# Patient Record
Sex: MALE | Race: WHITE | Employment: OTHER | URBAN - METROPOLITAN AREA
[De-identification: names, ages, dates, MRNs, and addresses within clinical notes are randomized per-mention and may not be internally consistent; named-entity substitution may affect disease eponyms.]

---

## 2019-03-14 ENCOUNTER — OFFICE VISIT (OUTPATIENT)
Dept: FAMILY MEDICINE | Facility: CLINIC | Age: 69
End: 2019-03-14
Payer: MEDICARE

## 2019-03-14 ENCOUNTER — ANCILLARY PROCEDURE (OUTPATIENT)
Dept: GENERAL RADIOLOGY | Facility: CLINIC | Age: 69
End: 2019-03-14
Attending: FAMILY MEDICINE
Payer: MEDICARE

## 2019-03-14 VITALS
OXYGEN SATURATION: 98 % | TEMPERATURE: 99.4 F | HEART RATE: 57 BPM | SYSTOLIC BLOOD PRESSURE: 160 MMHG | DIASTOLIC BLOOD PRESSURE: 78 MMHG | HEIGHT: 64 IN | WEIGHT: 166 LBS | BODY MASS INDEX: 28.34 KG/M2

## 2019-03-14 DIAGNOSIS — R05.9 COUGH: ICD-10-CM

## 2019-03-14 DIAGNOSIS — R22.1 MASS OF LEFT SIDE OF NECK: Primary | ICD-10-CM

## 2019-03-14 DIAGNOSIS — Z87.891 FORMER SMOKER: ICD-10-CM

## 2019-03-14 DIAGNOSIS — I10 ESSENTIAL HYPERTENSION: ICD-10-CM

## 2019-03-14 LAB
BASOPHILS # BLD AUTO: 0 10E9/L (ref 0–0.2)
BASOPHILS NFR BLD AUTO: 0.3 %
DIFFERENTIAL METHOD BLD: ABNORMAL
EOSINOPHIL # BLD AUTO: 0.1 10E9/L (ref 0–0.7)
EOSINOPHIL NFR BLD AUTO: 1.1 %
ERYTHROCYTE [DISTWIDTH] IN BLOOD BY AUTOMATED COUNT: 13.6 % (ref 10–15)
HCT VFR BLD AUTO: 49.9 % (ref 40–53)
HGB BLD-MCNC: 16.7 G/DL (ref 13.3–17.7)
LYMPHOCYTES # BLD AUTO: 3.2 10E9/L (ref 0.8–5.3)
LYMPHOCYTES NFR BLD AUTO: 42.3 %
MCH RBC QN AUTO: 31.6 PG (ref 26.5–33)
MCHC RBC AUTO-ENTMCNC: 33.5 G/DL (ref 31.5–36.5)
MCV RBC AUTO: 94 FL (ref 78–100)
MONOCYTES # BLD AUTO: 0.6 10E9/L (ref 0–1.3)
MONOCYTES NFR BLD AUTO: 7.8 %
NEUTROPHILS # BLD AUTO: 3.7 10E9/L (ref 1.6–8.3)
NEUTROPHILS NFR BLD AUTO: 48.5 %
PLATELET # BLD AUTO: 104 10E9/L (ref 150–450)
RBC # BLD AUTO: 5.29 10E12/L (ref 4.4–5.9)
WBC # BLD AUTO: 7.5 10E9/L (ref 4–11)

## 2019-03-14 PROCEDURE — 85025 COMPLETE CBC W/AUTO DIFF WBC: CPT | Performed by: FAMILY MEDICINE

## 2019-03-14 PROCEDURE — 36415 COLL VENOUS BLD VENIPUNCTURE: CPT | Performed by: FAMILY MEDICINE

## 2019-03-14 PROCEDURE — 99203 OFFICE O/P NEW LOW 30 MIN: CPT | Performed by: FAMILY MEDICINE

## 2019-03-14 PROCEDURE — 71046 X-RAY EXAM CHEST 2 VIEWS: CPT

## 2019-03-14 SDOH — HEALTH STABILITY: MENTAL HEALTH: HOW OFTEN DO YOU HAVE A DRINK CONTAINING ALCOHOL?: MONTHLY OR LESS

## 2019-03-14 ASSESSMENT — MIFFLIN-ST. JEOR: SCORE: 1420.48

## 2019-03-14 NOTE — PROGRESS NOTES
SUBJECTIVE:                                                    Price Rene is a 68 year old male who presents to clinic today for the following health issues:  Patient comes with daughter, information were obtained through an .    Patient does have history of hypertension does take blood pressure medication however he reports he did not take them today.    Patient reports that he started noticing a lump at the left side of his neck about 15 days ago.  He reports is not painful and does not feel warm.  He has no headache no sore throat denies any other lump.  Has no fever no chills no weight loss or changes.  Patient reports he used to be a smoker denies any cough.    Patient reports that about a week ago he was in [place] he had that checked he had a report Ultrasound   He was told he needs a hard lump and he need to have a biopsy.   He provided a hard copy of his result and imaging.    He denies any history or family history of lymphoma he does not have any other lumps anywhere else.  He also reports no blood in his urine or stool denies being lightheaded or dizzy.    Concern - Lump in right side of neck  Onset: 15 days    Description:   Mass like cyst     Intensity: moderate    Progression of Symptoms:  same    Accompanying Signs & Symptoms:  Hurt with neck movement    Previous history of similar problem:   Non    Precipitating factors:   Worsened by: movement    Alleviating factors:  Improved by: None    Therapies Tried and outcome: None      Problem list and histories reviewed & adjusted, as indicated.  Additional history: as documented    Patient Active Problem List   Diagnosis     HTN (hypertension)     History reviewed. No pertinent surgical history.    Social History     Tobacco Use     Smoking status: Former Smoker     Smokeless tobacco: Never Used   Substance Use Topics     Alcohol use: Yes     Alcohol/week: 2.4 oz     Types: 4 Cans of beer per week     Frequency: Monthly or less      "Family History   Problem Relation Age of Onset     Hypertension Mother          No current outpatient medications on file.     Allergies   Allergen Reactions     Penicillins Hives     No lab results found.     ROS:  Constitutional, HEENT, cardiovascular, pulmonary, gi and gu systems are negative, except as otherwise noted.    OBJECTIVE:     /78   Pulse 57   Temp 99.4  F (37.4  C) (Oral)   Ht 1.62 m (5' 3.78\")   Wt 75.3 kg (166 lb)   SpO2 98%   BMI 28.69 kg/m    Body mass index is 28.69 kg/m .  GENERAL: healthy, alert and no distress  HENT: ear canals and TM's normal, nose and mouth without ulcers or lesions  NECK: mass left neck side,  Hard, fixed, non tender and not warm to touch. 3 cm by 2.5 cm   and thyroid normal to palpation  RESP: lungs clear to auscultation - no rales, rhonchi or wheezes  CV: regular rate and rhythm, normal S1 S2, no S3 or S4, no murmur, click or rub, no peripheral edema and peripheral pulses strong  ABDOMEN: soft, nontender, no hepatosplenomegaly, no masses and bowel sounds normal  MS: no gross musculoskeletal defects noted, no edema  NEURO: Normal strength and tone, mentation intact and speech normal    Diagnostic Test Results:  CBC: normal  CXR: normal    ASSESSMENT/PLAN:     Hypertension; slightly worsened   Associated with the following complications:    None   Plan:  No changes in the patient's current treatment plan      ICD-10-CM    1. Mass of left side of neck R22.1 US Biopsy FNA Lymph Node     CBC with platelets and differential   2. Former smoker Z87.891    3. Cough R05 CBC with platelets and differential     XR Chest 2 Views   4. Essential hypertension I10 Basic metabolic panel   1 1 left neck mass, heart rate 6.  Concerning of malignancy.  Patient provided a copy of his sonogram report which was done in [place].  The report was  written in [place].  However he also provided imaging.    Patient will be set up to have a biopsy, fine-needle biopsy.  I did do a CBC which " was normal.    2.  History of hypertension patient reports he is taking medicine from [place] he is not taking his medication today.  I advised him strongly to take his medication daily.  And to bring his prescription on his next office visit.    3.  Former smoker with history of cough and chest x-ray also was normal today.    Patient will follow-up after his fine-needle biopsy.    Patient Instructions   Follow up after Biopsy done      Michelle Menendez MD  [facility]

## 2019-03-14 NOTE — LETTER
[facility]   [address] [place]  [address]  [phone]                                   March 15, 2019    Price Rene  [address]  [facility] [address]        Dear Price,    Have him follow up after he gets his Biopsy ( a few days after ), will be called for an appt.  thanks    Results for orders placed or performed in visit on 03/14/19   CBC with platelets and differential   Result Value Ref Range    WBC 7.5 4.0 - 11.0 10e9/L    RBC Count 5.29 4.4 - 5.9 10e12/L    Hemoglobin 16.7 13.3 - 17.7 g/dL    Hematocrit 49.9 40.0 - 53.0 %    MCV 94 78 - 100 fl    MCH 31.6 26.5 - 33.0 pg    MCHC 33.5 31.5 - 36.5 g/dL    RDW 13.6 10.0 - 15.0 %    Platelet Count 104 (L) 150 - 450 10e9/L    % Neutrophils 48.5 %    % Lymphocytes 42.3 %    % Monocytes 7.8 %    % Eosinophils 1.1 %    % Basophils 0.3 %    Absolute Neutrophil 3.7 1.6 - 8.3 10e9/L    Absolute Lymphocytes 3.2 0.8 - 5.3 10e9/L    Absolute Monocytes 0.6 0.0 - 1.3 10e9/L    Absolute Eosinophils 0.1 0.0 - 0.7 10e9/L    Absolute Basophils 0.0 0.0 - 0.2 10e9/L    Diff Method Automated Method        If you have any questions please call the clinic at [phone]    Sincerely,    Michelle Menendez MD  [facility]

## 2019-03-15 ENCOUNTER — TELEPHONE (OUTPATIENT)
Dept: FAMILY MEDICINE | Facility: CLINIC | Age: 69
End: 2019-03-15

## 2019-03-15 NOTE — TELEPHONE ENCOUNTER
Patient's daughter is calling back regarding scheduling a biopsy. Please call daughter back to discuss.Best call back number is [phone] and okay to leave voicemail.

## 2019-03-15 NOTE — TELEPHONE ENCOUNTER
Message left for patient's daughter to return call to TC line regarding assisting with scheduling the following:    US Biopsy FNA Lymph Node    There are previous image pictures on Team 2 TC Desk. These images need to be sent to whatever imaging facility biopsy is scheduled at. Ask imaging scheduling if images need to be faxed or if they want patient to hand carry to appointment.

## 2019-03-18 NOTE — TELEPHONE ENCOUNTER
[facility] and [facility] stated they needed levels before they can schedule procedure. Please place these in the orders and inform TC.

## 2019-03-19 NOTE — TELEPHONE ENCOUNTER
Please inform pt. Of family of normal CXR result  Have him follow up after he gets his Biopsy ( a few days after ), will be called for an appt.  thanks

## 2019-03-20 ENCOUNTER — ANCILLARY PROCEDURE (OUTPATIENT)
Dept: ULTRASOUND IMAGING | Facility: CLINIC | Age: 69
End: 2019-03-20
Attending: FAMILY MEDICINE
Payer: MEDICARE

## 2019-03-20 DIAGNOSIS — R22.1 MASS OF LEFT SIDE OF NECK: Primary | ICD-10-CM

## 2019-03-20 PROCEDURE — 88305 TISSUE EXAM BY PATHOLOGIST: CPT | Performed by: FAMILY MEDICINE

## 2019-03-20 PROCEDURE — 88173 CYTOPATH EVAL FNA REPORT: CPT | Performed by: FAMILY MEDICINE

## 2019-03-20 PROCEDURE — 88172 CYTP DX EVAL FNA 1ST EA SITE: CPT | Performed by: FAMILY MEDICINE

## 2019-03-20 PROCEDURE — 00000155 ZZHCL STATISTIC H-CELL BLOCK W/STAIN: Performed by: FAMILY MEDICINE

## 2019-03-20 RX ORDER — LIDOCAINE HYDROCHLORIDE 10 MG/ML
5 INJECTION, SOLUTION INFILTRATION; PERINEURAL ONCE
Status: COMPLETED | OUTPATIENT
Start: 2019-03-20 | End: 2019-03-20

## 2019-03-20 RX ADMIN — LIDOCAINE HYDROCHLORIDE 3 ML: 10 INJECTION, SOLUTION INFILTRATION; PERINEURAL at 12:13

## 2019-03-22 LAB — COPATH REPORT: NORMAL

## 2019-03-25 ENCOUNTER — OFFICE VISIT (OUTPATIENT)
Dept: FAMILY MEDICINE | Facility: CLINIC | Age: 69
End: 2019-03-25
Payer: MEDICARE

## 2019-03-25 VITALS
HEART RATE: 65 BPM | WEIGHT: 170 LBS | OXYGEN SATURATION: 98 % | BODY MASS INDEX: 29.38 KG/M2 | SYSTOLIC BLOOD PRESSURE: 187 MMHG | DIASTOLIC BLOOD PRESSURE: 78 MMHG | TEMPERATURE: 99.5 F

## 2019-03-25 DIAGNOSIS — R22.1 MASS OF LEFT SIDE OF NECK: ICD-10-CM

## 2019-03-25 DIAGNOSIS — C73 MALIGNANT NEOPLASM OF THYROID GLAND (H): Primary | ICD-10-CM

## 2019-03-25 PROCEDURE — 99213 OFFICE O/P EST LOW 20 MIN: CPT | Performed by: FAMILY MEDICINE

## 2019-03-25 ASSESSMENT — PAIN SCALES - GENERAL: PAINLEVEL: NO PAIN (0)

## 2019-03-25 NOTE — PROGRESS NOTES
SUBJECTIVE:                                                    Price Rene is a 68 year old male who presents to clinic today for the following health issues:  Patient is here for results from last visit.  Patient comes in today to discuss his results.  Patient initially, came to the clinic on March 14, 2019 was He was complaining about a hard lump present in his left neck.  He reports it was present for about 2 weeks or so.  Patient had an  sonogram was done in [place].  He was scheduled was arranged to have a fine-needle biopsy.  He comes in today to discuss his results.  His results showed positive for malignancy, papillary thyroid carcinoma.    Patient reports the lump remained the same size is not painful, he has no difficulty swallowing or eating denies any coughing or choking.  Has no weight gain or loss.  He has no fever no chills.  He denies any family history of cancer or lung cancer.  He a former smoker however he does report drinking alcohol once in a while.    Patient does have history of hypertension he reports that he did not take his medication  today he takes medication he brought with him from [place].  He denies headache denies chest pain or shortness of breath    Patient Active Problem List   Diagnosis     HTN (hypertension)     Mass of left side of neck     Malignant neoplasm of thyroid gland (H)     History reviewed. No pertinent surgical history.    Social History     Tobacco Use     Smoking status: Former Smoker     Smokeless tobacco: Never Used   Substance Use Topics     Alcohol use: Yes     Alcohol/week: 2.4 oz     Types: 4 Cans of beer per week     Frequency: Monthly or less     Family History   Problem Relation Age of Onset     Hypertension Mother          No current outpatient medications on file.       ROS:  Constitutional, HEENT, cardiovascular, pulmonary, gi and gu systems are negative, except as otherwise noted.    OBJECTIVE:     /78 (BP Location: Right arm, Patient  Position: Chair, Cuff Size: Adult Regular)   Pulse 65   Temp 99.5  F (37.5  C) (Oral)   Wt 77.1 kg (170 lb)   SpO2 98%   BMI 29.38 kg/m     Body mass index is 29.38 kg/m .  GENERAL: healthy, alert and no distress  MS: no gross musculoskeletal defects noted, no edema    Diagnostic Test Results:  none     ASSESSMENT/PLAN:       ICD-10-CM    1. Malignant neoplasm of thyroid gland (H) C73 GENERAL SURG ADULT REFERRAL     ONC/HEME ADULT REFERRAL   2. Mass of left side of neck R22.1 GENERAL SURG ADULT REFERRAL     ONC/HEME ADULT REFERRAL   I reviewed the fine needle biopsy results with him and his daughter.  I did discuss with him the results showing that he has positive for malignancy positive for papillary thyroid carcinoma.    Patient was referred to general surgeon for surgical consultation and possible excision.  In addition, he was referred to oncology for discussion of possible treatment option.    In addition, I did discuss with the patient his daughter he may need to be referred to Endocrinology after he had his thyroid surgery done.    History of hypertension he was advised strongly to take his blood pressure medication daily, avoid skipping any doses.  In addition I did discuss with him again to bring his medication in his next visit.      There are no Patient Instructions on file for this visit.    Michelle Menendez MD  [facility]

## 2019-03-26 ENCOUNTER — TELEPHONE (OUTPATIENT)
Dept: ONCOLOGY | Facility: CLINIC | Age: 69
End: 2019-03-26

## 2019-03-26 NOTE — TELEPHONE ENCOUNTER
ONCOLOGY INTAKE: Records Information      APPT INFORMATION: 04/04  3:45 w/Kevin  Referring provider:  Michelle Menendez MD  Referring provider s clinic:  [facility]  Reason for visit/diagnosis:  Malignant neoplasm of thyroid gland (H)  Mass of left side of neck    Were the records received with the referral (via [employer])? Complete Per In-Law    Has patient been seen for any external appt for this diagnosis (enter clinic/location)? No    ADDITIONAL INFORMATION:  Dx:Malignant neoplasm of thyroid gland (H)  Mass of left side of neck: Caller intake: Ref by:Michelle Menendez MD-[facility]: Records In Epic

## 2019-03-28 ENCOUNTER — TELEPHONE (OUTPATIENT)
Dept: FAMILY MEDICINE | Facility: CLINIC | Age: 69
End: 2019-03-28

## 2019-03-28 NOTE — TELEPHONE ENCOUNTER
Fran returned call, says his dad is scheduled to see oncology in  on 4/4 and he also scheduled with [facility] for 4/11 and might cancel that if he is confident about the provider they see on 4/4, otherwise will plan to get second opinion.    Fran says he has some questions about the diagnosis and has some forms he needs to fill out and has questions about them.   He wants to bring his dad in to see Dr. Menendez for assistance.  Scheduled for Monday afternoon.    April Hermosillo RN  [facility]

## 2019-03-28 NOTE — TELEPHONE ENCOUNTER
Called patient at [phone]. Left message to return call to nurse triage line at [phone].    Ashleigh Ambriz RN

## 2019-03-28 NOTE — TELEPHONE ENCOUNTER
Reason for Call:  Other / Diagnose questions    Detailed comments: Fran, patient's son, called and stated patient was just diagnosed with thyroid cancer. Fran also stated that they received a letter which contains some terms they do not fully comprehend.  Fran would like to know if it would be convenient for patient to come and see his PCP so they could discuss what this letter might be about and maybe understand how advanced the cancer is.  Please call patient's son back to discuss.    Phone Number Patient can be reached at: [phone] (Fran, patient's son)    Best Time: Anytime    Can we leave a detailed message on this number? YES    Call taken on 3/28/2019 at 12:20 PM by Evelin Leyva

## 2019-04-01 ENCOUNTER — OFFICE VISIT (OUTPATIENT)
Dept: FAMILY MEDICINE | Facility: CLINIC | Age: 69
End: 2019-04-01
Payer: MEDICARE

## 2019-04-01 VITALS
DIASTOLIC BLOOD PRESSURE: 80 MMHG | SYSTOLIC BLOOD PRESSURE: 140 MMHG | TEMPERATURE: 98.9 F | WEIGHT: 169 LBS | HEART RATE: 63 BPM | BODY MASS INDEX: 29.21 KG/M2

## 2019-04-01 DIAGNOSIS — K21.9 GASTROESOPHAGEAL REFLUX DISEASE WITHOUT ESOPHAGITIS: ICD-10-CM

## 2019-04-01 DIAGNOSIS — C73 MALIGNANT NEOPLASM OF THYROID GLAND (H): ICD-10-CM

## 2019-04-01 DIAGNOSIS — I10 ESSENTIAL HYPERTENSION: Primary | ICD-10-CM

## 2019-04-01 PROCEDURE — 99213 OFFICE O/P EST LOW 20 MIN: CPT | Performed by: FAMILY MEDICINE

## 2019-04-01 RX ORDER — ACETAMINOPHEN 325 MG/1
325-650 TABLET ORAL EVERY 6 HOURS PRN
COMMUNITY

## 2019-04-01 RX ORDER — TELMISARTAN 40 MG/1
40 TABLET ORAL DAILY
Qty: 90 TABLET | Refills: 3 | Status: SHIPPED | OUTPATIENT
Start: 2019-04-01 | End: 2020-01-23

## 2019-04-01 ASSESSMENT — PAIN SCALES - GENERAL: PAINLEVEL: NO PAIN (0)

## 2019-04-01 NOTE — PROGRESS NOTES
SUBJECTIVE:                                                    Price Rene is a 68 year old male who presents to clinic today for the following health issues:    Patient comes with his daughter tonight.  He does have a diagnosis of neoplasm of the thyroid disease.  He did have a fine-needle March 20, 2019 which indicated , his pathology report indicated that he had malignancy of papillary thyroid carcinoma.  He is scheduled to see oncology on April 4, 2019.  History of hypertension, acid reflux.  He does need medications his medication      Problem list and histories reviewed & adjusted, as indicated.  Additional history: as documented    Patient Active Problem List   Diagnosis     HTN (hypertension)     Mass of left side of neck     Malignant neoplasm of thyroid gland (H)     History reviewed. No pertinent surgical history.    Social History     Tobacco Use     Smoking status: Former Smoker     Smokeless tobacco: Never Used   Substance Use Topics     Alcohol use: Yes     Alcohol/week: 2.4 oz     Types: 4 Cans of beer per week     Frequency: Monthly or less     Family History   Problem Relation Age of Onset     Hypertension Mother          Current Outpatient Medications   Medication Sig Dispense Refill     acetaminophen (TYLENOL) 325 MG tablet Take 325-650 mg by mouth every 6 hours as needed for mild pain       omeprazole (PRILOSEC) 20 MG DR capsule Take 1 capsule (20 mg) by mouth daily 90 capsule 3     OMEPRAZOLE PO Take 20 mg by mouth       telmisartan (MICARDIS) 40 MG tablet Take 1 tablet (40 mg) by mouth daily 90 tablet 3     UNABLE TO FIND 20 mg MEDICATION NAME: natrazim       Allergies   Allergen Reactions     Penicillins Hives       ROS:  Constitutional, HEENT, cardiovascular, pulmonary, gi and gu systems are negative, except as otherwise noted.    OBJECTIVE:     /80   Pulse 63   Temp 98.9  F (37.2  C) (Oral)   Wt 76.7 kg (169 lb)   BMI 29.21 kg/m     Body mass index is 29.21  kg/m .  GENERAL: healthy, alert and no distress    Diagnostic Test Results:  none     ASSESSMENT/PLAN:       ICD-10-CM    1. Essential hypertension I10 telmisartan (MICARDIS) 40 MG tablet     omeprazole (PRILOSEC) 20 MG DR capsule   2. Gastroesophageal reflux disease without esophagitis K21.9 telmisartan (MICARDIS) 40 MG tablet     omeprazole (PRILOSEC) 20 MG DR capsule   3. Malignant neoplasm of thyroid gland (H) C73      I did renew his medication.    He will follow-up with oncology on April 4, 2019.  Patient was advised to remain active.  Was advised to follow-up in 6 months for complete annual exam or sooner  There are no Patient Instructions on file for this visit.    Michelle Menendez MD  [facility]

## 2019-04-03 NOTE — TELEPHONE ENCOUNTER
RECORDS STATUS - ALL OTHER DIAGNOSIS      RECORDS RECEIVED FROM: [facility]   DATE RECEIVED: 4/3/19   NOTES STATUS DETAILS   OFFICE NOTE from referring provider  Epic   OFFICE NOTE from medical oncologist NA    DISCHARGE SUMMARY from hospital NA    DISCHARGE REPORT from the ER NA    OPERATIVE REPORT NA    MEDICATION LIST  [facility]   CLINICAL TRIAL TREATMENTS TO DATE     LABS     PATHOLOGY REPORTS 3/20/19 Epic/Complete   ANYTHING RELATED TO DIAGNOSIS  Epic   GENONOMIC TESTING     TYPE:     IMAGING (NEED IMAGES & REPORT)     CT SCANS 3/20/19 PACS   MRI NA    MAMMO NA    ULTRASOUND NA    PET NA    X Ray        PACS

## 2019-04-04 ENCOUNTER — ONCOLOGY VISIT (OUTPATIENT)
Dept: ONCOLOGY | Facility: CLINIC | Age: 69
End: 2019-04-04
Attending: FAMILY MEDICINE
Payer: MEDICARE

## 2019-04-04 ENCOUNTER — PRE VISIT (OUTPATIENT)
Dept: ONCOLOGY | Facility: CLINIC | Age: 69
End: 2019-04-04

## 2019-04-04 ENCOUNTER — TELEPHONE (OUTPATIENT)
Dept: ENDOCRINOLOGY | Facility: CLINIC | Age: 69
End: 2019-04-04

## 2019-04-04 VITALS
HEIGHT: 63 IN | BODY MASS INDEX: 29.94 KG/M2 | TEMPERATURE: 98.9 F | HEART RATE: 63 BPM | OXYGEN SATURATION: 96 % | RESPIRATION RATE: 16 BRPM | DIASTOLIC BLOOD PRESSURE: 76 MMHG | SYSTOLIC BLOOD PRESSURE: 175 MMHG

## 2019-04-04 DIAGNOSIS — K21.9 GASTROESOPHAGEAL REFLUX DISEASE, ESOPHAGITIS PRESENCE NOT SPECIFIED: ICD-10-CM

## 2019-04-04 DIAGNOSIS — I10 ESSENTIAL HYPERTENSION: Primary | ICD-10-CM

## 2019-04-04 DIAGNOSIS — Z87.891 SMOKING HISTORY: ICD-10-CM

## 2019-04-04 DIAGNOSIS — R22.1 MASS OF LEFT SIDE OF NECK: ICD-10-CM

## 2019-04-04 DIAGNOSIS — R89.6 ABNORMAL CYTOLOGY: Primary | ICD-10-CM

## 2019-04-04 DIAGNOSIS — C73 MALIGNANT NEOPLASM OF THYROID GLAND (H): ICD-10-CM

## 2019-04-04 PROCEDURE — G0463 HOSPITAL OUTPT CLINIC VISIT: HCPCS | Mod: ZF

## 2019-04-04 PROCEDURE — 99214 OFFICE O/P EST MOD 30 MIN: CPT | Mod: GC | Performed by: INTERNAL MEDICINE

## 2019-04-04 ASSESSMENT — PAIN SCALES - GENERAL: PAINLEVEL: MILD PAIN (3)

## 2019-04-04 NOTE — TELEPHONE ENCOUNTER
To schedulers : please schedule with me on Wed 4/10/19 at 5 PM.  Tell him we are overbooking ( I note there is currently an inappropriate return in a new patient space at 5 PM ).  See if he can get the thyroid and neck ultrasound before he is seen by me.       Schedule him with Dr Kisha Loja as well (reason cytology positive thyroid cancer)--    Yanira Uribe MD  Endocrine triage        To Dr Brown:  Please order the following labs if convenient (otherwise, we will get them when he sees us) : TSH reflex, thyroglobulin, calcium, creatinine    I am placing the order for the ultrasound so they understand what we want.    Yanira Uribe

## 2019-04-04 NOTE — TELEPHONE ENCOUNTER
----- Message from Rocael Brown MD sent at 4/4/2019  4:00 PM CDT -----  Regarding: new thyroid cancer  I am seeing this huong w new dx thyroid ca    When would you like to see him    Should we get any tests now?    Thanks    Rocael

## 2019-04-04 NOTE — NURSING NOTE
"Oncology Rooming Note    April 4, 2019 3:49 PM   Price Rene is a 68 year old male who presents for:    Chief Complaint   Patient presents with     Oncology Clinic Visit     Neoplasm of Thyroid Gland     Initial Vitals: /76   Pulse 63   Temp 98.9  F (37.2  C) (Oral)   Resp 16   Ht 1.6 m (5' 3\")   SpO2 96%   BMI 29.94 kg/m   Estimated body mass index is 29.94 kg/m  as calculated from the following:    Height as of this encounter: 1.6 m (5' 3\").    Weight as of 4/1/19: 76.7 kg (169 lb). Body surface area is 1.85 meters squared.  Mild Pain (3) Comment: Arm and Back   No LMP for male patient.  Allergies reviewed: Yes  Medications reviewed: Yes    Medications: Medication refills not needed today.  Pharmacy name entered into [employer]: [id number] - [place], [place] - [address] AT [facility]    Clinical concerns: No New Concerns    AGNIESZKA Hernández  "

## 2019-04-04 NOTE — PROGRESS NOTES
[facility] Hematology-Oncology New Clinic Visit          Price Rene MRN# [id number]   Age: 68 year old YOB: 1950   DOS: 4/4/2019      HPI   Price Rene is a 68 year old yo male with PMH of HTN, who presents with a newly diagnosed papillary thyroid carcinoma.     Patient is accompanied with two of his daughter, they all speak Marshallese, and there is an  help to translate during this visit.   Patient stated he was in his usual health till about 1 month ago, when he noticed a mass in the left side of his neck when he turned his head around, there was mild local pain, with some pain in the upper back and left arm. He denies difficulty in swallowing. No hoarseness of voice. He is eating well and weight has been stable.   He denies any history of radiation.   He is active and able to walk several flight of stairs without get shortness of breath.     He was seen by her PCP who requested a US guided biopsy which was done on 3/20, the pathology report was papillary thyroid carcinoma, and he was referred to oncology for further evaluation.     Pt does not report fevers, chills, dysphagia or dysarthria, chest pain, palpitations, SOB, constipation, diarrhea, black stool.       ROS: A complete ROS was otherwise negative    PERTINENT PAST MEDICAL HISTORY  Past Medical History:   Diagnosis Date     HTN (hypertension)      Malignant neoplasm of thyroid gland (H) 3/25/2019     Denies past surgical history.    SOCIAL / FAMILY HISTORY  Social History     Socioeconomic History     Marital status:      Spouse name: None     Number of children: None     Years of education: None     Highest education level: None   Occupational History     None   Social Needs     Financial resource strain: None     Food insecurity:     Worry: None     Inability: None     Transportation needs:     Medical: None     Non-medical: None   Tobacco Use     Smoking status: Former Smoker     Smokeless  "tobacco: Never Used   Substance and Sexual Activity     Alcohol use: Yes     Alcohol/week: 2.4 oz     Types: 4 Cans of beer per week     Frequency: Monthly or less     Drug use: No     Sexual activity: Not Currently   Lifestyle     Physical activity:     Days per week: None     Minutes per session: None     Stress: None   Relationships     Social connections:     Talks on phone: None     Gets together: None     Attends Christianity service: None     Active member of club or organization: None     Attends meetings of clubs or organizations: None     Relationship status: None     Intimate partner violence:     Fear of current or ex partner: None     Emotionally abused: None     Physically abused: None     Forced sexual activity: None   Other Topics Concern     None   Social History Narrative     None       Family History   Problem Relation Age of Onset     Hypertension Mother        MEDICATIONS  Current Outpatient Rx   Medication Sig Dispense Refill     acetaminophen (TYLENOL) 325 MG tablet Take 325-650 mg by mouth every 6 hours as needed for mild pain       omeprazole (PRILOSEC) 20 MG DR capsule Take 1 capsule (20 mg) by mouth daily 90 capsule 3     OMEPRAZOLE PO Take 20 mg by mouth       telmisartan (MICARDIS) 40 MG tablet Take 1 tablet (40 mg) by mouth daily 90 tablet 3     UNABLE TO FIND 20 mg MEDICATION NAME: natrazim         ALLERGIES  Allergies   Allergen Reactions     Penicillins Hives       PHYSICAL EXAMINATION:  /76   Pulse 63   Temp 98.9  F (37.2  C) (Oral)   Resp 16   Ht 1.6 m (5' 3\")   SpO2 96%   BMI 29.94 kg/m    Constitutional: Awake and alert, appears well-developed, not in acute distress.  Eyes: No scleral icterus. Eyes exhibit no discharge.  ENT/Mouth: Oral mucosa pink and moist. A movable mass of around 4cm in diameter, which is hard, around and painless, margin is clear. Thyroid is not enlarged, there is no thyroid nodules. No other palpable lymph nodes in the neck.   Cardiovascular: " Normal rate, regular rhythm, S1, S2. No murmur or rub. No LE edema.  Respiratory: No respiratory distress. Clear to auscultation bilaterally. No wheezes.  Gastrointestinal: Soft. No distension. No tenderness or garding.  Neurological: AAOX3, grossly non-focal  Psychiatric: Mentation and affect appear normal.  Skin: Skin is warm, not diaphoretic.  Hematologic/Lymphatic/Immunologic: No overt bleeding. No lymphadenopathy in inguinal and axillary area.     DATA:  No results for input(s): NA, POTASSIUM, CHLORIDE, CO2, ANIONGAP, BUN, CR, GLC, AWA, MAG, PHOS in the last 22668 hours.  Recent Labs   Lab Test 03/14/19  1653   WBC 7.5   HGB 16.7   *   MCV 94   NEUTROPHIL 48.5     No results for input(s): BILITOTAL, ALKPHOS, ALT, AST, ALBUMIN, LDH in the last 69930 hours.  No results found for: TSH]    Results for orders placed or performed in visit on 03/20/19   US Biopsy FNA Lymph Node    Narrative    EXAMINATION: Ultrasound-guided fine-needle aspiration, 3/20/2019 12:19  PM    COMPARISON: None.    HISTORY: Mass of left side of the neck.    FINDINGS: After describing the risks, benefits, and alternatives to  ultrasound guided left neck cystic and solid mass fine needle  aspiration, the patient elected to proceed and written and an informed  consent was obtained.    The patient was then placed supine and preliminary grayscale images  demonstrated  a predominantly anechoic cystic mass with a solid  papillary mural nodule. The patient was then prepped and draped in a  sterile fashion. Local anesthesia was achieved using 1 cc of 1%  lidocaine. Under direct sonographic guidance, 4 passes of the nodule  were performed using 25-gauge needles. Preliminary interpretation from  pathology suggests adequate cellularity for diagnosis. Following the  biopsy, a fluid fluid level was seen within the cystic component,  likely a small amount of bleeding. This was noted to be stable over a  period of 5 minutes, with no evidence of active  bleeding by color  Doppler ultrasound. There were no immediate complications.      Impression    IMPRESSION: Uncomplicated ultrasound-guided left neck cystic and solid  mass fine-needle aspiration.    I, GRISELDA SCHWAB MD, attest that I was present for all critical  portions of the procedure and was immediately available to provide  guidance and assistance during the remainder of the procedure.    I have personally reviewed the examination and initial interpretation  and I agree with the findings.    GRISELDA SCHWAB MD       IMPRESSION:  68 y/m male with hx of HTN, recently identified a mass in left side of neck, with US guided biopsy, pathology proved papillary thyroid carcinoma. He was referred to oncology for further management.   As there is no evidence of systemic disease, and the staging workup has not been initiated, Endocrinology usually manages the early to locally advanced stage differentiated thyroid cancers.       RECS:  - refer to endocrinology and thyroid surgery for further management of his papillary thyroid cancer.     Staffed w/ Dr. Kevin Ortiz MD/MPH  Hem/Onc Fellow    I independently examined this patient and my assessment is in agreement with the above note.  I reviewed all tests and past medical history and my evaluation agrees with the findings in the note.  The neck nodule is not changed much in the last month and he has no limitations of exercise or other issues that are not controlled.  We will refer to Dr. Uribe and also to surgery for removal.  We briefly discussed the general approach to thyroid cancer but will let the let Dr. Uribe finalize the plan.    Rocael Brown M.D.  Professor  Hematology, Oncology and Transplantation

## 2019-04-04 NOTE — LETTER
4/4/2019       RE: Price Rene  [address]  [place] [address]     Dear Colleague,    Thank you for referring your patient, Price Rene, to the [facility] CANCER [facility]. Please see a copy of my visit note below.       [facility] Hematology-Oncology New Clinic Visit          Price Rene MRN# [id number]   Age: 68 year old YOB: 1950   DOS: 4/4/2019      HPI   Price Rene is a 68 year old yo male with PMH of HTN, who presents with a newly diagnosed papillary thyroid carcinoma.     Patient is accompanied with two of his daughter, they all speak [place]an, and there is an  help to translate during this visit.   Patient stated he was in his usual health till about 1 month ago, when he noticed a mass in the left side of his neck when he turned his head around, there was mild local pain, with some pain in the upper back and left arm. He denies difficulty in swallowing. No hoarseness of voice. He is eating well and weight has been stable.   He denies any history of radiation.   He is active and able to walk several flight of stairs without get shortness of breath.     He was seen by her PCP who requested a US guided biopsy which was done on 3/20, the pathology report was papillary thyroid carcinoma, and he was referred to oncology for further evaluation.     Pt does not report fevers, chills, dysphagia or dysarthria, chest pain, palpitations, SOB, constipation, diarrhea, black stool.       ROS: A complete ROS was otherwise negative    PERTINENT PAST MEDICAL HISTORY  Past Medical History:   Diagnosis Date     HTN (hypertension)      Malignant neoplasm of thyroid gland (H) 3/25/2019     Denies past surgical history.    SOCIAL / FAMILY HISTORY  Social History     Socioeconomic History     Marital status:      Spouse name: None     Number of children: None     Years of education: None     Highest education level: None  "  Occupational History     None   Social Needs     Financial resource strain: None     Food insecurity:     Worry: None     Inability: None     Transportation needs:     Medical: None     Non-medical: None   Tobacco Use     Smoking status: Former Smoker     Smokeless tobacco: Never Used   Substance and Sexual Activity     Alcohol use: Yes     Alcohol/week: 2.4 oz     Types: 4 Cans of beer per week     Frequency: Monthly or less     Drug use: No     Sexual activity: Not Currently   Lifestyle     Physical activity:     Days per week: None     Minutes per session: None     Stress: None   Relationships     Social connections:     Talks on phone: None     Gets together: None     Attends Judaism service: None     Active member of club or organization: None     Attends meetings of clubs or organizations: None     Relationship status: None     Intimate partner violence:     Fear of current or ex partner: None     Emotionally abused: None     Physically abused: None     Forced sexual activity: None   Other Topics Concern     None   Social History Narrative     None       Family History   Problem Relation Age of Onset     Hypertension Mother        MEDICATIONS  Current Outpatient Rx   Medication Sig Dispense Refill     acetaminophen (TYLENOL) 325 MG tablet Take 325-650 mg by mouth every 6 hours as needed for mild pain       omeprazole (PRILOSEC) 20 MG DR capsule Take 1 capsule (20 mg) by mouth daily 90 capsule 3     OMEPRAZOLE PO Take 20 mg by mouth       telmisartan (MICARDIS) 40 MG tablet Take 1 tablet (40 mg) by mouth daily 90 tablet 3     UNABLE TO FIND 20 mg MEDICATION NAME: natrazim         ALLERGIES  Allergies   Allergen Reactions     Penicillins Hives       PHYSICAL EXAMINATION:  /76   Pulse 63   Temp 98.9  F (37.2  C) (Oral)   Resp 16   Ht 1.6 m (5' 3\")   SpO2 96%   BMI 29.94 kg/m     Constitutional: Awake and alert, appears well-developed, not in acute distress.  Eyes: No scleral icterus. Eyes exhibit " no discharge.  ENT/Mouth: Oral mucosa pink and moist. A movable mass of around 4cm in diameter, which is hard, around and painless, margin is clear. Thyroid is not enlarged, there is no thyroid nodules. No other palpable lymph nodes in the neck.   Cardiovascular: Normal rate, regular rhythm, S1, S2. No murmur or rub. No LE edema.  Respiratory: No respiratory distress. Clear to auscultation bilaterally. No wheezes.  Gastrointestinal: Soft. No distension. No tenderness or garding.  Neurological: AAOX3, grossly non-focal  Psychiatric: Mentation and affect appear normal.  Skin: Skin is warm, not diaphoretic.  Hematologic/Lymphatic/Immunologic: No overt bleeding. No lymphadenopathy in inguinal and axillary area.     DATA:  No results for input(s): NA, POTASSIUM, CHLORIDE, CO2, ANIONGAP, BUN, CR, GLC, AWA, MAG, PHOS in the last 70539 hours.  Recent Labs   Lab Test 03/14/19  1653   WBC 7.5   HGB 16.7   *   MCV 94   NEUTROPHIL 48.5     No results for input(s): BILITOTAL, ALKPHOS, ALT, AST, ALBUMIN, LDH in the last 93752 hours.  No results found for: TSH]    Results for orders placed or performed in visit on 03/20/19   US Biopsy FNA Lymph Node    Narrative    EXAMINATION: Ultrasound-guided fine-needle aspiration, 3/20/2019 12:19  PM    COMPARISON: None.    HISTORY: Mass of left side of the neck.    FINDINGS: After describing the risks, benefits, and alternatives to  ultrasound guided left neck cystic and solid mass fine needle  aspiration, the patient elected to proceed and written and an informed  consent was obtained.    The patient was then placed supine and preliminary grayscale images  demonstrated  a predominantly anechoic cystic mass with a solid  papillary mural nodule. The patient was then prepped and draped in a  sterile fashion. Local anesthesia was achieved using 1 cc of 1%  lidocaine. Under direct sonographic guidance, 4 passes of the nodule  were performed using 25-gauge needles. Preliminary  interpretation from  pathology suggests adequate cellularity for diagnosis. Following the  biopsy, a fluid fluid level was seen within the cystic component,  likely a small amount of bleeding. This was noted to be stable over a  period of 5 minutes, with no evidence of active bleeding by color  Doppler ultrasound. There were no immediate complications.      Impression    IMPRESSION: Uncomplicated ultrasound-guided left neck cystic and solid  mass fine-needle aspiration.    I, GRISELDA SCHWAB MD, attest that I was present for all critical  portions of the procedure and was immediately available to provide  guidance and assistance during the remainder of the procedure.    I have personally reviewed the examination and initial interpretation  and I agree with the findings.    GRISELDA SCHWAB MD       IMPRESSION:  68 y/m male with hx of HTN, recently identified a mass in left side of neck, with US guided biopsy, pathology proved papillary thyroid carcinoma. He was referred to oncology for further management.   As there is no evidence of systemic disease, and the staging workup has not been initiated, Endocrinology usually manages the early to locally advanced stage differentiated thyroid cancers.       RECS:  - refer to endocrinology and thyroid surgery for further management of his papillary thyroid cancer.     Staffed w/ Dr. Kevin Ortiz MD/MPH  Hem/Onc Fellow    I independently examined this patient and my assessment is in agreement with the above note.  I reviewed all tests and past medical history and my evaluation agrees with the findings in the note.  The neck nodule is not changed much in the last month and he has no limitations of exercise or other issues that are not controlled.  We will refer to Dr. Uribe and also to surgery for removal.  We briefly discussed the general approach to thyroid cancer but will let the let Dr. Uribe finalize the plan.    Rocael Brown,  M.D.  Professor  Hematology, Oncology and Transplantation

## 2019-04-05 DIAGNOSIS — C73 MALIGNANT NEOPLASM OF THYROID GLAND (H): Primary | ICD-10-CM

## 2019-04-05 DIAGNOSIS — R22.1 MASS OF LEFT SIDE OF NECK: ICD-10-CM

## 2019-04-05 NOTE — TELEPHONE ENCOUNTER
Date of appointment:4/8/19    Diagnosis/reason for appointment:Cons-DX Malignant neoplasm of thyroid gland (H) [C73]; Mass of left side of neck   Referring provider/facility:Ref Kevin Lopez  Who called:Pool message    Recent Studies  Imaging:  Pathology:  Labs:  Previous chemo/radiation (if known):    Records in Epic    Additional information:ok to schedule per Calli

## 2019-04-08 ENCOUNTER — PRE VISIT (OUTPATIENT)
Dept: OTOLARYNGOLOGY | Facility: CLINIC | Age: 69
End: 2019-04-08

## 2019-04-08 ENCOUNTER — OFFICE VISIT (OUTPATIENT)
Dept: OTOLARYNGOLOGY | Facility: CLINIC | Age: 69
End: 2019-04-08
Payer: MEDICARE

## 2019-04-08 ENCOUNTER — DOCUMENTATION ONLY (OUTPATIENT)
Dept: CARE COORDINATION | Facility: CLINIC | Age: 69
End: 2019-04-08

## 2019-04-08 ENCOUNTER — APPOINTMENT (OUTPATIENT)
Dept: LAB | Facility: CLINIC | Age: 69
End: 2019-04-08
Payer: MEDICARE

## 2019-04-08 ENCOUNTER — TELEPHONE (OUTPATIENT)
Dept: OTOLARYNGOLOGY | Facility: CLINIC | Age: 69
End: 2019-04-08

## 2019-04-08 VITALS — WEIGHT: 165 LBS | BODY MASS INDEX: 29.23 KG/M2 | HEIGHT: 63 IN

## 2019-04-08 DIAGNOSIS — C73 PAPILLARY THYROID CARCINOMA (H): Primary | ICD-10-CM

## 2019-04-08 ASSESSMENT — PATIENT HEALTH QUESTIONNAIRE - PHQ9
SUM OF ALL RESPONSES TO PHQ QUESTIONS 1-9: 10
SUM OF ALL RESPONSES TO PHQ QUESTIONS 1-9: 10

## 2019-04-08 ASSESSMENT — PAIN SCALES - GENERAL: PAINLEVEL: MILD PAIN (2)

## 2019-04-08 ASSESSMENT — MIFFLIN-ST. JEOR: SCORE: 1403.57

## 2019-04-08 NOTE — PATIENT INSTRUCTIONS
1. Please complete lab work today.   2. Please schedule CT and neck ultrasound.   3. Please arrange a Pre-op physical with your PCP  4. You are scheduled for surgery on 4/25 with Dr. Rodriguez.   5. Please review contents of surgical packet given to you today.   6. Please call the ENT clinic with any questions,concerns, new or worsening symptoms.    -Clinic number is [phone]   - Calli's direct line (Dr. Cazares's nurse) [phone]

## 2019-04-08 NOTE — LETTER
4/8/2019     RE: Price Rene  [address]  [place] [address]     Dear Colleague,    Thank you for referring your patient, Price Rene, to the [facility] EAR NOSE AND THROAT at [facility]. Please see a copy of my visit note below.    HISTORY OF PRESENT ILLNESS:  Mr. Rene is a 68-year-old man who recently noticed a left neck mass.  This has been biopsied and found to contain metastatic papillary thyroid cancer.  He therefore has been referred for surgical management.  The patient denies any history of radiation or endocrine type tumors in his family.  He has not noticed any hoarseness or dysphagia.  He has not noticed any lumps anywhere else in his neck.         Past Medical History:   Diagnosis Date     HTN (hypertension)      Malignant neoplasm of thyroid gland (H) 3/25/2019     No past surgical history on file.    Current Outpatient Medications:      acetaminophen (TYLENOL) 325 MG tablet, Take 325-650 mg by mouth every 6 hours as needed for mild pain, Disp: , Rfl:      omeprazole (PRILOSEC) 20 MG DR capsule, Take 1 capsule (20 mg) by mouth daily, Disp: 90 capsule, Rfl: 3     OMEPRAZOLE PO, Take 20 mg by mouth, Disp: , Rfl:      telmisartan (MICARDIS) 40 MG tablet, Take 1 tablet (40 mg) by mouth daily, Disp: 90 tablet, Rfl: 3     UNABLE TO FIND, 20 mg MEDICATION NAME: natrazim, Disp: , Rfl:   Allergies   Allergen Reactions     Penicillins Hives     Social History     Tobacco Use     Smoking status: Former Smoker     Smokeless tobacco: Never Used   Substance Use Topics     Alcohol use: Yes     Alcohol/week: 2.4 oz     Types: 4 Cans of beer per week     Frequency: Monthly or less     Review Of Systems  Skin: negative  Eyes: negative  Ears/Nose/Throat: negative  Respiratory: No shortness of breath, dyspnea on exertion, cough, or hemoptysis  Cardiovascular: negative  Gastrointestinal: negative  Genitourinary: negative  Musculoskeletal:  negative  Neurologic: negative  Psychiatric: negative  Hematologic/Lymphatic/Immunologic: negative  Endocrine: negative  PHYSICAL EXAMINATION:  He is well appearing, in no distress.  Examination of the oral cavity reveals normal mucosa of the tongue, floor of mouth, hard and soft palate, gingival and buccal mucosa, retromolar trigone and posterior pharyngeal wall.  Palpation of floor of mouth, tongue and base of tongue are negative.  He cannot tolerate mirror exam.  Examination of the neck reveals an approximately 4 cm mass in the left level II/III junction that is mobile.  I do not palpate any additional lymphatic disease.  It is difficult to feel his thyroid.      PROCEDURE:  Flexible endoscopy is performed through the left nasal cavity after anesthetization and vasoconstriction using Thaddeus-Synephrine and Xylocaine.  This reveals normal nasopharynx, oropharynx, hypopharynx and larynx.  The vocal folds are mobile bilaterally.      IMPRESSION:  Papillary thyroid cancer with metastases to the left neck.       PLAN:  1.  Ultrasound of levels 2-7 to determine if the right neck is involved.  2.  Based on Rec #33 in the 2015 guidelines and Frances et al, Thyroid. 2012 Sep; 22(9): 926-930 and  Vicenta et al, Thyroid. 2014 May;24(5):872-7, I am comfortable obtaining a CT w contrast at this point to cross sectionally image the neck with particular interest in the low neck and level 5.  I anticipate he will need PIEDRA after surgery but Iodine should be cleared by then.  3.  I discussed surgery w/him which would consist of total thyroid, level 6/7 and left neck dissection.  Possible right neck dissection.  He understands the risks including, but not limited to,  injury to one/both RLN, temporary or permanent hypoparathyroidism, bleeding and infection.      Yunier Rodriguez M.D.        Teaching Flowsheet - ENT   Relevant Diagnosis: papillary thyroid cancer  Teaching Topic: total thyroidectomy, central neck dissections, left neck  dissection possible right neck dissection   Person(s) involved in teaching: patient, daughter, son and interpretor        Motivation Level:  Asks Questions:   Yes  Eager to Learn:   Yes  Cooperative:   Yes  Receptive (willing/able to accept information):   Yes  Comments: Reviewed pre-op H and P,  NPO prior to  surgery,  pre-op scrub (given Hibiclens)  Reviewed post-op  cares , activity and pain.     Patient demonstrates understanding of the following:  Reason for the appointment, diagnosis and treatment plan:   Yes  Knowledge of proper use of medications and conditions for which they are ordered (with special attention to potential side effects or drug interactions):  stop aspirin products 1 week before surgery Yes  Which situations necessitate calling provider and whom to contact:   Yes  Nutritional needs and diet plan:   Yes  Pain management techniques:   Yes  Patient instructed on hand hygiene:  Yes  How and/when to access community resources:   Yes     Infection Prevention:  Patient   demonstrates understanding of the following:  Surgical procedure site care taught Yes  Signs and symptoms of infection taught Yes  Wound care taught Yes  Instructional Materials Used/Given: pre- op booklet,verbal  Instruction.    Calli Turner RN, BSN    Again, thank you for allowing me to participate in the care of your patient.      Sincerely,    Yunier Rodriguez MD

## 2019-04-08 NOTE — PROGRESS NOTES
HISTORY OF PRESENT ILLNESS:  Mr. Rene is a 68-year-old man who recently noticed a left neck mass.  This has been biopsied and found to contain metastatic papillary thyroid cancer.  He therefore has been referred for surgical management.  The patient denies any history of radiation or endocrine type tumors in his family.  He has not noticed any hoarseness or dysphagia.  He has not noticed any lumps anywhere else in his neck.         Past Medical History:   Diagnosis Date     HTN (hypertension)      Malignant neoplasm of thyroid gland (H) 3/25/2019     No past surgical history on file.    Current Outpatient Medications:      acetaminophen (TYLENOL) 325 MG tablet, Take 325-650 mg by mouth every 6 hours as needed for mild pain, Disp: , Rfl:      omeprazole (PRILOSEC) 20 MG DR capsule, Take 1 capsule (20 mg) by mouth daily, Disp: 90 capsule, Rfl: 3     OMEPRAZOLE PO, Take 20 mg by mouth, Disp: , Rfl:      telmisartan (MICARDIS) 40 MG tablet, Take 1 tablet (40 mg) by mouth daily, Disp: 90 tablet, Rfl: 3     UNABLE TO FIND, 20 mg MEDICATION NAME: elisabeth, Disp: , Rfl:   Allergies   Allergen Reactions     Penicillins Hives     Social History     Tobacco Use     Smoking status: Former Smoker     Smokeless tobacco: Never Used   Substance Use Topics     Alcohol use: Yes     Alcohol/week: 2.4 oz     Types: 4 Cans of beer per week     Frequency: Monthly or less     Review Of Systems  Skin: negative  Eyes: negative  Ears/Nose/Throat: negative  Respiratory: No shortness of breath, dyspnea on exertion, cough, or hemoptysis  Cardiovascular: negative  Gastrointestinal: negative  Genitourinary: negative  Musculoskeletal: negative  Neurologic: negative  Psychiatric: negative  Hematologic/Lymphatic/Immunologic: negative  Endocrine: negative  PHYSICAL EXAMINATION:  He is well appearing, in no distress.  Examination of the oral cavity reveals normal mucosa of the tongue, floor of mouth, hard and soft palate, gingival and buccal mucosa,  retromolar trigone and posterior pharyngeal wall.  Palpation of floor of mouth, tongue and base of tongue are negative.  He cannot tolerate mirror exam.  Examination of the neck reveals an approximately 4 cm mass in the left level II/III junction that is mobile.  I do not palpate any additional lymphatic disease.  It is difficult to feel his thyroid.      PROCEDURE:  Flexible endoscopy is performed through the left nasal cavity after anesthetization and vasoconstriction using Thaddeus-Synephrine and Xylocaine.  This reveals normal nasopharynx, oropharynx, hypopharynx and larynx.  The vocal folds are mobile bilaterally.      IMPRESSION:  Papillary thyroid cancer with metastases to the left neck.       PLAN:  1.  Ultrasound of levels 2-7 to determine if the right neck is involved.  2.  Based on Rec #33 in the 2015 guidelines and Frances et al, Thyroid. 2012 Sep; 22(9): 926-930 and  Vicenta et al, Thyroid. 2014 May;24(5):872-7, I am comfortable obtaining a CT w contrast at this point to cross sectionally image the neck with particular interest in the low neck and level 5.  I anticipate he will need PIEDRA after surgery but Iodine should be cleared by then.  3.  I discussed surgery w/him which would consist of total thyroid, level 6/7 and left neck dissection.  Possible right neck dissection.  He understands the risks including, but not limited to,  injury to one/both RLN, temporary or permanent hypoparathyroidism, bleeding and infection.      Yunier Rodriguez M.D.

## 2019-04-08 NOTE — TELEPHONE ENCOUNTER
Patient is scheduled for surgery with Dr. Rodriguez  Spoke or left message with: Patient while in ENT Clinic today.  Date of Surgery: 4/25/19  Location:  OR [place]  Informed patient they will need an adult  Yes  Pre-op with surgeon (if applicable): N/A  H&P: Scheduled with PCPAdditional imaging/appointments: N/A  Surgery packet: Given to patient by nurse coordinator while in ENT Clinic today.  Additional comments: Postop appt 5/6/19

## 2019-04-08 NOTE — NURSING NOTE
Chief Complaint   Patient presents with     Consult     Malignant neoplasm of thyroid gland     Thaddeus Padilla, EMT

## 2019-04-08 NOTE — PROGRESS NOTES
Teaching Flowsheet - ENT   Relevant Diagnosis: papillary thyroid cancer  Teaching Topic: total thyroidectomy, central neck dissections, left neck dissection possible right neck dissection   Person(s) involved in teaching: patient, daughter, son and interpretor        Motivation Level:  Asks Questions:   Yes  Eager to Learn:   Yes  Cooperative:   Yes  Receptive (willing/able to accept information):   Yes  Comments: Reviewed pre-op H and P,  NPO prior to  surgery,  pre-op scrub (given Hibiclens)  Reviewed post-op  cares , activity and pain.     Patient demonstrates understanding of the following:  Reason for the appointment, diagnosis and treatment plan:   Yes  Knowledge of proper use of medications and conditions for which they are ordered (with special attention to potential side effects or drug interactions):  stop aspirin products 1 week before surgery Yes  Which situations necessitate calling provider and whom to contact:   Yes  Nutritional needs and diet plan:   Yes  Pain management techniques:   Yes  Patient instructed on hand hygiene:  Yes  How and/when to access community resources:   Yes     Infection Prevention:  Patient   demonstrates understanding of the following:  Surgical procedure site care taught Yes  Signs and symptoms of infection taught Yes  Wound care taught Yes  Instructional Materials Used/Given: pre- op booklet,verbal  Instruction.    Calli Turner, RN, BSN

## 2019-04-09 ENCOUNTER — OFFICE VISIT (OUTPATIENT)
Dept: FAMILY MEDICINE | Facility: CLINIC | Age: 69
End: 2019-04-09
Payer: MEDICARE

## 2019-04-09 VITALS
HEART RATE: 86 BPM | DIASTOLIC BLOOD PRESSURE: 81 MMHG | OXYGEN SATURATION: 97 % | TEMPERATURE: 98.3 F | WEIGHT: 166 LBS | BODY MASS INDEX: 29.41 KG/M2 | SYSTOLIC BLOOD PRESSURE: 139 MMHG

## 2019-04-09 DIAGNOSIS — R22.1 MASS OF LEFT SIDE OF NECK: ICD-10-CM

## 2019-04-09 DIAGNOSIS — Z01.818 PREOP GENERAL PHYSICAL EXAM: Primary | ICD-10-CM

## 2019-04-09 DIAGNOSIS — I10 ESSENTIAL HYPERTENSION: Chronic | ICD-10-CM

## 2019-04-09 DIAGNOSIS — C73 MALIGNANT NEOPLASM OF THYROID GLAND (H): ICD-10-CM

## 2019-04-09 PROCEDURE — 93000 ELECTROCARDIOGRAM COMPLETE: CPT | Performed by: FAMILY MEDICINE

## 2019-04-09 PROCEDURE — 99215 OFFICE O/P EST HI 40 MIN: CPT | Performed by: FAMILY MEDICINE

## 2019-04-09 ASSESSMENT — PAIN SCALES - GENERAL: PAINLEVEL: NO PAIN (0)

## 2019-04-09 ASSESSMENT — PATIENT HEALTH QUESTIONNAIRE - PHQ9: SUM OF ALL RESPONSES TO PHQ QUESTIONS 1-9: 10

## 2019-04-09 NOTE — TELEPHONE ENCOUNTER
To ENT,    Please call pt's daughter Kisha (her number is the number on file for the patient, she speaks English) to schedule with Dr. Loja for cytology positive thyroid cancer, surgery consult. Referral from Dr Brown. Imaging and biopsy results all in Epic.     Thanks,  Mitzi  [phone]

## 2019-04-09 NOTE — PATIENT INSTRUCTIONS
Before Your Surgery      Call your surgeon if there is any change in your health. This includes signs of a cold or flu (such as a sore throat, runny nose, cough, rash or fever).    Do not smoke, drink alcohol or take over the counter medicine (unless your surgeon or primary care doctor tells you to) for the 24 hours before and after surgery.    If you take prescribed drugs: Follow your doctor s orders about which medicines to take and which to stop until after surgery.    Eating and drinking prior to surgery: follow the instructions from your surgeon    Take a shower or bath the night before surgery. Use the soap your surgeon gave you to gently clean your skin. If you do not have soap from your surgeon, use your regular soap. Do not shave or scrub the surgery site.  Wear clean pajamas and have clean sheets on your bed.     Nothing to eat or drink after Midnight.  Hold all NSAID and blood thinner 7 days, before surgery ( Aspirin, Ibuprofen, Naproxen, etc, ), and Coumadin.  May take blood pressure medication, the morning of your surgery with sip of water, as directed.

## 2019-04-09 NOTE — PROGRESS NOTES
[address] [id number] [phone]  Dept: [phone]    PRE-OP EVALUATION:  Today's date: 2019    Price Rene (: 1950) presents for pre-operative evaluation assessment as requested by Dr. Rodriguez.  He requires evaluation and anesthesia risk assessment prior to undergoing surgery/procedure for treatment of repair of thyroid .    Proposed Surgery/ Procedure: Repair Thyroid  Date of Surgery/ Procedure: 2019  Time of Surgery/ Procedure: [facility]/Surgical Facility: [facility]  Fax number for surgical facility: [phone]  Primary Physician: No Ref-Primary, Physician  Type of Anesthesia Anticipated: to be determined    Patient has a Health Care Directive or Living Will:  NO    1. Yes - Do you have a history of heart attack, stroke, stent, bypass or surgery on an artery in the head, neck, heart or legs?  2. Yes - Do you ever have any pain or discomfort in your chest? More with cough,   Denies chest pain or short of breath with walking.  3. NO - Do you have a history of  Heart Failure?  4. NO - Are you troubled by shortness of breath when: walking on the level, up a slight hill or at night?  5. NO - Do you currently have a cold, bronchitis or other respiratory infection?  6. NO - Do you have a cough, shortness of breath or wheezing?  7. NO - Do you sometimes get pains in the calves of your legs when you walk?  8. NO - Do you or anyone in your family have previous history of blood clots?  9. NO - Do you or does anyone in your family have a serious bleeding problem such as prolonged bleeding following surgeries or cuts?  10. NO - Have you ever had problems with anemia or been told to take iron pills?  11. NO - HAVE YOU HAD ANY ABNORMAL BLOOD LOSS SUCH AS BLACK, TARRY OR BLOODY STOOLS, OR ABNORMAL VAGINAL BLEEDING? No,   11. NO - Have you had any abnormal blood loss such as black, tarry or  bloody stools, or abnormal vaginal bleeding?  12. NO - Have you ever had a blood transfusion?  13. NO - Have you or any of your relatives ever had problems with anesthesia?  14. NO - Do you have sleep apnea, excessive snoring or daytime drowsiness?  15. NO - Do you have any prosthetic heart valves?  16. NO - Do you have prosthetic joints?  17. NO - Is there any chance that you may be pregnant?    HPI:     HPI related to upcoming procedure: Patient is scheduled to have a total thyroidectomy.  Left neck mass dissection.  Patient initially, presented with left neck mass.  Fine-needle biopsy showed thyroid carcinoma.    He he denies chest pain, short of breath denies any previous history of heart disease.  Denies being lightheaded or dizzy.  No recent sickness.    History of hypertension blood pressure has been well controlled.  Denies being lightheaded or dizzy has no lower extremity edema.    See problem list for active medical problems.  Problems all longstanding and stable, except as noted/documented.  See ROS for pertinent symptoms related to these conditions.                                                                                                                                                          .    MEDICAL HISTORY:     Patient Active Problem List    Diagnosis Date Noted     Mass of left side of neck 03/25/2019     Priority: Medium     Malignant neoplasm of thyroid gland (H) 03/25/2019     Priority: Medium     HTN (hypertension)      Priority: Medium      Past Medical History:   Diagnosis Date     HTN (hypertension)      Malignant neoplasm of thyroid gland (H) 3/25/2019     History reviewed. No pertinent surgical history.  Current Outpatient Medications   Medication Sig Dispense Refill     acetaminophen (TYLENOL) 325 MG tablet Take 325-650 mg by mouth every 6 hours as needed for mild pain       omeprazole (PRILOSEC) 20 MG DR capsule Take 1 capsule (20 mg) by mouth daily 90 capsule 3     OMEPRAZOLE  PO Take 20 mg by mouth       telmisartan (MICARDIS) 40 MG tablet Take 1 tablet (40 mg) by mouth daily 90 tablet 3     UNABLE TO FIND 20 mg MEDICATION NAME: natrazim       OTC products: None, except as noted above    Allergies   Allergen Reactions     Penicillins Hives      Latex Allergy: NO    Social History     Tobacco Use     Smoking status: Former Smoker     Smokeless tobacco: Never Used   Substance Use Topics     Alcohol use: Yes     Alcohol/week: 2.4 oz     Types: 4 Cans of beer per week     Frequency: Monthly or less     History   Drug Use No       REVIEW OF SYSTEMS:   Constitutional, neuro, ENT, endocrine, pulmonary, cardiac, gastrointestinal, genitourinary, musculoskeletal, integument and psychiatric systems are negative, except as otherwise noted.    EXAM:   /81 (BP Location: Right arm, Patient Position: Chair, Cuff Size: Adult Regular)   Pulse 86   Temp 98.3  F (36.8  C) (Oral)   Wt 75.3 kg (166 lb)   SpO2 97%   BMI 29.41 kg/m      GENERAL APPEARANCE: healthy, alert and no distress     EYES: EOMI,  PERRL     HENT: ear canals and TM's normal and nose and mouth without ulcers or lesions     NECK: no adenopathy, no asymmetry, masses, or scars and thyroid normal to palpation     RESP: lungs clear to auscultation - no rales, rhonchi or wheezes     CV: regular rates and rhythm, normal S1 S2, no S3 or S4 and no murmur, click or rub     ABDOMEN:  soft, nontender, no HSM or masses and bowel sounds normal     MS: extremities normal- no gross deformities noted, no evidence of inflammation in joints, FROM in all extremities.     SKIN: no suspicious lesions or rashes     NEURO: Normal strength and tone, sensory exam grossly normal, mentation intact and speech normal     PSYCH: mentation appears normal. and affect normal/bright     LYMPHATICS: No cervical adenopathy    DIAGNOSTICS:   EKG: appears normal, NSR, Normal Sinus Rhythm, Left axis.    Recent Labs   Lab Test 04/08/19  1214 03/14/19  1653   HGB  --   16.7   PLT  --  104*   CR 0.64*  --         IMPRESSION:   Reason for surgery/procedure: Left neck mass, Thyroid cancer  Diagnosis/reason for consult: Total Thyroidectomy, left neck mass Dissection.    The proposed surgical procedure is considered INTERMEDIATE risk.    REVISED CARDIAC RISK INDEX  The patient has the following serious cardiovascular risks for perioperative complications such as (MI, PE, VFib and 3  AV Block):  No serious cardiac risks  INTERPRETATION: 1 risks: Class II (low risk - 0.9% complication rate),  History of  Hypertension,   The patient has the following additional risks for perioperative complications:  The ASCVD Risk score (Khushboo FLORES Jr., et al., 2013) failed to calculate for the following reasons:    Cannot find a previous HDL lab    Cannot find a previous total cholesterol lab      ICD-10-CM    1. Preop general physical exam Z01.818    2. Essential hypertension I10 EKG 12-lead complete w/read - Clinics   3. Mass of left side of neck R22.1    4. Malignant neoplasm of thyroid gland (H) C73      RECOMMENDATIONS:     --Consult hospital rounder / IM to assist post-op medical management    --Patient is to take all scheduled medications on the day of surgery EXCEPT for modifications listed below.    APPROVAL GIVEN to proceed with proposed procedure, without further diagnostic evaluation       Signed Electronically by: Michelle Menendez MD  Patient Instructions     Before Your Surgery      Call your surgeon if there is any change in your health. This includes signs of a cold or flu (such as a sore throat, runny nose, cough, rash or fever).    Do not smoke, drink alcohol or take over the counter medicine (unless your surgeon or primary care doctor tells you to) for the 24 hours before and after surgery.    If you take prescribed drugs: Follow your doctor s orders about which medicines to take and which to stop until after surgery.    Eating and drinking prior to surgery: follow the instructions from  your surgeon    Take a shower or bath the night before surgery. Use the soap your surgeon gave you to gently clean your skin. If you do not have soap from your surgeon, use your regular soap. Do not shave or scrub the surgery site.  Wear clean pajamas and have clean sheets on your bed.     Nothing to eat or drink after Midnight.  Hold all NSAID and blood thinner 7 days, before surgery ( Aspirin, Ibuprofen, Naproxen, etc, ), and Coumadin.  May take blood pressure medication, the morning of your surgery with sip of water, as directed.        Copy of this evaluation report is provided to requesting physician.    [facility] Preop Guidelines    Revised Cardiac Risk Index

## 2019-04-10 ENCOUNTER — ANCILLARY PROCEDURE (OUTPATIENT)
Dept: CT IMAGING | Facility: CLINIC | Age: 69
End: 2019-04-10
Attending: OTOLARYNGOLOGY
Payer: MEDICARE

## 2019-04-10 ENCOUNTER — OFFICE VISIT (OUTPATIENT)
Dept: ENDOCRINOLOGY | Facility: CLINIC | Age: 69
End: 2019-04-10
Attending: INTERNAL MEDICINE
Payer: MEDICARE

## 2019-04-10 ENCOUNTER — ANCILLARY PROCEDURE (OUTPATIENT)
Dept: ULTRASOUND IMAGING | Facility: CLINIC | Age: 69
End: 2019-04-10
Payer: MEDICARE

## 2019-04-10 ENCOUNTER — ANCILLARY PROCEDURE (OUTPATIENT)
Dept: ULTRASOUND IMAGING | Facility: CLINIC | Age: 69
End: 2019-04-10
Attending: OTOLARYNGOLOGY
Payer: MEDICARE

## 2019-04-10 VITALS
HEART RATE: 58 BPM | SYSTOLIC BLOOD PRESSURE: 166 MMHG | DIASTOLIC BLOOD PRESSURE: 88 MMHG | BODY MASS INDEX: 29.41 KG/M2 | HEIGHT: 63 IN | WEIGHT: 166.01 LBS

## 2019-04-10 DIAGNOSIS — R89.6 ABNORMAL CYTOLOGY: ICD-10-CM

## 2019-04-10 DIAGNOSIS — C73 PAPILLARY THYROID CARCINOMA (H): ICD-10-CM

## 2019-04-10 DIAGNOSIS — R22.1 MASS OF LEFT SIDE OF NECK: ICD-10-CM

## 2019-04-10 DIAGNOSIS — R22.1 MASS OF LEFT SIDE OF NECK: Primary | ICD-10-CM

## 2019-04-10 RX ORDER — IOPAMIDOL 755 MG/ML
100 INJECTION, SOLUTION INTRAVASCULAR ONCE
Status: COMPLETED | OUTPATIENT
Start: 2019-04-10 | End: 2019-04-10

## 2019-04-10 RX ADMIN — IOPAMIDOL 100 ML: 755 INJECTION, SOLUTION INTRAVASCULAR at 16:09

## 2019-04-10 ASSESSMENT — PAIN SCALES - GENERAL: PAINLEVEL: NO PAIN (0)

## 2019-04-10 ASSESSMENT — MIFFLIN-ST. JEOR: SCORE: 1408

## 2019-04-10 NOTE — DISCHARGE INSTRUCTIONS

## 2019-04-10 NOTE — PROGRESS NOTES
Endocrine Consult note    Attending Assessment/Plan :     Abnormal cytology left lateral cystic neck mass with mural papillary appearing solid area FNAB cytology consistent with papillary thyroid carcinoma  Ideally CT contrast would not be given as this could delay further post op diagnostics and treatment related to the thyroid cancer.    Https://www.ncbi.nlm.nih.gov/pubmed/90385834.  Note it takes longer to clear in older individuals.    That being said, the thyroid US does not show clear primary.  I have counseled Eros and his daughter on this.  I would still trust the cytology and proceed to total thyroidectomy and left lateral neck dissection at a minimum.    I have counseled him that he will need LT4 for life after the surgery  RTC 1-2 weeks post op    Left neck mass - as per # 1     Yanira Uribe MD      Chief complaint:  Eros is a 68 year old male seen in consultation at the request of Dr Rocael Brown  for thyroid cancer .    HISTORY OF PRESENT ILLNESS  Eros presents today, along with his daughter.     He had originally been referred to oncology, Dr Brown, whom he lst saw on 4/4/19.  Since then, he has been referred on to me and also Dr Yunier Rodriguez, whom he already saw on 4/8/19.  I have reviewed their notes. He told me that the neck mass was lst noted early March.  He recalls that he saw the doctor in [place] for the left neck mass and then he came here for evaluation and treatment due to having insurance.  The exact timeline of when the neck mass is therefore not perfectly clear.      He has already had FNAB which was read as positive for papillary thyroid carcinoma .Neck US was performed just prior to this appt and I have reviewed the images on PACS, reading it on the spot.     We have the following labs  4/8/19: TSH 1.2    I have reviewed images on PACS  3/14/19 CXR negative  3/20/19 FNAB left neck cystic mass 3.2 x 2.5 x 4.3 - it has one small papillary projection which was where the  needle sampled.    4/10/19 thyroid and neck US:   Left superior # 1 0.3  X0.2  X 0.3   Left inferior # 2 0.4 x 0.2  X 0.3 cm   Right level 3 0.8 x0.4 x 1.4 Echogenic hilum  Left level 3 # 1 0.6 x 0.3 x 0.7 cm   Left level 5  Cystic mass 3.2 x 2.3 x 4.1 cm - internal mural area highly suspicious with echogenic foci 1.1 x 1 x 1 cm  4/10/19 CT neck with contrast:   Confirms the US to my eye.    He is not aware of radiation exposure as a child.  [place] -there were a lot of chemicals there.     REVIEW OF SYSTEMS  Energy OK  Sleep OK  Cardiac: a little CP - not now; he notices at night intermittently x 20 years; 20 years ago he fainted while walking from [place]; 2 years ago stroke wanted to happen (RUE and right face, right leg going numb, getting dizzy) - he got medication in [place]  Respiratory: negative  GI: constipation;   No pains  Head hurts sometimes-- pain arises and stays and then resolves - it is only  since the biopsy   10 system ROS otherwise as per the HPI or negative    Past Medical History  Past Medical History:   Diagnosis Date     HTN (hypertension)      Malignant neoplasm of thyroid gland (H) 3/25/2019     Medications    Current Outpatient Medications   Medication Sig Dispense Refill     acetaminophen (TYLENOL) 325 MG tablet Take 325-650 mg by mouth every 6 hours as needed for mild pain       omeprazole (PRILOSEC) 20 MG DR capsule Take 1 capsule (20 mg) by mouth daily 90 capsule 3     OMEPRAZOLE PO Take 20 mg by mouth       telmisartan (MICARDIS) 40 MG tablet Take 1 tablet (40 mg) by mouth daily 90 tablet 3     UNABLE TO FIND 20 mg MEDICATION NAME: natrazim       calcitRIOL (ROCALTROL) 0.25 MCG capsule Take 1 capsule (0.25 mcg) by mouth 2 times daily for 14 days 28 capsule 0     calcium carbonate (TUMS) 500 MG chewable tablet Take 2 tablets (1,000 mg) by mouth 3 times daily for 14 days 84 tablet 1     levothyroxine (SYNTHROID/LEVOTHROID) 125 MCG tablet Take 1 tablet (125 mcg) by  "mouth daily 30 tablet 1     mineral oil-hydrophilic petrolatum (AQUAPHOR) external ointment Apply topically every 8 hours Apply to neck incision three times daily 50 g 1     oxyCODONE (ROXICODONE) 5 MG tablet Take 1-2 tablets (5-10 mg) by mouth every 4 hours as needed for moderate to severe pain 30 tablet 0     senna-docusate (SENOKOT-S/PERICOLACE) 8.6-50 MG tablet Take 1 tablet by mouth 2 times daily 30 tablet 0     Allergies  Allergies   Allergen Reactions     Penicillins Hives     Family History  family history includes Diabetes in his brother; Hypertension in his mother.    Social History  Social History     Tobacco Use     Smoking status: Former Smoker     Smokeless tobacco: Never Used   Substance Use Topics     Alcohol use: Yes     Alcohol/week: 2.4 oz     Types: 4 Cans of beer per week     Frequency: Monthly or less     Comment: once in awhile     Drug use: No     Arrived here 3/11/19    Physical Exam  /88   Pulse 58   Ht 1.6 m (5' 2.99\")   Wt 75.3 kg (166 lb 0.1 oz)   BMI 29.41 kg/m    Body mass index is 29.41 kg/m .  GENERAL :  Pleasant man In no apparent distress  SKIN: Normal color, normal temperature, texture.  No hirsutism, alopecia or purple striae.     EYES: PER, EOMI, No scleral icterus,  No proptosis, conjunctival redness, stare, retraction  NECK: palpable mass superior left lateral neck- feels higher than I expected based on the imaging  RESP: Lungs clear to auscultation bilaterally  CARDIAC: Regular rate and rhythm, normal S1 S2, without murmurs, rubs or gallops    ABDOMEN: Normal bowel sounds; soft, nontender, no HSM or masses       NEURO: awake, alert, responds appropriately to questions.  Cranial nerves intact.  Moves all extremities.  No tremor of the outstretched hand.  DTRs   0/4 ,   EXTREMITIES: No clubbing, cyanosis or edema.    DATA REVIEW    ENDO THYROID LABS-[facility] Latest Ref Rng & Units 4/8/2019   TSH 0.40 - 4.00 mU/L 1.20       Copath Report 03/20/2019 12:06 PM 88   Patient " Name: AAKASH BUCIO   MR#: [id number]   Specimen #: [id number]   Collected: 3/20/2019   Received: 3/21/2019   Reported: 3/22/2019 10:30   Ordering Phy(s): RAFI MONCADA     For improved result formatting, select 'View Enhanced Report Format' under    Linked Documents section.     SPECIMEN/STAIN PROCESS:   Neck, left mass level 5, ultrasound guided fine needle aspiration        Pap-Cyto x 3, Diff Quick Stain-cyto x 3, Cell Block w/ H&E-Cyto x 1,   Save Ribbon, 1 x 1, Cell Block,   Level 2 x 1, Save Ribbon, 2 x 1, Cell Block, Level 3 x 1     ----------------------------------------------------------------     CYTOLOGIC INTERPRETATION:      Neck, left mass level 5, ultrasound guided fine needle aspiration:   - Positive for malignancy   - Papillary thyroid carcinoma   Specimen Adequacy: Satisfactory for evaluation.     I have personally reviewed all specimens and/or slides, including the   listed special stains, and used them   with my medical judgement to determine or confirm the final diagnosis.     Electronically signed out by:   Isela Martinez M.D., [facility]     Processed and screened at [facility]     CLINICAL HISTORY:   Left neck mass level 5 (mid to low neck cystic part measures 3.2 x 2.5 x   4.2 cm and solid component measures   0.7x1.2x1.7 cm)     ,     GROSS:   Neck, left mass level 5, ultrasound guided fine needle aspiration:     Received are 3 fixed slides, processed for   Pap stain, 3 air dried slides, processed for Diff Quik stain, and material    in formalin, processed for one   hematoxylin stained cell block.     INTRAOPERATIVE CONSULTATION:   FNA Performance: Fine needle aspiration was not performed by [facility],    Pathology staff.   Immediate Adequacy: On site specimen adequacy evaluation was performed by   Dr. JUDITH Martinez MD via telepathology.     Onsite adequacy/interpretation:   Pass A1 inadequate.  Pass A2 put directly into formalin.   Pass A3-A4   adequate.     MICROSCOPIC:   Microscopic examination was performed.   Katie Edwards MD, Cytopathology Fellow; Isela Martinez MD, Attending     CPT Codes:    92050-CMTL-UEJ, 76661-XWDT-QPA   A: 68297-ZNMB, 29145-ZPS, HCB     TESTING LAB LOCATION:   [facility], [address]   [id number] [phone]     COLLECTION SITE:   Client:  [facility]   Location:  [facility] (B)      EXAMINATION: US THYROID, US HEAD NECK SOFT TISSUE, 4/10/2019 5:14 PM      COMPARISON: CT neck from the same day.     HISTORY: Mass of the left side of neck, abnormal cytology     Technique: Grayscale and color ultrasound imaging of the thyroid was  performed. Additional grayscale and color Doppler ultrasound images of  the cervical lymph nodes were performed.     Findings:    Thyroid parenchyma: heterogenous     The right lobe of the thyroid measures: 1.5 x 1.3 x 4.0 cm      The thyroid isthmus measures: 3 mm thick      The left lobe of the thyroid measures: 0.7 x 1.0 x 3.5 cm      Right lobe: No significant nodules.     Isthmus: No nodules     Left Lobe:   Nodule 1: Spongiform, hypoechoic, wider than tall, smoothly  marginated, punctate echogenic foci. TI-RADS 4  Nodule measurement: 3 x 2 x 2 mm      Nodule 2: Spongiform, hypoechoic, wider than tall, smoothly  marginated, punctate echogenic foci. TI-RADS 4  Nodule measurement: 4 x 2 x 3 mm     Cervical lymph nodes are measured bilaterally with measurements given  in craniocaudal, transverse and AP dimensions as follows:     Right:  Level 1: None  Level 2: 1.1 x 0.8 and 1.7 cm elliptical node with preserved fatty  hilum.  Level 3: 0.8 x 0.3 x 1.4 cm elongated node.  Level 4: None  Level 5: None  Level 6: Thyroid lobe.  Level 7: None     Left:  Level 1: None  Level 2: 1.9 x 0.8 x 1.6 cm round, cystic changed, solid component  concerning for metastasis.   Level 3:  0.6 and 0.8 and 0.7 cm ovoid with fatty hilum.   Level 4: None  Level 5: 3.1 x 2.3 x 4.1 cm mixed cystic and solid mass with layering  echoes in the background of anechoic fluid and heterogeneous,  hyperechoic solid component measuring 1.1 x 1.0 x 1.0 cm correlating  to the mixed cystic/solid mass seen on  CT.  Level 6: Thyroid lobe.  Level 7: None                                                                      IMPRESSION:  1. Left lobe of thyroid demonstrates two nodules with Ti-Rads 4  features (5 points - moderately suspicious) however at subcentimeter  size, these would be considered low risk. This is discordant with the  pathology results which demonstrates papillary thyroid carcinoma  metastasis.  2. Mixed cystic and solid left level 5 mass corresponding to findings  seen on  CT, biopsy-proven (3/20/2019) papillary thyroid  carcinoma.    3. Atypical 2cm left level 2 lymph node with appearance suspicious for  smaller metastatic focus.     I have personally reviewed the examination and initial interpretation  and I agree with the findings.     BUD BERNARD MD      CT SOFT TISSUE NECK W CONTRAST 4/10/2019 4:14 PM     History:  thyroid cancer. Please evaluate with CT neck with contrast;  Papillary thyroid carcinoma (H)  ICD-10: Papillary thyroid carcinoma (H)     Additional information obtained from EMR: Left neck mass biopsy  demonstrates positive for malignancy probably thyroid carcinoma.      Comparison:  Same day neck ultrasound      Technique: Following intravenous administration of nonionic iodinated  contrast medium, thin section helical CT images were obtained from the  skull base down to the level of the aortic arch.  Axial, coronal and  sagittal reformations were performed with 2-3 mm slice thickness  reconstruction. Images were reviewed in soft tissue, lung and bone  windows.     Contrast: Isovue 370 100ml     Findings:      Pharyngeal mucosal spaces of nasopharynx,  oropharynx, hypopharynx and  larynx are normal. Parotid glands are normal. Submandibular glands are  normal. No definite mass or calcification within the thyroid gland.  Vascular structures are grossly patent. There is atherosclerotic  calcification along the left distal common carotid extending to the  carotid bifurcation without hemodynamically significant stenosis.      Regarding the cervical lymph nodes there is a 3.1 x 2.7 cm partly  cystic partly solid lymph node spanning along the left levels 2 and 3.  Along posterior aspect of this node is the 1.2 cm solid component with  couple of coarse calcifications. Corresponding to the ultrasound  finding which describes a lymph node in left level 2 there is no  concerning lymphadenopathy identified however at level 3 there is a 6  mm partly calcified node and in left level 4 there are couple of  closely adjacent calcifications the largest measuring 4.4 mm. In  anterior prevascular space in superior mediastinum there is a punctate  calcification. There is no lymphadenopathy in the right side of the  neck.  Lung apices are clear.   Osseous structures are intact. There is deformity of the right lamina  papyracea likely sequela of prior blowout fracture. Thin mucosal  thickening of bilateral maxillary sinuses with osseous wall thickening  of the right maxillary sinus. Mastoid air cells are clear. The  remainder of the paranasal sinuses are clear.                                                                      IMPRESSION:      3.1 x 2.7 cm partly solid, partly cystic mary anne mass in the left level  2/3. It is pathology proven to be papillary thyroid cancer metastasis.     6 mm partly calcified node in the left upper level 3 and couple of  closely adjacent 4 mm calcifications in left level 4 might represent  early metastases.      Corresponding to the ultrasound finding describing a left level 2  suspicious lymphadenopathy no definite enlarged or calcified node  is  visualized on CT.     No visible abnormality in thyroid gland on CT.      TASHA SORTO MD

## 2019-04-10 NOTE — LETTER
4/10/2019       RE: Eros Rene  [address]  [place] [facility] [address]     Dear Colleague,    Thank you for referring your patient, Eros Rene, to the [facility] ENDOCRINOLOGY at [facility]. Please see a copy of my visit note below.    Endocrine Consult note    Attending Assessment/Plan :     Abnormal cytology left lateral cystic neck mass with mural papillary appearing solid area FNAB cytology consistent with papillary thyroid carcinoma  Ideally CT contrast would not be given as this could delay further post op diagnostics and treatment related to the thyroid cancer.    Https://www.ncbi.nlm.nih.gov/pubmed/81974730.  Note it takes longer to clear in older individuals.    That being said, the thyroid US does not show clear primary.  I have counseled Eros and his daughter on this.  I would still trust the cytology and proceed to total thyroidectomy and left lateral neck dissection at a minimum.    I have counseled him that he will need LT4 for life after the surgery  RTC 1-2 weeks post op    Left neck mass - as per # 1     Yanira Uribe MD      Chief complaint:  Eros is a 68 year old male seen in consultation at the request of Dr Rocael Brown  for thyroid cancer .    HISTORY OF PRESENT ILLNESS  Eros presents today, along with his daughter.     He had originally been referred to oncology, Dr Brown, whom he lst saw on 4/4/19.  Since then, he has been referred on to me and also Dr Yunier Rodriguez, whom he already saw on 4/8/19.  I have reviewed their notes. He told me that the neck mass was lst noted early March.  He recalls that he saw the doctor in [place] for the left neck mass and then he came here for evaluation and treatment due to having insurance.  The exact timeline of when the neck mass is therefore not perfectly clear.      He has already had FNAB which was read as positive for papillary thyroid carcinoma .Neck US was  performed just prior to this appt and I have reviewed the images on PACS, reading it on the spot.     We have the following labs  4/8/19: TSH 1.2    I have reviewed images on PACS  3/14/19 CXR negative  3/20/19 FNAB left neck cystic mass 3.2 x 2.5 x 4.3 - it has one small papillary projection which was where the needle sampled.    4/10/19 thyroid and neck US:   Left superior # 1 0.3  X0.2  X 0.3   Left inferior # 2 0.4 x 0.2  X 0.3 cm   Right level 3 0.8 x0.4 x 1.4 Echogenic hilum  Left level 3 # 1 0.6 x 0.3 x 0.7 cm   Left level 5  Cystic mass 3.2 x 2.3 x 4.1 cm - internal mural area highly suspicious with echogenic foci 1.1 x 1 x 1 cm  4/10/19 CT neck with contrast:   Confirms the US to my eye.    He is not aware of radiation exposure as a child.  [place] -[place] were a lot of chemicals there.     REVIEW OF SYSTEMS  Energy OK  Sleep OK  Cardiac: a little CP - not now; he notices at night intermittently x 20 years; 20 years ago he fainted while walking from [place]; 2 years ago stroke wanted to happen (RUE and right face, right leg going numb, getting dizzy) - he got medication in [place]  Respiratory: negative  GI: constipation;   No pains  Head hurts sometimes-- pain arises and stays and then resolves - it is only  since the biopsy   10 system ROS otherwise as per the HPI or negative    Past Medical History  Past Medical History:   Diagnosis Date     HTN (hypertension)      Malignant neoplasm of thyroid gland (H) 3/25/2019     Medications    Current Outpatient Medications   Medication Sig Dispense Refill     acetaminophen (TYLENOL) 325 MG tablet Take 325-650 mg by mouth every 6 hours as needed for mild pain       omeprazole (PRILOSEC) 20 MG DR capsule Take 1 capsule (20 mg) by mouth daily 90 capsule 3     OMEPRAZOLE PO Take 20 mg by mouth       telmisartan (MICARDIS) 40 MG tablet Take 1 tablet (40 mg) by mouth daily 90 tablet 3     UNABLE TO FIND 20 mg MEDICATION NAME: natrazim        "calcitRIOL (ROCALTROL) 0.25 MCG capsule Take 1 capsule (0.25 mcg) by mouth 2 times daily for 14 days 28 capsule 0     calcium carbonate (TUMS) 500 MG chewable tablet Take 2 tablets (1,000 mg) by mouth 3 times daily for 14 days 84 tablet 1     levothyroxine (SYNTHROID/LEVOTHROID) 125 MCG tablet Take 1 tablet (125 mcg) by mouth daily 30 tablet 1     mineral oil-hydrophilic petrolatum (AQUAPHOR) external ointment Apply topically every 8 hours Apply to neck incision three times daily 50 g 1     oxyCODONE (ROXICODONE) 5 MG tablet Take 1-2 tablets (5-10 mg) by mouth every 4 hours as needed for moderate to severe pain 30 tablet 0     senna-docusate (SENOKOT-S/PERICOLACE) 8.6-50 MG tablet Take 1 tablet by mouth 2 times daily 30 tablet 0     Allergies  Allergies   Allergen Reactions     Penicillins Hives     Family History  family history includes Diabetes in his brother; Hypertension in his mother.    Social History  Social History     Tobacco Use     Smoking status: Former Smoker     Smokeless tobacco: Never Used   Substance Use Topics     Alcohol use: Yes     Alcohol/week: 2.4 oz     Types: 4 Cans of beer per week     Frequency: Monthly or less     Comment: once in awhile     Drug use: No     Arrived here 3/11/19    Physical Exam  /88   Pulse 58   Ht 1.6 m (5' 2.99\")   Wt 75.3 kg (166 lb 0.1 oz)   BMI 29.41 kg/m     Body mass index is 29.41 kg/m .  GENERAL :  Pleasant man In no apparent distress  SKIN: Normal color, normal temperature, texture.  No hirsutism, alopecia or purple striae.     EYES: PER, EOMI, No scleral icterus,  No proptosis, conjunctival redness, stare, retraction  NECK: palpable mass superior left lateral neck- feels higher than I expected based on the imaging  RESP: Lungs clear to auscultation bilaterally  CARDIAC: Regular rate and rhythm, normal S1 S2, without murmurs, rubs or gallops    ABDOMEN: Normal bowel sounds; soft, nontender, no HSM or masses       NEURO: awake, alert, responds " appropriately to questions.  Cranial nerves intact.  Moves all extremities.  No tremor of the outstretched hand.  DTRs   0/4 ,   EXTREMITIES: No clubbing, cyanosis or edema.    DATA REVIEW    ENDO THYROID LABS-[facility] Latest Ref Rng & Units 4/8/2019   TSH 0.40 - 4.00 mU/L 1.20       Copath Report 03/20/2019 12:06 PM 88   Patient Name: AAKASH BUCIO   MR#: [id number]   Specimen #: [id number]   Collected: 3/20/2019   Received: 3/21/2019   Reported: 3/22/2019 10:30   Ordering Phy(s): RAFI MONCADA     For improved result formatting, select 'View Enhanced Report Format' under    Linked Documents section.     SPECIMEN/STAIN PROCESS:   Neck, left mass level 5, ultrasound guided fine needle aspiration        Pap-Cyto x 3, Diff Quick Stain-cyto x 3, Cell Block w/ H&E-Cyto x 1,   Save Ribbon, 1 x 1, Cell Block,   Level 2 x 1, Save Ribbon, 2 x 1, Cell Block, Level 3 x 1     ----------------------------------------------------------------     CYTOLOGIC INTERPRETATION:      Neck, left mass level 5, ultrasound guided fine needle aspiration:   - Positive for malignancy   - Papillary thyroid carcinoma   Specimen Adequacy: Satisfactory for evaluation.     I have personally reviewed all specimens and/or slides, including the   listed special stains, and used them   with my medical judgement to determine or confirm the final diagnosis.     Electronically signed out by:   Isela Martinez M.D., Physicians     Processed and screened at [facility],   [facility]     CLINICAL HISTORY:   Left neck mass level 5 (mid to low neck cystic part measures 3.2 x 2.5 x   4.2 cm and solid component measures   0.7x1.2x1.7 cm)     ,     GROSS:   Neck, left mass level 5, ultrasound guided fine needle aspiration:     Received are 3 fixed slides, processed for   Pap stain, 3 air dried slides, processed for Diff Quik stain, and material    in formalin, processed for one   hematoxylin stained cell block.      INTRAOPERATIVE CONSULTATION:   FNA Performance: Fine needle aspiration was not performed by [facility],    Pathology staff.   Immediate Adequacy: On site specimen adequacy evaluation was performed by   Dr. JUDITH Martinez MD via telepathology.     Onsite adequacy/interpretation:   Pass A1 inadequate.  Pass A2 put directly into formalin.  Pass A3-A4   adequate.     MICROSCOPIC:   Microscopic examination was performed.   Katie Edwards MD, Cytopathology Fellow; Isela Martinez MD, Attending     CPT Codes:    98506-WUVG-ESA, 97175-CUCJ-IDC   A: 55216-YLJZ, 50152-QUH, HCB     TESTING LAB LOCATION:   [facility], [facility] [address]   [address] [place] [address]   [id number] [phone]     COLLECTION SITE:   Client:  [facility]   Location:  [facility] ()      EXAMINATION: US THYROID, US HEAD NECK SOFT TISSUE, 4/10/2019 5:14 PM      COMPARISON: CT neck from the same day.     HISTORY: Mass of the left side of neck, abnormal cytology     Technique: Grayscale and color ultrasound imaging of the thyroid was  performed. Additional grayscale and color Doppler ultrasound images of  the cervical lymph nodes were performed.     Findings:    Thyroid parenchyma: heterogenous     The right lobe of the thyroid measures: 1.5 x 1.3 x 4.0 cm      The thyroid isthmus measures: 3 mm thick      The left lobe of the thyroid measures: 0.7 x 1.0 x 3.5 cm      Right lobe: No significant nodules.     Isthmus: No nodules     Left Lobe:   Nodule 1: Spongiform, hypoechoic, wider than tall, smoothly  marginated, punctate echogenic foci. TI-RADS 4  Nodule measurement: 3 x 2 x 2 mm      Nodule 2: Spongiform, hypoechoic, wider than tall, smoothly  marginated, punctate echogenic foci. TI-RADS 4  Nodule measurement: 4 x 2 x 3 mm     Cervical lymph nodes are measured bilaterally with measurements given  in craniocaudal, transverse and AP dimensions as follows:     Right:  Level  1: None  Level 2: 1.1 x 0.8 and 1.7 cm elliptical node with preserved fatty  hilum.  Level 3: 0.8 x 0.3 x 1.4 cm elongated node.  Level 4: None  Level 5: None  Level 6: Thyroid lobe.  Level 7: None     Left:  Level 1: None  Level 2: 1.9 x 0.8 x 1.6 cm round, cystic changed, solid component  concerning for metastasis.   Level 3: 0.6 and 0.8 and 0.7 cm ovoid with fatty hilum.   Level 4: None  Level 5: 3.1 x 2.3 x 4.1 cm mixed cystic and solid mass with layering  echoes in the background of anechoic fluid and heterogeneous,  hyperechoic solid component measuring 1.1 x 1.0 x 1.0 cm correlating  to the mixed cystic/solid mass seen on  CT.  Level 6: Thyroid lobe.  Level 7: None                                                                      IMPRESSION:  1. Left lobe of thyroid demonstrates two nodules with Ti-Rads 4  features (5 points - moderately suspicious) however at subcentimeter  size, these would be considered low risk. This is discordant with the  pathology results which demonstrates papillary thyroid carcinoma  metastasis.  2. Mixed cystic and solid left level 5 mass corresponding to findings  seen on  CT, biopsy-proven (3/20/2019) papillary thyroid  carcinoma.    3. Atypical 2cm left level 2 lymph node with appearance suspicious for  smaller metastatic focus.     I have personally reviewed the examination and initial interpretation  and I agree with the findings.     BUD BERNARD MD      CT SOFT TISSUE NECK W CONTRAST 4/10/2019 4:14 PM     History:  thyroid cancer. Please evaluate with CT neck with contrast;  Papillary thyroid carcinoma (H)  ICD-10: Papillary thyroid carcinoma (H)     Additional information obtained from EMR: Left neck mass biopsy  demonstrates positive for malignancy probably thyroid carcinoma.      Comparison:  Same day neck ultrasound      Technique: Following intravenous administration of nonionic iodinated  contrast medium, thin section helical CT images were  obtained from the  skull base down to the level of the aortic arch.  Axial, coronal and  sagittal reformations were performed with 2-3 mm slice thickness  reconstruction. Images were reviewed in soft tissue, lung and bone  windows.     Contrast: Isovue 370 100ml     Findings:      Pharyngeal mucosal spaces of nasopharynx, oropharynx, hypopharynx and  larynx are normal. Parotid glands are normal. Submandibular glands are  normal. No definite mass or calcification within the thyroid gland.  Vascular structures are grossly patent. There is atherosclerotic  calcification along the left distal common carotid extending to the  carotid bifurcation without hemodynamically significant stenosis.      Regarding the cervical lymph nodes there is a 3.1 x 2.7 cm partly  cystic partly solid lymph node spanning along the left levels 2 and 3.  Along posterior aspect of this node is the 1.2 cm solid component with  couple of coarse calcifications. Corresponding to the ultrasound  finding which describes a lymph node in left level 2 there is no  concerning lymphadenopathy identified however at level 3 there is a 6  mm partly calcified node and in left level 4 there are couple of  closely adjacent calcifications the largest measuring 4.4 mm. In  anterior prevascular space in superior mediastinum there is a punctate  calcification. There is no lymphadenopathy in the right side of the  neck.  Lung apices are clear.   Osseous structures are intact. There is deformity of the right lamina  papyracea likely sequela of prior blowout fracture. Thin mucosal  thickening of bilateral maxillary sinuses with osseous wall thickening  of the right maxillary sinus. Mastoid air cells are clear. The  remainder of the paranasal sinuses are clear.                                                                      IMPRESSION:      3.1 x 2.7 cm partly solid, partly cystic mary anne mass in the left level  2/3. It is pathology proven to be papillary thyroid  cancer metastasis.     6 mm partly calcified node in the left upper level 3 and couple of  closely adjacent 4 mm calcifications in left level 4 might represent  early metastases.      Corresponding to the ultrasound finding describing a left level 2  suspicious lymphadenopathy no definite enlarged or calcified node is  visualized on CT.     No visible abnormality in thyroid gland on CT.      TASHA SORTO MD    Again, thank you for allowing me to participate in the care of your patient.      Sincerely,    Yanira Uribe MD

## 2019-04-23 ENCOUNTER — ANESTHESIA EVENT (OUTPATIENT)
Dept: SURGERY | Facility: CLINIC | Age: 69
DRG: 627 | End: 2019-04-23
Payer: MEDICARE

## 2019-04-25 ENCOUNTER — OFFICE VISIT (OUTPATIENT)
Dept: INTERPRETER SERVICES | Facility: CLINIC | Age: 69
End: 2019-04-25
Payer: MEDICARE

## 2019-04-25 ENCOUNTER — ANESTHESIA (OUTPATIENT)
Dept: SURGERY | Facility: CLINIC | Age: 69
DRG: 627 | End: 2019-04-25
Payer: MEDICARE

## 2019-04-25 ENCOUNTER — HOSPITAL ENCOUNTER (INPATIENT)
Facility: CLINIC | Age: 69
LOS: 1 days | Discharge: HOME | DRG: 627 | End: 2019-04-26
Attending: OTOLARYNGOLOGY | Admitting: OTOLARYNGOLOGY
Payer: MEDICARE

## 2019-04-25 DIAGNOSIS — C73 PAPILLARY THYROID CARCINOMA (H): Primary | ICD-10-CM

## 2019-04-25 LAB
ABO + RH BLD: NORMAL
ABO + RH BLD: NORMAL
BLD GP AB SCN SERPL QL: NORMAL
BLOOD BANK CMNT PATIENT-IMP: NORMAL
CALCIUM SERPL-MCNC: 8.4 MG/DL (ref 8.5–10.1)
CREAT SERPL-MCNC: 0.76 MG/DL (ref 0.66–1.25)
GFR SERPL CREATININE-BSD FRML MDRD: >90 ML/MIN/{1.73_M2}
GLUCOSE BLDC GLUCOMTR-MCNC: 102 MG/DL (ref 70–99)
PLATELET # BLD AUTO: 133 10E9/L (ref 150–450)
PTH-INTACT SERPL-MCNC: 28 PG/ML (ref 18–80)
SPECIMEN EXP DATE BLD: NORMAL

## 2019-04-25 PROCEDURE — 07T20ZZ RESECTION OF LEFT NECK LYMPHATIC, OPEN APPROACH: ICD-10-PCS | Performed by: OTOLARYNGOLOGY

## 2019-04-25 PROCEDURE — 12000001 ZZH R&B MED SURG/OB UMMC

## 2019-04-25 PROCEDURE — 36000062 ZZH SURGERY LEVEL 4 1ST 30 MIN - UMMC: Performed by: OTOLARYNGOLOGY

## 2019-04-25 PROCEDURE — 83970 ASSAY OF PARATHORMONE: CPT | Performed by: OTOLARYNGOLOGY

## 2019-04-25 PROCEDURE — 36000064 ZZH SURGERY LEVEL 4 EA 15 ADDTL MIN - UMMC: Performed by: OTOLARYNGOLOGY

## 2019-04-25 PROCEDURE — 25000128 H RX IP 250 OP 636: Performed by: NURSE ANESTHETIST, CERTIFIED REGISTERED

## 2019-04-25 PROCEDURE — 88305 TISSUE EXAM BY PATHOLOGIST: CPT | Performed by: OTOLARYNGOLOGY

## 2019-04-25 PROCEDURE — 37000009 ZZH ANESTHESIA TECHNICAL FEE, EACH ADDTL 15 MIN: Performed by: OTOLARYNGOLOGY

## 2019-04-25 PROCEDURE — 85049 AUTOMATED PLATELET COUNT: CPT | Performed by: OTOLARYNGOLOGY

## 2019-04-25 PROCEDURE — 82565 ASSAY OF CREATININE: CPT | Performed by: OTOLARYNGOLOGY

## 2019-04-25 PROCEDURE — A9270 NON-COVERED ITEM OR SERVICE: HCPCS | Performed by: STUDENT IN AN ORGANIZED HEALTH CARE EDUCATION/TRAINING PROGRAM

## 2019-04-25 PROCEDURE — 71000015 ZZH RECOVERY PHASE 1 LEVEL 2 EA ADDTL HR: Performed by: OTOLARYNGOLOGY

## 2019-04-25 PROCEDURE — 25000128 H RX IP 250 OP 636: Performed by: ANESTHESIOLOGY

## 2019-04-25 PROCEDURE — 40000171 ZZH STATISTIC PRE-PROCEDURE ASSESSMENT III: Performed by: OTOLARYNGOLOGY

## 2019-04-25 PROCEDURE — 88307 TISSUE EXAM BY PATHOLOGIST: CPT | Performed by: OTOLARYNGOLOGY

## 2019-04-25 PROCEDURE — 0GSL0ZZ REPOSITION RIGHT SUPERIOR PARATHYROID GLAND, OPEN APPROACH: ICD-10-PCS | Performed by: OTOLARYNGOLOGY

## 2019-04-25 PROCEDURE — 25000125 ZZHC RX 250: Performed by: NURSE ANESTHETIST, CERTIFIED REGISTERED

## 2019-04-25 PROCEDURE — 25000566 ZZH SEVOFLURANE, EA 15 MIN: Performed by: OTOLARYNGOLOGY

## 2019-04-25 PROCEDURE — 25800030 ZZH RX IP 258 OP 636: Performed by: OTOLARYNGOLOGY

## 2019-04-25 PROCEDURE — 86901 BLOOD TYPING SEROLOGIC RH(D): CPT | Performed by: ANESTHESIOLOGY

## 2019-04-25 PROCEDURE — 25000132 ZZH RX MED GY IP 250 OP 250 PS 637: Performed by: STUDENT IN AN ORGANIZED HEALTH CARE EDUCATION/TRAINING PROGRAM

## 2019-04-25 PROCEDURE — 27210794 ZZH OR GENERAL SUPPLY STERILE: Performed by: OTOLARYNGOLOGY

## 2019-04-25 PROCEDURE — 25000125 ZZHC RX 250: Performed by: STUDENT IN AN ORGANIZED HEALTH CARE EDUCATION/TRAINING PROGRAM

## 2019-04-25 PROCEDURE — 86850 RBC ANTIBODY SCREEN: CPT | Performed by: ANESTHESIOLOGY

## 2019-04-25 PROCEDURE — T1013 SIGN LANG/ORAL INTERPRETER: HCPCS | Mod: U3

## 2019-04-25 PROCEDURE — 25800030 ZZH RX IP 258 OP 636: Performed by: NURSE ANESTHETIST, CERTIFIED REGISTERED

## 2019-04-25 PROCEDURE — 0GTK0ZZ RESECTION OF THYROID GLAND, OPEN APPROACH: ICD-10-PCS | Performed by: OTOLARYNGOLOGY

## 2019-04-25 PROCEDURE — 88331 PATH CONSLTJ SURG 1 BLK 1SPC: CPT | Performed by: OTOLARYNGOLOGY

## 2019-04-25 PROCEDURE — 71000014 ZZH RECOVERY PHASE 1 LEVEL 2 FIRST HR: Performed by: OTOLARYNGOLOGY

## 2019-04-25 PROCEDURE — 86900 BLOOD TYPING SEROLOGIC ABO: CPT | Performed by: ANESTHESIOLOGY

## 2019-04-25 PROCEDURE — 82310 ASSAY OF CALCIUM: CPT | Performed by: OTOLARYNGOLOGY

## 2019-04-25 PROCEDURE — 36415 COLL VENOUS BLD VENIPUNCTURE: CPT | Performed by: OTOLARYNGOLOGY

## 2019-04-25 PROCEDURE — 25000128 H RX IP 250 OP 636: Performed by: OTOLARYNGOLOGY

## 2019-04-25 PROCEDURE — 00000146 ZZHCL STATISTIC GLUCOSE BY METER IP

## 2019-04-25 PROCEDURE — 37000008 ZZH ANESTHESIA TECHNICAL FEE, 1ST 30 MIN: Performed by: OTOLARYNGOLOGY

## 2019-04-25 PROCEDURE — 25000128 H RX IP 250 OP 636: Performed by: STUDENT IN AN ORGANIZED HEALTH CARE EDUCATION/TRAINING PROGRAM

## 2019-04-25 RX ORDER — TELMISARTAN 40 MG/1
40 TABLET ORAL DAILY
Status: DISCONTINUED | OUTPATIENT
Start: 2019-04-26 | End: 2019-04-25

## 2019-04-25 RX ORDER — HYDROMORPHONE HYDROCHLORIDE 1 MG/ML
.3-.5 INJECTION, SOLUTION INTRAMUSCULAR; INTRAVENOUS; SUBCUTANEOUS EVERY 5 MIN PRN
Status: DISCONTINUED | OUTPATIENT
Start: 2019-04-25 | End: 2019-04-25 | Stop reason: HOSPADM

## 2019-04-25 RX ORDER — AMOXICILLIN 250 MG
2 CAPSULE ORAL 2 TIMES DAILY
Status: DISCONTINUED | OUTPATIENT
Start: 2019-04-25 | End: 2019-04-26 | Stop reason: HOSPADM

## 2019-04-25 RX ORDER — ONDANSETRON 4 MG/1
4 TABLET, ORALLY DISINTEGRATING ORAL EVERY 6 HOURS PRN
Status: DISCONTINUED | OUTPATIENT
Start: 2019-04-25 | End: 2019-04-26 | Stop reason: HOSPADM

## 2019-04-25 RX ORDER — NALOXONE HYDROCHLORIDE 0.4 MG/ML
.1-.4 INJECTION, SOLUTION INTRAMUSCULAR; INTRAVENOUS; SUBCUTANEOUS
Status: DISCONTINUED | OUTPATIENT
Start: 2019-04-25 | End: 2019-04-25

## 2019-04-25 RX ORDER — ONDANSETRON 2 MG/ML
4 INJECTION INTRAMUSCULAR; INTRAVENOUS EVERY 30 MIN PRN
Status: DISCONTINUED | OUTPATIENT
Start: 2019-04-25 | End: 2019-04-25 | Stop reason: HOSPADM

## 2019-04-25 RX ORDER — SODIUM CHLORIDE, SODIUM LACTATE, POTASSIUM CHLORIDE, CALCIUM CHLORIDE 600; 310; 30; 20 MG/100ML; MG/100ML; MG/100ML; MG/100ML
INJECTION, SOLUTION INTRAVENOUS CONTINUOUS PRN
Status: DISCONTINUED | OUTPATIENT
Start: 2019-04-25 | End: 2019-04-25

## 2019-04-25 RX ORDER — DEXAMETHASONE SODIUM PHOSPHATE 10 MG/ML
INJECTION, SOLUTION INTRAMUSCULAR; INTRAVENOUS PRN
Status: DISCONTINUED | OUTPATIENT
Start: 2019-04-25 | End: 2019-04-25

## 2019-04-25 RX ORDER — GLYCOPYRROLATE 0.2 MG/ML
INJECTION, SOLUTION INTRAMUSCULAR; INTRAVENOUS PRN
Status: DISCONTINUED | OUTPATIENT
Start: 2019-04-25 | End: 2019-04-25

## 2019-04-25 RX ORDER — SODIUM CHLORIDE, SODIUM LACTATE, POTASSIUM CHLORIDE, CALCIUM CHLORIDE 600; 310; 30; 20 MG/100ML; MG/100ML; MG/100ML; MG/100ML
INJECTION, SOLUTION INTRAVENOUS CONTINUOUS
Status: DISCONTINUED | OUTPATIENT
Start: 2019-04-25 | End: 2019-04-25 | Stop reason: HOSPADM

## 2019-04-25 RX ORDER — SODIUM CHLORIDE 9 MG/ML
INJECTION, SOLUTION INTRAVENOUS CONTINUOUS PRN
Status: DISCONTINUED | OUTPATIENT
Start: 2019-04-25 | End: 2019-04-25

## 2019-04-25 RX ORDER — EPHEDRINE SULFATE 50 MG/ML
INJECTION, SOLUTION INTRAMUSCULAR; INTRAVENOUS; SUBCUTANEOUS PRN
Status: DISCONTINUED | OUTPATIENT
Start: 2019-04-25 | End: 2019-04-25

## 2019-04-25 RX ORDER — CLINDAMYCIN PHOSPHATE 600 MG/50ML
600 INJECTION, SOLUTION INTRAVENOUS
Status: COMPLETED | OUTPATIENT
Start: 2019-04-25 | End: 2019-04-25

## 2019-04-25 RX ORDER — NALOXONE HYDROCHLORIDE 0.4 MG/ML
.1-.4 INJECTION, SOLUTION INTRAMUSCULAR; INTRAVENOUS; SUBCUTANEOUS
Status: DISCONTINUED | OUTPATIENT
Start: 2019-04-25 | End: 2019-04-26 | Stop reason: HOSPADM

## 2019-04-25 RX ORDER — PROPOFOL 10 MG/ML
INJECTION, EMULSION INTRAVENOUS PRN
Status: DISCONTINUED | OUTPATIENT
Start: 2019-04-25 | End: 2019-04-25

## 2019-04-25 RX ORDER — ONDANSETRON 4 MG/1
4 TABLET, ORALLY DISINTEGRATING ORAL EVERY 30 MIN PRN
Status: DISCONTINUED | OUTPATIENT
Start: 2019-04-25 | End: 2019-04-25 | Stop reason: HOSPADM

## 2019-04-25 RX ORDER — LOSARTAN POTASSIUM 50 MG/1
50 TABLET ORAL DAILY
Status: DISCONTINUED | OUTPATIENT
Start: 2019-04-26 | End: 2019-04-26 | Stop reason: HOSPADM

## 2019-04-25 RX ORDER — LIDOCAINE 40 MG/G
CREAM TOPICAL
Status: DISCONTINUED | OUTPATIENT
Start: 2019-04-25 | End: 2019-04-26 | Stop reason: HOSPADM

## 2019-04-25 RX ORDER — GINSENG 100 MG
CAPSULE ORAL EVERY 8 HOURS
Status: DISCONTINUED | OUTPATIENT
Start: 2019-04-25 | End: 2019-04-26 | Stop reason: HOSPADM

## 2019-04-25 RX ORDER — FENTANYL CITRATE 50 UG/ML
INJECTION, SOLUTION INTRAMUSCULAR; INTRAVENOUS PRN
Status: DISCONTINUED | OUTPATIENT
Start: 2019-04-25 | End: 2019-04-25

## 2019-04-25 RX ORDER — LIDOCAINE HYDROCHLORIDE 20 MG/ML
INJECTION, SOLUTION INFILTRATION; PERINEURAL PRN
Status: DISCONTINUED | OUTPATIENT
Start: 2019-04-25 | End: 2019-04-25

## 2019-04-25 RX ORDER — METOPROLOL TARTRATE 1 MG/ML
1-2 INJECTION, SOLUTION INTRAVENOUS EVERY 5 MIN PRN
Status: DISCONTINUED | OUTPATIENT
Start: 2019-04-25 | End: 2019-04-25 | Stop reason: HOSPADM

## 2019-04-25 RX ORDER — ONDANSETRON 2 MG/ML
INJECTION INTRAMUSCULAR; INTRAVENOUS PRN
Status: DISCONTINUED | OUTPATIENT
Start: 2019-04-25 | End: 2019-04-25

## 2019-04-25 RX ORDER — ONDANSETRON 2 MG/ML
4 INJECTION INTRAMUSCULAR; INTRAVENOUS EVERY 6 HOURS PRN
Status: DISCONTINUED | OUTPATIENT
Start: 2019-04-25 | End: 2019-04-26 | Stop reason: HOSPADM

## 2019-04-25 RX ORDER — FENTANYL CITRATE 50 UG/ML
25-50 INJECTION, SOLUTION INTRAMUSCULAR; INTRAVENOUS
Status: DISCONTINUED | OUTPATIENT
Start: 2019-04-25 | End: 2019-04-25 | Stop reason: HOSPADM

## 2019-04-25 RX ORDER — LIDOCAINE 40 MG/G
CREAM TOPICAL
Status: DISCONTINUED | OUTPATIENT
Start: 2019-04-25 | End: 2019-04-25

## 2019-04-25 RX ORDER — OXYCODONE HYDROCHLORIDE 5 MG/1
5-10 TABLET ORAL EVERY 4 HOURS PRN
Status: DISCONTINUED | OUTPATIENT
Start: 2019-04-25 | End: 2019-04-26 | Stop reason: HOSPADM

## 2019-04-25 RX ORDER — AMOXICILLIN 250 MG
1 CAPSULE ORAL 2 TIMES DAILY
Status: DISCONTINUED | OUTPATIENT
Start: 2019-04-25 | End: 2019-04-26 | Stop reason: HOSPADM

## 2019-04-25 RX ORDER — ACETAMINOPHEN 325 MG/1
325-650 TABLET ORAL EVERY 6 HOURS PRN
Status: DISCONTINUED | OUTPATIENT
Start: 2019-04-25 | End: 2019-04-26 | Stop reason: HOSPADM

## 2019-04-25 RX ORDER — MINERAL OIL/HYDROPHIL PETROLAT
OINTMENT (GRAM) TOPICAL EVERY 8 HOURS
Status: DISCONTINUED | OUTPATIENT
Start: 2019-04-27 | End: 2019-04-26 | Stop reason: HOSPADM

## 2019-04-25 RX ADMIN — GLYCOPYRROLATE 0.2 MG: 0.2 INJECTION, SOLUTION INTRAMUSCULAR; INTRAVENOUS at 07:54

## 2019-04-25 RX ADMIN — PROPOFOL 100 MG: 10 INJECTION, EMULSION INTRAVENOUS at 07:38

## 2019-04-25 RX ADMIN — REMIFENTANIL HYDROCHLORIDE 0.05 MCG/KG/MIN: 1 INJECTION, POWDER, LYOPHILIZED, FOR SOLUTION INTRAVENOUS at 07:37

## 2019-04-25 RX ADMIN — CLINDAMYCIN IN 5 PERCENT DEXTROSE 600 MG: 12 INJECTION, SOLUTION INTRAVENOUS at 08:11

## 2019-04-25 RX ADMIN — SODIUM CHLORIDE, POTASSIUM CHLORIDE, SODIUM LACTATE AND CALCIUM CHLORIDE: 600; 310; 30; 20 INJECTION, SOLUTION INTRAVENOUS at 07:24

## 2019-04-25 RX ADMIN — HYDROMORPHONE HYDROCHLORIDE 0.5 MG: 1 INJECTION, SOLUTION INTRAMUSCULAR; INTRAVENOUS; SUBCUTANEOUS at 12:55

## 2019-04-25 RX ADMIN — SODIUM CHLORIDE: 9 INJECTION, SOLUTION INTRAVENOUS at 07:28

## 2019-04-25 RX ADMIN — LIDOCAINE HYDROCHLORIDE 100 MG: 20 INJECTION, SOLUTION INFILTRATION; PERINEURAL at 07:38

## 2019-04-25 RX ADMIN — ONDANSETRON 4 MG: 2 INJECTION INTRAMUSCULAR; INTRAVENOUS at 12:13

## 2019-04-25 RX ADMIN — BACITRACIN: 500 OINTMENT TOPICAL at 15:37

## 2019-04-25 RX ADMIN — FENTANYL CITRATE 150 MCG: 50 INJECTION, SOLUTION INTRAMUSCULAR; INTRAVENOUS at 07:38

## 2019-04-25 RX ADMIN — DEXAMETHASONE SODIUM PHOSPHATE 10 MG: 10 INJECTION, SOLUTION INTRAMUSCULAR; INTRAVENOUS at 08:00

## 2019-04-25 RX ADMIN — Medication 100 MG: at 07:24

## 2019-04-25 RX ADMIN — OXYCODONE HYDROCHLORIDE 10 MG: 5 TABLET ORAL at 21:10

## 2019-04-25 RX ADMIN — FENTANYL CITRATE 50 MCG: 50 INJECTION, SOLUTION INTRAMUSCULAR; INTRAVENOUS at 12:11

## 2019-04-25 RX ADMIN — OMEPRAZOLE 20 MG: 20 CAPSULE, DELAYED RELEASE ORAL at 15:35

## 2019-04-25 RX ADMIN — Medication 5 MG: at 07:55

## 2019-04-25 RX ADMIN — FENTANYL CITRATE 25 MCG: 50 INJECTION INTRAMUSCULAR; INTRAVENOUS at 13:37

## 2019-04-25 RX ADMIN — ACETAMINOPHEN 650 MG: 325 TABLET, FILM COATED ORAL at 15:35

## 2019-04-25 RX ADMIN — PROPOFOL 40 MG: 10 INJECTION, EMULSION INTRAVENOUS at 08:00

## 2019-04-25 RX ADMIN — FENTANYL CITRATE 25 MCG: 50 INJECTION INTRAMUSCULAR; INTRAVENOUS at 13:18

## 2019-04-25 RX ADMIN — Medication 5 MG: at 08:10

## 2019-04-25 RX ADMIN — PROPOFOL 40 MG: 10 INJECTION, EMULSION INTRAVENOUS at 07:49

## 2019-04-25 RX ADMIN — OXYCODONE HYDROCHLORIDE 10 MG: 5 TABLET ORAL at 15:35

## 2019-04-25 ASSESSMENT — ACTIVITIES OF DAILY LIVING (ADL)
TOILETING: 0-->INDEPENDENT
FALL_HISTORY_WITHIN_LAST_SIX_MONTHS: NO
DRESS: 0-->INDEPENDENT
AMBULATION: 0-->INDEPENDENT
RETIRED_EATING: 0-->INDEPENDENT
TRANSFERRING: 0-->INDEPENDENT
COGNITION: 0 - NO COGNITION ISSUES REPORTED
ADLS_ACUITY_SCORE: 13
SWALLOWING: 0-->SWALLOWS FOODS/LIQUIDS WITHOUT DIFFICULTY
BATHING: 0-->INDEPENDENT
ADLS_ACUITY_SCORE: 13
RETIRED_COMMUNICATION: 0-->UNDERSTANDS/COMMUNICATES WITHOUT DIFFICULTY

## 2019-04-25 ASSESSMENT — MIFFLIN-ST. JEOR: SCORE: 1412

## 2019-04-25 NOTE — ANESTHESIA POSTPROCEDURE EVALUATION
Anesthesia POST Procedure Evaluation    Patient: Price Rene   MRN:     [id number] Gender:   male   Age:    68 year old :      1950        Preoperative Diagnosis: Papillary Thyroid Carcinoma   Procedure(s):  Total Thyroidectomy, Central Neck Dissection  Left Modified Radical Neck Dissection   Postop Comments: No value filed.       Anesthesia Type:  General  No value filed.    Reportable Event: NO     PAIN: Uncomplicated   Sign Out status: Comfortable, Well controlled pain     PONV: No PONV   Sign Out status:  No Nausea or Vomiting     Neuro/Psych: Uneventful perioperative course   Sign Out Status: Preoperative baseline; Age appropriate mentation     Airway/Resp.: Uneventful perioperative course   Sign Out Status: Non labored breathing, age appropriate RR; Resp. Status within EXPECTED Parameters     CV: Uneventful perioperative course   Sign Out status: Appropriate BP and perfusion indices; Appropriate HR/Rhythm     Disposition:   Sign Out in:  PACU  Recovery Course: Uneventful  Follow-Up: Not required           Last Anesthesia Record Vitals:  CRNA VITALS  2019 1231 - 2019 1331      2019             EKG:  Sinus rhythm          Last PACU Vitals:  Vitals Value Taken Time   /86 2019  2:30 PM   Temp 36.7  C (98.1  F) 2019  2:00 PM   Pulse 88 2019  2:30 PM   Resp 22 2019  2:15 PM   SpO2 98 % 2019  2:34 PM   Temp src     NIBP     Pulse     SpO2     Resp     Temp     Ht Rate     Temp 2     Vitals shown include unvalidated device data.      Electronically Signed By: Horacio Phipps MD, 2019, 5:13 PM

## 2019-04-25 NOTE — OR NURSING
Dr. Kulkarni at bedside in PACU. Order to draw stat labs. Only to call if abnormal results. Report given to Kailey LAWRENCE RN.

## 2019-04-25 NOTE — ANESTHESIA PREPROCEDURE EVALUATION
"Anesthesia Pre-Procedure Evaluation    Patient: Price Rene   MRN:     [id number] Gender:   male   Age:    68 year old :      1950        Preoperative Diagnosis: Papillary Thyroid Carcinoma   Procedure(s):  Total Thyroidectomy, Central Neck Dissection  Left Modified Radical Neck Dissection Possible Right     Past Medical History:   Diagnosis Date     HTN (hypertension)      Malignant neoplasm of thyroid gland (H) 3/25/2019      History reviewed. No pertinent surgical history.       Anesthesia Evaluation     .             ROS/MED HX    ENT/Pulmonary:       Neurologic:       Cardiovascular:     (+) hypertension----. : . . . :. .       METS/Exercise Tolerance:     Hematologic:         Musculoskeletal:         GI/Hepatic:     (+) GERD       Renal/Genitourinary:         Endo:     (+) thyroid problem .      Psychiatric:         Infectious Disease:         Malignancy:   (+) Malignancy History of Other  Other CA thyroid status post         Other:                     [facility] AN PHYSICAL EXAM    Lab Results   Component Value Date    WBC 7.5 2019    HGB 16.7 2019    HCT 49.9 2019     (L) 2019    POTASSIUM 4.8 2016    CR 0.64 (L) 2019    GLC 86 2016    AWA 9.6 2019    ALT 23 2016    AST 22 2016    TSH 1.20 2019       Preop Vitals  BP Readings from Last 3 Encounters:   19 (!) 184/92   04/10/19 166/88   19 139/81    Pulse Readings from Last 3 Encounters:   19 60   04/10/19 58   19 86      Resp Readings from Last 3 Encounters:   19 16   19 16    SpO2 Readings from Last 3 Encounters:   19 95%   19 97%   19 96%      Temp Readings from Last 1 Encounters:   19 36.9  C (98.5  F) (Oral)    Ht Readings from Last 1 Encounters:   19 1.6 m (5' 2.99\")      Wt Readings from Last 1 Encounters:   19 75.7 kg (166 lb 14.2 oz)    Estimated body mass index is 29.57 kg/m  as calculated " "from the following:    Height as of this encounter: 1.6 m (5' 2.99\").    Weight as of this encounter: 75.7 kg (166 lb 14.2 oz).     LDA:  Peripheral IV Left Lower forearm (Active)   Number of days:        Peripheral IV Left Foot (Active)   Number of days:        ETT (adult) 7 (Active)   Number of days: 0       Urethral Catheter Latex 16 fr (Active)   Number of days: 0            Assessment:   ASA SCORE: 3    NPO Status: > 6 hours since completed Solid Foods   Documentation: H&P complete; Preop Testing complete; Consents complete   Proceeding: Proceed without further delay  Tobacco Use:  NO Active use of Tobacco/UNKNOWN Tobacco use status     Plan:   Anes. Type:  General   Pre-Induction: Midazolam IV; Acetaminophen PO   Induction:  IV (Standard)   Airway: Oral ETT   Access/Monitoring: PIV   Maintenance: Balanced; Bernabe   Emergence: Procedure Site   Logistics: Same Day Surgery     Postop Pain/Sedation Strategy:  Standard-Options: Opioids PRN     PONV Management:  Adult Risk Factors:, Non-Smoker, Postop Opioids     CONSENT: Direct conversation   Plan and risks discussed with: Patient   Blood Products: Consent Deferred (Minimal Blood Loss)           Medications Prior to Admission   Medication Sig Dispense Refill Last Dose     acetaminophen (TYLENOL) 325 MG tablet Take 325-650 mg by mouth every 6 hours as needed for mild pain   4/17/2019     omeprazole (PRILOSEC) 20 MG DR capsule Take 1 capsule (20 mg) by mouth daily 90 capsule 3 4/20/2019     OMEPRAZOLE PO Take 20 mg by mouth   4/20/2019     telmisartan (MICARDIS) 40 MG tablet Take 1 tablet (40 mg) by mouth daily 90 tablet 3 4/19/2019     UNABLE TO FIND 20 mg MEDICATION NAME: natyonaszim   Unknown at Unknown time     Lab Results   Component Value Date    WBC 7.5 03/14/2019    HGB 16.7 03/14/2019    HCT 49.9 03/14/2019     (L) 03/14/2019    CHOL 216 (H) 08/18/2016    TRIG 270 (H) 08/18/2016    HDL 45 08/18/2016    ALT 23 08/18/2016    AST 22 08/18/2016    TSH 1.20 " 04/08/2019     PAST MEDICAL HISTORY:   Past Medical History:   Diagnosis Date     HTN (hypertension)      Malignant neoplasm of thyroid gland (H) 3/25/2019       PAST SURGICAL HISTORY: History reviewed. No pertinent surgical history.    FAMILY HISTORY:   Family History   Problem Relation Age of Onset     Hypertension Mother      Diabetes Brother      Thyroid Disease No family hx of      Thyroid Cancer No family hx of      Cancer No family hx of        SOCIAL HISTORY:   Social History     Tobacco Use     Smoking status: Former Smoker     Smokeless tobacco: Never Used   Substance Use Topics     Alcohol use: Yes     Alcohol/week: 2.4 oz     Types: 4 Cans of beer per week     Frequency: Monthly or less     Comment: once in awhile   No results found for this or any previous visit (from the past 4320 hour(s)).              Horacio Phipps MD

## 2019-04-26 ENCOUNTER — OFFICE VISIT (OUTPATIENT)
Dept: INTERPRETER SERVICES | Facility: CLINIC | Age: 69
End: 2019-04-26
Payer: MEDICARE

## 2019-04-26 ENCOUNTER — APPOINTMENT (OUTPATIENT)
Dept: PHYSICAL THERAPY | Facility: CLINIC | Age: 69
DRG: 627 | End: 2019-04-26
Attending: OTOLARYNGOLOGY
Payer: MEDICARE

## 2019-04-26 VITALS
HEART RATE: 64 BPM | TEMPERATURE: 98.3 F | BODY MASS INDEX: 29.57 KG/M2 | WEIGHT: 166.89 LBS | OXYGEN SATURATION: 95 % | RESPIRATION RATE: 16 BRPM | DIASTOLIC BLOOD PRESSURE: 78 MMHG | SYSTOLIC BLOOD PRESSURE: 143 MMHG | HEIGHT: 63 IN

## 2019-04-26 LAB
ANION GAP SERPL CALCULATED.3IONS-SCNC: 6 MMOL/L (ref 3–14)
BUN SERPL-MCNC: 18 MG/DL (ref 7–30)
CALCIUM SERPL-MCNC: 7.8 MG/DL (ref 8.5–10.1)
CHLORIDE SERPL-SCNC: 109 MMOL/L (ref 94–109)
CO2 SERPL-SCNC: 22 MMOL/L (ref 20–32)
CREAT SERPL-MCNC: 0.71 MG/DL (ref 0.66–1.25)
ERYTHROCYTE [DISTWIDTH] IN BLOOD BY AUTOMATED COUNT: 13.2 % (ref 10–15)
GFR SERPL CREATININE-BSD FRML MDRD: >90 ML/MIN/{1.73_M2}
GLUCOSE SERPL-MCNC: 118 MG/DL (ref 70–99)
HCT VFR BLD AUTO: 41.4 % (ref 40–53)
HGB BLD-MCNC: 13.7 G/DL (ref 13.3–17.7)
MCH RBC QN AUTO: 30.8 PG (ref 26.5–33)
MCHC RBC AUTO-ENTMCNC: 33.1 G/DL (ref 31.5–36.5)
MCV RBC AUTO: 93 FL (ref 78–100)
PLATELET # BLD AUTO: 141 10E9/L (ref 150–450)
POTASSIUM SERPL-SCNC: 4.1 MMOL/L (ref 3.4–5.3)
RBC # BLD AUTO: 4.45 10E12/L (ref 4.4–5.9)
SODIUM SERPL-SCNC: 138 MMOL/L (ref 133–144)
WBC # BLD AUTO: 9.5 10E9/L (ref 4–11)

## 2019-04-26 PROCEDURE — 25000132 ZZH RX MED GY IP 250 OP 250 PS 637: Performed by: STUDENT IN AN ORGANIZED HEALTH CARE EDUCATION/TRAINING PROGRAM

## 2019-04-26 PROCEDURE — T1013 SIGN LANG/ORAL INTERPRETER: HCPCS | Mod: U3

## 2019-04-26 PROCEDURE — 25000132 ZZH RX MED GY IP 250 OP 250 PS 637: Performed by: OTOLARYNGOLOGY

## 2019-04-26 PROCEDURE — 25000128 H RX IP 250 OP 636: Performed by: PHYSICIAN ASSISTANT

## 2019-04-26 PROCEDURE — 25000132 ZZH RX MED GY IP 250 OP 250 PS 637: Performed by: PHYSICIAN ASSISTANT

## 2019-04-26 PROCEDURE — A9270 NON-COVERED ITEM OR SERVICE: HCPCS | Performed by: STUDENT IN AN ORGANIZED HEALTH CARE EDUCATION/TRAINING PROGRAM

## 2019-04-26 PROCEDURE — 85027 COMPLETE CBC AUTOMATED: CPT | Performed by: OTOLARYNGOLOGY

## 2019-04-26 PROCEDURE — 97530 THERAPEUTIC ACTIVITIES: CPT | Mod: GP

## 2019-04-26 PROCEDURE — 25000128 H RX IP 250 OP 636: Performed by: STUDENT IN AN ORGANIZED HEALTH CARE EDUCATION/TRAINING PROGRAM

## 2019-04-26 PROCEDURE — A9270 NON-COVERED ITEM OR SERVICE: HCPCS | Performed by: OTOLARYNGOLOGY

## 2019-04-26 PROCEDURE — A9270 NON-COVERED ITEM OR SERVICE: HCPCS | Performed by: PHYSICIAN ASSISTANT

## 2019-04-26 PROCEDURE — 97116 GAIT TRAINING THERAPY: CPT | Mod: GP

## 2019-04-26 PROCEDURE — 36415 COLL VENOUS BLD VENIPUNCTURE: CPT | Performed by: OTOLARYNGOLOGY

## 2019-04-26 PROCEDURE — 80048 BASIC METABOLIC PNL TOTAL CA: CPT | Performed by: OTOLARYNGOLOGY

## 2019-04-26 PROCEDURE — 97162 PT EVAL MOD COMPLEX 30 MIN: CPT | Mod: GP

## 2019-04-26 PROCEDURE — 97110 THERAPEUTIC EXERCISES: CPT | Mod: GP

## 2019-04-26 PROCEDURE — 25000125 ZZHC RX 250: Performed by: PHYSICIAN ASSISTANT

## 2019-04-26 RX ORDER — MINERAL OIL/HYDROPHIL PETROLAT
OINTMENT (GRAM) TOPICAL EVERY 8 HOURS
Qty: 50 G | Refills: 1 | Status: SHIPPED | OUTPATIENT
Start: 2019-04-27

## 2019-04-26 RX ORDER — CALCIUM CARBONATE 500 MG/1
2 TABLET, CHEWABLE ORAL 3 TIMES DAILY
Qty: 84 TABLET | Refills: 1 | Status: SHIPPED | OUTPATIENT
Start: 2019-04-26 | End: 2019-05-08

## 2019-04-26 RX ORDER — AMOXICILLIN 250 MG
1 CAPSULE ORAL 2 TIMES DAILY
Qty: 30 TABLET | Refills: 0 | Status: SHIPPED | OUTPATIENT
Start: 2019-04-26 | End: 2020-04-08

## 2019-04-26 RX ORDER — LEVOTHYROXINE SODIUM 125 UG/1
125 TABLET ORAL DAILY
Qty: 30 TABLET | Refills: 1 | Status: SHIPPED | OUTPATIENT
Start: 2019-04-26 | End: 2019-05-13

## 2019-04-26 RX ORDER — OXYCODONE HYDROCHLORIDE 5 MG/1
5-10 TABLET ORAL EVERY 4 HOURS PRN
Qty: 30 TABLET | Refills: 0 | Status: SHIPPED | OUTPATIENT
Start: 2019-04-26 | End: 2019-05-08

## 2019-04-26 RX ORDER — CALCITRIOL 0.25 UG/1
0.25 CAPSULE, LIQUID FILLED ORAL 2 TIMES DAILY
Qty: 28 CAPSULE | Refills: 0 | Status: SHIPPED | OUTPATIENT
Start: 2019-04-26 | End: 2019-06-05

## 2019-04-26 RX ADMIN — OXYCODONE HYDROCHLORIDE 10 MG: 5 TABLET ORAL at 03:51

## 2019-04-26 RX ADMIN — OMEPRAZOLE 20 MG: 20 CAPSULE, DELAYED RELEASE ORAL at 08:24

## 2019-04-26 RX ADMIN — BACITRACIN: 500 OINTMENT TOPICAL at 03:53

## 2019-04-26 RX ADMIN — SENNOSIDES AND DOCUSATE SODIUM 1 TABLET: 8.6; 5 TABLET ORAL at 08:24

## 2019-04-26 RX ADMIN — PHENYLEPHRINE HYDROCHLORIDE 5 ML: 10 INJECTION INTRAVENOUS at 10:00

## 2019-04-26 RX ADMIN — LOSARTAN POTASSIUM 50 MG: 50 TABLET, FILM COATED ORAL at 08:24

## 2019-04-26 RX ADMIN — BACITRACIN: 500 OINTMENT TOPICAL at 08:30

## 2019-04-26 RX ADMIN — ENOXAPARIN SODIUM 40 MG: 40 INJECTION SUBCUTANEOUS at 08:10

## 2019-04-26 RX ADMIN — LEVOTHYROXINE SODIUM 125 MCG: 25 TABLET ORAL at 16:06

## 2019-04-26 ASSESSMENT — ACTIVITIES OF DAILY LIVING (ADL)
ADLS_ACUITY_SCORE: 10

## 2019-04-26 NOTE — OP NOTE
Procedure Date: 04/25/2019      PREOPERATIVE DIAGNOSIS:  Papillary thyroid carcinoma.      POSTOPERATIVE DIAGNOSIS:  Papillary thyroid carcinoma.      PROCEDURES PERFORMED:     1.  Total thyroidectomy.   2.  Left central neck dissection.   3.  Left modified radical neck dissection, levels 2, 3 and 4.   4.  Right upper parathyroid autotransplant to the left sternocleidomastoid muscle.      ATTENDING SURGEON:  Yunier Rodriguez MD.      ASSISTANT SURGEON:  Dipesh Kulkarni MD.      ANESTHESIA:  General.      ESTIMATED BLOOD LOSS:  50 mL.      INDICATIONS:  Mr. Roland is a 68-year-old male who presented with a left level 3 neck mass that was biopsy positive for papillary thyroid carcinoma.  He had an ultrasound and CT of the neck that showed 2 subcentimeter thyroid nodules in the left thyroid and no additional neck disease noted.  After a detailed discussion of risks, benefits and alternatives, he elected to proceed with the above-listed procedure.      INTRAOPERATIVE FINDINGS:   1.  Large 3 cm lymphadenopathy left neck level 3.  Multiple small lymphadenopathies at the central neck compartment.   2.  The recurrently laryngeal nerve on both sides were identified and protected during the procedure.  The left superior parathyroid and right superior parathyroid were identified.  The right superior parathyroid was detached, therefore reimplanted to the left sternocleidomastoid muscle and labeled with 3 medium surgical clips.      DESCRIPTION OF PROCEDURE:  After properly identifying the patient and obtaining signed informed consent, the patient was transferred to the operative room.  General anesthesia was induced and patient was orotracheally intubated by Anesthesia staff with a NIM nerve monitoring tube.  The patient was placed in a supine position and the table was turned 180 degrees to facilitate the procedure.  A shoulder roll was placed to extend the patient's neck.  The Zawatt nerve monitoring system was connected and a  tap test was performed to confirm correct functioning.  The patient's neck was then prepped and draped in the usual sterile fashion.  A curved linear incision along the natural neck skin crease was designed on the patient's left neck.  1% lidocaine with 1:100,000 epinephrine was injected to the planned incision site.  The incision was made with a #15 blade.  Subplatysmal flap was raised superior to the level of the hyoid bone and inferior to the level of the clavicle.  The strap muscles were identified and divided at the midline rhaphe.  Attention was then paid to the left thyroid gland.  The strap muscles were dissected off the thyroid gland from a medial to lateral direction.  The superior pole of the thyroid gland was identified.  The superior pedicles were isolated and divided.  The superior laryngeal nerve was identified and protected.  The inferior pole of the thyroid gland was also identified and divided from the inferior pedicle.  The thyroid gland was then medialized and dissected from a lateral to medial direction.  The left recurrent laryngeal nerve was identified and isolated along its course.  The left thyroid gland was then dissected away from the trachea.  The attention was then turned to the right thyroid gland and a similar procedure was performed.  The right recurrent laryngeal nerve was also identified and protected.  The entire thyroid gland was then removed and sent for pathology.  Inspection of the wound bed showed a healthy left superior parathyroid gland and a devitalized right superior parathyroid gland.  A sample of the right parathyroid gland was sent for frozen pathology for confirmation and placed at the back table for later autotransplant.      We then proceeded with left central neck dissection.  The left recurrent laryngeal nerve was traced down to the left neck level 6 and 7.  The fibrofatty tissue at the left neck level 6 and 7 was carefully dissected with the medial border lined by  the trachea, inferior border lined by the innominate artery and lateral border lined by the carotid artery.  There were multiple small lymphadenopathies noted in the central compartment. We then proceeded with the left neck dissection level 2 through 4.  The superficial layer of the deep cervical fascia was opened along the anterior border of the sternocleidomastoid, the anterior border of the strap muscles and the inferior border of the submandibular gland.  The posterior belly of the digastric muscle was identified and skeletonized.  The omohyoid muscle was also identified and skeletonized.  The lymphofatty tissues at left neck level 2-4 was then dissected from a lateral to medial direction, with the superior border lined by the digastric muscle, lateral border lined by the sternocleidomastoid muscle, inferior border lined by the clavicle, medial border lined by the strap muscles and posterior border lined by the cervical floor and rootlets.  The cranial nerve XI and cranial nerve XII were identified and protected during the neck dissection.  There was a 3 cm large lymph node at left level 3.  This was incorporated into the neck dissection specimen. There was a small chyle leak at left level 4, this was controlled by multiple surgical clippings. Valsalva maneuver was performed and confirm excellent control of the chyle leak.     The wound bed was then irrigated with copious amount of normal saline and suctioned.  Hemostasis was achieved with bipolar cautery.  The previously saved right superior parathyroid gland was then minced and embedded to the left sternocleidomastoid muscle.  The area of autotransplant was marked by 3 medium surgical clips.  Two ADEEL drains were then placed with the lateral drain in the lateral gutter and the medial drain in and the medial neck compartment.  The strap muscles were then reapproximated with 3-0 Vicryl suture.  The platysma muscles were reapproximated with 3-0 Vicryl sutures.  The  skin was closed with 4-0 nylon in a simple running fashion.  This concluded the procedure.  The patient was then turned back to Anesthesia staff and extubated without difficulties.  Dr. Valdez was present during the entire procedure.         SPECIMENS:   1.  Total thyroidectomy and pyramidal lobe.   2.  Left central neck dissection.   3.  Left neck level 2B.   4.  Left neck level 3 and 4.   5.  Left neck level 2A.      DISPOSITION:  The patient was transferred to PACU in stable condition.       I was present for the entire procedure  Yunier VALDEZ MD       As dictated by ROSA ISELA AYALA MD            D: 2019   T: 2019   MT: RACHEL      Name:     AAKASH BUCIO   MRN:      [id number]        Account:        [id number]   :      1950           Procedure Date: 2019      Document: [id number]

## 2019-04-26 NOTE — PLAN OF CARE
Discharge Planner PT   Patient plan for discharge: Home with assist  Current status: IND with bed mobility, transfers. Ambulates with 'x2. Ascends/descends 3 steps SBAx2 with SBA. Shoulder AROM WNL bilaterally.   Barriers to return to prior living situation: medical status, line/drain management.   Recommendations for discharge: Home with assist. OP PT.  Rationale for recommendations: Pt is able to perform functional mobility necessary for safe return home. Pt reports 24 hour assistance at home if needed. Pt instructed to seek OP PT if pain or ROM deficits are noticed as surgery heals.     PT to ambulate in hallway 3-5 times per day with family/nursing with SBA and GB       Entered by: Shaquille Hunt 04/26/2019 11:11 AM

## 2019-04-26 NOTE — PLAN OF CARE
Pt educated on wound cares and emptying ADEEL drains. Completed education on discharge meds. Pt stable and adequate for discharge. Sent home w/ supplies and personal belongings, family accompanied patient.

## 2019-04-26 NOTE — PROGRESS NOTES
Pt POD #1 for Total Thyroidectomy, Central Neck Dissection Left Modified Radical Neck Dissection. Pt Citizen of [place] speaking as primary language, live person  used from 3-6pm. Pt A&Ox4. Denies N/T. 5/5 all extremities. Pt tolerating full liquid diet. Advance as tolerated to Regular. Pt denied wanting food multiple times. Denies nausea and headaches. Pain in L neck region, given Oxy and Tylenol x1. Pt had pain relief. NS @ 100 mL/hr. 2 L O2, stating high 90's, capno. 2 L neck ADEEL's to bulb suction. Bright red/bloody drainage. L neck incision, TIFFANIE, scant bloody drainage, NS cares and bacitracin applied x1. Pt due to void at 9:30 pm. Family at bedside, involved and supportive with cares. RN gave SBAR report to night shift nurse taking over. Continue to monitor and follow POC.

## 2019-04-26 NOTE — PROGRESS NOTES
04/26/19 0900   Quick Adds   Quick Adds Student Supervision-Eval Only   Type of Visit Initial PT Evaluation       Present no  (SPT speaks Chinese)   Living Environment   Lives With child(jerald), adult  (Son and son's spouse. Son's spouse at home during day)   Living Arrangements mobile home   Home Accessibility stairs to enter home   Number of Stairs, Main Entrance 3   Stair Railings, Main Entrance railing on right side (ascending)   Transportation Anticipated family or friend will provide   Living Environment Comment family avaliable 24hrs for A   Self-Care   Usual Activity Tolerance good   Current Activity Tolerance moderate   Regular Exercise Other (see comments)  (pt takes walks daily for exercise)   Equipment Currently Used at Home none   Functional Level Prior   Ambulation 0-->independent   Transferring 0-->independent   Toileting 0-->independent   Bathing 0-->independent   Communication 0-->understands/communicates without difficulty   Fall history within last six months yes  (pt fell on ice 1 month prior and bruised R hip. )   Number of times patient has fallen within last six months 1   Prior Functional Level Comment IND with all ADLs prior   General Information   Onset of Illness/Injury or Date of Surgery - Date 04/25/19   Referring Physician Yunier Rodriguez MD   Patient/Family Goals Statement To return home    Pertinent History of Current Problem (include personal factors and/or comorbidities that impact the POC) Price Rene is a 68 year old male with a past medical history of a level 3 lymph node biopsy positive for papillary thyroid carcinoma and a 2 cm left thyroid nodule s/p total thyroidectomy, central neck dissection, modified radical neck dissection levels 2-4 and right upper parathyroid autotransplant into left SCM.   Weight-Bearing Status - LUE other (see comments)  (Pt stated he was told to avoid lifting >15lbs)   General Observations Pt agreeable to  "physical therapy. Motivated to return home. Denies pain.    General Info Comments Needs assistance with drain/line management. Light headedness with standing after supine.    Cognitive Status Examination   Orientation orientation to person, place and time   Level of Consciousness alert   Follows Commands and Answers Questions 100% of the time   Personal Safety and Judgment intact   Memory intact   Pain Assessment   Patient Currently in Pain No   Posture    Posture Protracted shoulders;Forward head position;Kyphosis   Strength   Strength Comments WNL   Bed Mobility   Bed Mobility Comments IND   Transfer Skills   Transfer Comments IND   Gait   Gait Comments Decreased gait speed, low foot clearance in swinng stance, forward trunk lean   Balance   Balance Comments no LOB during session   Sensory Examination   Sensory Perception Comments WFL   Coordination   Coordination Comments WFL   General Therapy Interventions   Planned Therapy Interventions ADL retraining;neuromuscular re-education;ROM;strengthening;stretching;risk factor education;home program guidelines;progressive activity/exercise   Clinical Impression   Criteria for Skilled Therapeutic Intervention yes, treatment indicated   PT Diagnosis Impaired mobility   Influenced by the following impairments Pain with movement of the neck   Functional limitations due to impairments Lifting objects with L arm   Clinical Presentation Evolving/Changing   Clinical Presentation Rationale clinical reasoning   Clinical Decision Making (Complexity) Low complexity   Therapy Frequency` 3 times/week   Predicted Duration of Therapy Intervention (days/wks) 1 week   Anticipated Discharge Disposition Home with Assist   Risk & Benefits of therapy have been explained Yes   Patient, Family & other staff in agreement with plan of care Yes   Clinical Impression Comments Pt may benefit from OP PT as insicion heals if he notices pain or AROM deficits continue.    [facility] AM-PAC TM \"6 " "Clicks\"   2016, Trustees of [facility], under license to [employer].  All rights reserved.   6 Clicks Short Forms Basic Mobility Inpatient Short Form   [facility] AM-PAC  \"6 Clicks\" V.2 Basic Mobility Inpatient Short Form   1. Turning from your back to your side while in a flat bed without using bedrails? 4 - None   2. Moving from lying on your back to sitting on the side of a flat bed without using bedrails? 4 - None   3. Moving to and from a bed to a chair (including a wheelchair)? 4 - None   4. Standing up from a chair using your arms (e.g., wheelchair, or bedside chair)? 4 - None   5. To walk in hospital room? 4 - None   6. Climbing 3-5 steps with a railing? 4 - None   Basic Mobility Raw Score (Score out of 24.Lower scores equate to lower levels of function) 24   Total Evaluation Time   Total Evaluation Time (Minutes) 15     "

## 2019-04-26 NOTE — PLAN OF CARE
Shift: [id number]    Neuro: A&O x4, neuro intact  Cardiac: VSS  Respiratory: RA  GI/: Voiding spontaneously.  Diet/Appetite: Advanced to reg diet. Pt ate small amount of soup and crackers. Denies nausea   Activity: Up with SBA  Pain: Controlled with Oxy x2  Skin: L neck incision WDL TIFFANIE  Lines: PIV SL  Drains: 2 L JPs with large amount of sanguinous drainage    Patient Swedish speaker.  scheduled to be here at 0815. Daughter at bedside overnight. Patient has been resting comfortably through night. Will continue to monitor and follow POC.

## 2019-04-26 NOTE — PLAN OF CARE
POD #1 s/p total thyroidectomy, central neck dissection, modified radical neck dissection levels 2-4 and right upper parathyroid autotransplant into left SCM. Reg diet. Voiding spnt; bladder scanned for low PVR. One BM today. L neck inc intact with sutures; cared for as ordered. ADEEL x2 with mod amt serosang drainage; switched to small bulbs. Tylenol effective for pain mgmt. Pt is German speaking only; will discharge today- waiting for  to arrive to go over discharge paperwork with pt and daughter Kisha. Pt will lives with two daughters who will care for him. ADEEL and incision education completed with pt and his daughter Kisha; she feels confident she can care for JPs and incision. Continue with POC.

## 2019-04-26 NOTE — PROGRESS NOTES
"Otolaryngology Progress Note  April 26, 2019    S: No acute events overnight. Afebrile and vitally stable. 1.5 LPM overnight. Tolerating a small amount of food. Pain well controlled on PO medications     O: /78 (BP Location: Right arm)   Pulse 64   Temp 98.3  F (36.8  C) (Oral)   Resp 16   Ht 1.6 m (5' 2.99\")   Wt 75.7 kg (166 lb 14.2 oz)   SpO2 95%   BMI 29.57 kg/m     General: Alert and oriented x 3, No acute distress   HEENT: Neck incisions c/d/i, neck soft and flat, ADEEL in place x2 draining serosanguinous fluid, voice strong but hoarse this morning, strong cough     Neuro: Alert and oriented, facial movement full and intact     Pulmonary: Breathing non-labored, no stridor, no accessory muscle use.      Intake/Output Summary (Last 24 hours) at 4/26/2019 0901  Last data filed at 4/26/2019 0640  Gross per 24 hour   Intake 900 ml   Output 510 ml   Net 390 ml     ADEEL drain output(s): (last 24 hours)/(last shift)  1: 20/60/40  2: 25/30/40    LABS:  ROUTINE IP LABS (Last four results)  BMP  Recent Labs   Lab 04/26/19 0628 04/25/19  1657 04/25/19  1346     --   --    POTASSIUM 4.1  --   --    CHLORIDE 109  --   --    AWA 7.8*  --  8.4*   CO2 22  --   --    BUN 18  --   --    CR 0.71 0.76  --    *  --   --      CBC  Recent Labs   Lab 04/26/19 0628 04/25/19  1657   WBC 9.5  --    RBC 4.45  --    HGB 13.7  --    HCT 41.4  --    MCV 93  --    MCH 30.8  --    MCHC 33.1  --    RDW 13.2  --    * 133*     INRNo lab results found in last 7 days.    A/P: Price Rene is a 68 year old male with a past medical history of a level 3 lymph node biopsy positive for papillary thyroid carcinoma and a 2 cm left thyroid nodule s/p total thyroidectomy, central neck dissection, modified radical neck dissection levels 2-4 and right upper parathyroid autotransplant into left SCM.      Neuro:  - Pain control: PRN tylenol and oxycodone   - PT for shoulder exercises   - Activity: up ad augusto   HEENT:  - " Incision cares: clean with 0.9% sodium chloride and apply Aquaphor Q8H   - Monitor and record ADEEL drain output Qshift  - Bacitracin q8hr to incision for first 24 hrs than transition to aquaphor q8hr   Respiratory:  - supplemental O2 PRN to keep sats >92%  CV/heme:  - hemodynamically stable  - Losartan   FEN/GI:  - PTA omeprazole   - Regular diet   - Bowel regimen: Senna-docusate   :  - voiding independently  Endo  - Glucose checks   ID:  - Afebrile   PPX:  - Lovenox   - SCDs  - IS  Dispo: Pending PT, PO intake and pain control     -- Patient and above plan discussed with Dr. Jennifer Dmuas PGY1  Otolaryngology-Head & Neck Surgery

## 2019-04-27 ENCOUNTER — PATIENT OUTREACH (OUTPATIENT)
Dept: CARE COORDINATION | Facility: CLINIC | Age: 69
End: 2019-04-27

## 2019-04-29 ENCOUNTER — ALLIED HEALTH/NURSE VISIT (OUTPATIENT)
Dept: OTOLARYNGOLOGY | Facility: CLINIC | Age: 69
End: 2019-04-29
Payer: MEDICARE

## 2019-04-29 ENCOUNTER — PATIENT OUTREACH (OUTPATIENT)
Dept: OTOLARYNGOLOGY | Facility: CLINIC | Age: 69
End: 2019-04-29

## 2019-04-29 DIAGNOSIS — Z48.03 CHANGE OR REMOVAL OF DRAINS: Primary | ICD-10-CM

## 2019-04-29 PROBLEM — R89.6 ABNORMAL CYTOLOGY: Status: ACTIVE | Noted: 2019-04-29

## 2019-04-29 LAB — COPATH REPORT: NORMAL

## 2019-04-29 NOTE — PROGRESS NOTES
Called and left message for patient to check in to see how he is doing following discharge and to check on status of ADEEL drains and plan for removal.     Left direct line and encouraged patient/family to return call.     Calli Turner, RN, BSN

## 2019-04-29 NOTE — PROGRESS NOTES
Is patient coming from ER or being seen in a peds clinic? No - use LOS [id number]    Price Rene comes into clinic today at the request of Dr. Rodriguez Ordering Provider for ADEEL drain removal     Patient in clinic today for ADEEL drain removal. Drain output in last 24 hours was 15 ml, indicating ADEEL removal as it was less than 30 ml in 24 hours. Incision site evaluated and it was clean, dry and intact without evidence of redness, swelling or drainage. ADEEL site was cleansed, suture around tubing was removed, drain bulb was opened, and drain was removed easily. Patient educated on signs and symptoms of infection, site care and provided number to call with further questions or concerns. Patient verbalized understanding and was encouraged to call with any further questions or concerns    2nd ADEEL drain not ready to be removed yet as output still greater than 30ml in 24 hours. RN will check in with patient on Wednesday regarding output and arrange for removal when appropriate.       Calli Turner, RN, BSN

## 2019-05-01 ENCOUNTER — ALLIED HEALTH/NURSE VISIT (OUTPATIENT)
Dept: OTOLARYNGOLOGY | Facility: CLINIC | Age: 69
End: 2019-05-01
Payer: MEDICARE

## 2019-05-01 DIAGNOSIS — Z48.03 CHANGE OR REMOVAL OF DRAINS: Primary | ICD-10-CM

## 2019-05-01 NOTE — NURSING NOTE
Pt here for ADEEL drain removal. Advised pt that we do not do this in our lab. Apologized that they had been scheduled incorrectly. Spoke to Marques in 4th floor ENT clinic. He advised to send pt to their clinic and they will have one of their RN's assess. Advised pt of the above.     Chely BUTLER RN PHN BSN  BMT/Oncology Lab

## 2019-05-01 NOTE — NURSING NOTE
Patient came in today for ADEEL drain removal.  He reports the output has been less than 15 ml within the past 24 hours.    Incision is dry, clean, and intact. No signs or symptoms of infection noted at this time.    Patient reports tenderness below chin and neck area that occurred after first drainage was removed.   Patient denied any fever or chills at this time.  Patient reports sleeping with head of the bed elevated.  I informed patient that Dr. Rodriguez is out of the office and returning Friday.    I will message him to see if it's something out of the ordinary as I do not visually see anything concerning.  Patient was able to turn neck side to side and up and down without any unusual difficulties.  No unusual swelling noted at this time.    ADEEL suture around tubing was removed, drain bulb released, and drain was removed intact and without any complications.  Re-informed signs and symptoms of infection, site care and provided number to call with further questions or concerns. Patient verbalized understanding and was encouraged to call with any further questions or concerns      Will send message to ABDIAS Mccurdy to contact patient to schedule a nurse visit to remove sutures per patient's request as they've have been having scheduling confusions.

## 2019-05-01 NOTE — PATIENT INSTRUCTIONS
Thank you for choosing [facility].  Your ADEEL drain was removed today.    Please leave dressing on for at least 24 hours.  May shower after you remove the bandage but don't take a tub bath or submerge the area in water until your incision is completely closed and there is no drainage.  Wash the area gently with soap and rinse the area with warm water. Pat the area dry.     Call if you note bright red drainage, fever, tenderness, swelling, chills, or skin is hot to the touch around the surgical site.

## 2019-05-03 ENCOUNTER — TELEPHONE (OUTPATIENT)
Dept: OTOLARYNGOLOGY | Facility: CLINIC | Age: 69
End: 2019-05-03

## 2019-05-06 ENCOUNTER — TELEPHONE (OUTPATIENT)
Dept: OTOLARYNGOLOGY | Facility: CLINIC | Age: 69
End: 2019-05-06

## 2019-05-06 NOTE — TELEPHONE ENCOUNTER
Called and spoke with patient's son regarding suture removal. Patient will return to clinic for nurse visit tomorrow 5/7 at 3:00pm.     Calli Turner RN, BSN

## 2019-05-07 ENCOUNTER — ALLIED HEALTH/NURSE VISIT (OUTPATIENT)
Dept: OTOLARYNGOLOGY | Facility: CLINIC | Age: 69
End: 2019-05-07
Payer: MEDICARE

## 2019-05-07 DIAGNOSIS — C73 MALIGNANT NEOPLASM OF THYROID GLAND (H): Primary | ICD-10-CM

## 2019-05-07 NOTE — PROGRESS NOTES
"Patient was alert and oriented x3.  His son and an  were present.  Patient does mention some tenderness to left neck area.    He stated that he was not informed about what Dr. Rodriguez recommended; therefore, I informed him: \"tenderness is okay, as long as there isn't significant swelling in the area.\"     No significant swelling noted at this time.    FOV Dr. Rodriguez 05/13/2019  Patient will continue to monitor and call if symptoms worsens.    Wound is dry, clean, and intact.  No signs or symptoms of infection noted at this time.  I explained the procedure to the patient.  Sutures were removed without any complications.  Patient tolerated procedure well.  Thin layer of bacitracin ointment was applied wound.    We discussed about wound care.  No questions or concerns noted at this time.  "

## 2019-05-08 ENCOUNTER — OFFICE VISIT (OUTPATIENT)
Dept: ENDOCRINOLOGY | Facility: CLINIC | Age: 69
End: 2019-05-08
Payer: MEDICARE

## 2019-05-08 VITALS
HEART RATE: 54 BPM | DIASTOLIC BLOOD PRESSURE: 76 MMHG | SYSTOLIC BLOOD PRESSURE: 147 MMHG | WEIGHT: 169.5 LBS | HEIGHT: 65 IN | BODY MASS INDEX: 28.24 KG/M2

## 2019-05-08 DIAGNOSIS — E89.0 POST-SURGICAL HYPOTHYROIDISM: ICD-10-CM

## 2019-05-08 DIAGNOSIS — C73 PAPILLARY THYROID CARCINOMA (H): ICD-10-CM

## 2019-05-08 DIAGNOSIS — C73 PAPILLARY THYROID CARCINOMA (H): Primary | ICD-10-CM

## 2019-05-08 DIAGNOSIS — E83.51 HYPOCALCEMIA: ICD-10-CM

## 2019-05-08 PROBLEM — R89.6 ABNORMAL CYTOLOGY: Status: RESOLVED | Noted: 2019-04-29 | Resolved: 2019-05-08

## 2019-05-08 PROBLEM — R22.1 MASS OF LEFT SIDE OF NECK: Status: RESOLVED | Noted: 2019-03-25 | Resolved: 2019-05-08

## 2019-05-08 LAB
CALCIUM SERPL-MCNC: 8.9 MG/DL (ref 8.5–10.1)
CREAT SERPL-MCNC: 0.65 MG/DL (ref 0.66–1.25)
GFR SERPL CREATININE-BSD FRML MDRD: >90 ML/MIN/{1.73_M2}
PHOSPHATE SERPL-MCNC: 3.3 MG/DL (ref 2.5–4.5)
TSH SERPL DL<=0.005 MIU/L-ACNC: 2.88 MU/L (ref 0.4–4)

## 2019-05-08 RX ORDER — CALCIUM CARBONATE 500 MG/1
TABLET, CHEWABLE ORAL
Qty: 84 TABLET | Refills: 0 | COMMUNITY
Start: 2019-05-08 | End: 2019-06-07

## 2019-05-08 ASSESSMENT — PAIN SCALES - GENERAL: PAINLEVEL: NO PAIN (0)

## 2019-05-08 ASSESSMENT — MIFFLIN-ST. JEOR: SCORE: 1447.79

## 2019-05-08 NOTE — ASSESSMENT & PLAN NOTE
Treat to target TSH < 0.4 due to the thyroid cancer.    I expect he will need LT4 150 mcg/day. He is currently on 125 mcg/day.     We will call him and tell him to change the LT4 to 125 * 8/week in divided dose (mean 143 mcg/day) until current supply is gone and then increase to 150 mcg/day after that.

## 2019-05-08 NOTE — PROGRESS NOTES
Endocrine Consult note    Attending Assessment/Plan :     Papillary thyroid carcinoma (H)  mulfifocal, bilateral, subcm primary (4 foci, TTD not possible from path report data),17 LN + with bulky size (up to 4.4 cm) and TOR.  He has been treated with total Tx, CND and left LND.   pT1a, pN1b, pMx, cMx, stage 2 minimum  MACIS 5.72 assuming complete resection and no distant mets  HOWARD recurrence risk HIGH due to # of involved LN, size of involved LN, TOR  I expect we will eventually give adjuvant 131I but we need to be sure he has cleared the iodine contamination first.         Post-surgical hypothyroidism  Treat to target TSH < 0.4 due to the thyroid cancer.    I expect he will need LT4 150 mcg/day. He is currently on 125 mcg/day.     We will call him and tell him to change the LT4 to 125 * 8/week in divided dose (mean 143 mcg/day) until current supply is gone and then increase to 150 mcg/day after that.            Hypocalcemia  Was noted post op but with PTH 28.  I expect this will be temporary.  Labs today following the appt confirm normal calcium on calcitriol 0.25 mcg bid and Ca tid. We will call and tell him OK to stop calcitriol.  Continue calcium for now with meals.      Iodine contrast exposure 4/10/19  spot urine iodine following the appt is 261 mcg/g creatinine- higher than target but not bad for one month out from exposure. We definitely should hold on 131I at least another month.      Yanira Uribe MD      Chief complaint/ HISTORY OF PRESENT ILLNESS  Price presents today, along with his daughter, for follow up of thyroid cancer, newly diagnosed. I have seen him once before, on 4/10/19.  He was seen along with the  bot.     Left neck mass FNAB was read as positive for papillary thyroid carcinoma prior to our lst appt.  Imaging did not clearly show  a thyroid primary .  Since our last appt he has had surgery, as follows:  4/25/19 total thyroidectomy, left central and modified radical  neck dissection levels 2-4.  Right upper parathyroid autotx into left SCM (Dr Yunier Rodriguez).  I have reviewed operative and surgical pathology reports.  The pathology showed papillary thyroid carcinoma, bilateral, largest 0.4 cm left (total tumor diameter not possible from path report), + LN 17/55 (12/26 CND, largest tumor focus 0.4 cm) with + TOR, largest tumor met 4.4 cm left level 2A, but tumor also in level 3 and 4 LNs  He says the operation went fine. He was hospitalized 2 days.  Since the drain was removed the level 1 neck is more prominent.      We have the following labs  4/8/19: Tg 8.1, HOWARD < 0.4,  TSH 1.2  4/25/19 Ca 8.4, PTH 28  4/26/19 Ca 7.8, creatinine 0.71  5/8/19 following appt TSH 2.88, Ca 8.9, phos 3.3, creatinine 0.65    I have reviewed images on PACS  3/14/19 CXR negative  3/20/19 FNAB left neck cystic mass 3.2 x 2.5 x 4.3 - it has one small papillary projection which was where the needle sampled.    4/10/19 thyroid and neck US:   Left superior # 1 0.3  X0.2  X 0.3   Left inferior # 2 0.4 x 0.2  X 0.3 cm   Right level 3 0.8 x0.4 x 1.4 Echogenic hilum  Left level 3 # 1 0.6 x 0.3 x 0.7 cm   Left level 5  Cystic mass 3.2 x 2.3 x 4.1 cm - internal mural area highly suspicious with echogenic foci 1.1 x 1 x 1 cm  4/10/19 CT neck with contrast:   Confirms the US to my eye.    He is not aware of radiation exposure as a child.  [place] -there were a lot of chemicals there.     REVIEW OF SYSTEMS  Some pain left level 2 stitch area  No fever  Weight stable   Voice is OK  Sleep at night better since less pain -  Choking on water a little bit - hurts a bit   No regurgitation   Sleeping reclining but at approx 45 degere angle; using a lot of pillows    Something is stuck in there- he points to sternal notch- feels only when reclining   Cardiac: negative  Respiratory: SOB only when reclining - cant' swallow saliva   GI: negative  No paresthesias  No cramps    Past Medical History  Past  "Medical History:   Diagnosis Date     HTN (hypertension)      Malignant neoplasm of thyroid gland (H) 3/25/2019     Medications    Current Outpatient Medications   Medication Sig Dispense Refill     acetaminophen (TYLENOL) 325 MG tablet Take 325-650 mg by mouth every 6 hours as needed for mild pain       calcitRIOL (ROCALTROL) 0.25 MCG capsule Take 1 capsule (0.25 mcg) by mouth 2 times daily for 14 days 28 capsule 0     calcium carbonate (TUMS) 500 MG chewable tablet 1 tablet tid with meals - unknown dose. 84 tablet 0     levothyroxine (SYNTHROID/LEVOTHROID) 125 MCG tablet MON to FRI 1 tablet/day;  SAT and SUN 1.5 tablet 30 tablet 0     levothyroxine (SYNTHROID/LEVOTHROID) 150 MCG tablet Take 1 tablet (150 mcg) by mouth daily 90 tablet 3     mineral oil-hydrophilic petrolatum (AQUAPHOR) external ointment Apply topically every 8 hours Apply to neck incision three times daily 50 g 1     omeprazole (PRILOSEC) 20 MG DR capsule Take 1 capsule (20 mg) by mouth daily 90 capsule 3     OMEPRAZOLE PO Take 20 mg by mouth       senna-docusate (SENOKOT-S/PERICOLACE) 8.6-50 MG tablet Take 1 tablet by mouth 2 times daily 30 tablet 0     telmisartan (MICARDIS) 40 MG tablet Take 1 tablet (40 mg) by mouth daily (Patient not taking: Reported on 5/8/2019) 90 tablet 3     UNABLE TO FIND 20 mg MEDICATION NAME: natrazim       TUMS ? DOSE - 1 tums tid before each meal     Allergies  Allergies   Allergen Reactions     Penicillins Hives     Family History  family history includes Diabetes in his brother; Hypertension in his mother.    Social History  Social History     Tobacco Use     Smoking status: Former Smoker     Smokeless tobacco: Never Used   Substance Use Topics     Alcohol use: Yes     Alcohol/week: 2.4 oz     Types: 4 Cans of beer per week     Frequency: Monthly or less     Comment: once in awhile     Drug use: No       Physical Exam  /76   Pulse 54   Ht 1.638 m (5' 4.5\")   Wt 76.9 kg (169 lb 8 oz)   BMI 28.65 kg/m  "   Body mass index is 28.65 kg/m .  GENERAL :  Pleasant man In no apparent distress  SKIN: Normal color, normal temperature, texture.  No hirsutism, alopecia or purple striae.     EYES: PER, EOMI, No scleral icterus,  No proptosis, conjunctival redness, stare, retraction  NECK: nicely healing surgical scar low transverse and left lateral neck. Minimal induration along the edges.  No redness.   RESP: Lungs clear to auscultation bilaterally  CARDIAC: Regular rate and rhythm, normal S1 S2, without murmurs, rubs or gallops    NEURO: awake, alert, responds appropriately to questions.  Moves all extremities.  No tremor of the outstretched hand.  DTRs   0/4 ,   EXTREMITIES: No clubbing, cyanosis or edema.    DATA REVIEW    Results for AAKASH BUCIO (MRN [id number]) as of 5/19/2019 14:19   Ref. Range 5/8/2019 16:12 5/8/2019 16:19   Creatinine Latest Ref Range: 0.66 - 1.25 mg/dL 0.65 (L)    GFR Estimate Latest Ref Range: >60 mL/min/1.73_m2 >90    GFR Estimate If Black Latest Ref Range: >60 mL/min/1.73_m2 >90    Calcium Latest Ref Range: 8.5 - 10.1 mg/dL 8.9    Phosphorus Latest Ref Range: 2.5 - 4.5 mg/dL 3.3    TSH Latest Ref Range: 0.40 - 4.00 mU/L 2.88        Result 05/08/2019  4:19 PM 1740   SEE NOTE 05/10/2019 07:14 PM    Comment:   (Note)   Test                           Result     Flag  Unit      RefValue   ------------------------------------------------------------------   Iodine/Creat Ratio, Random, U    Iodine/Creat Ratio, U        261              mcg/g Cr  <584          This test was developed and its performance characteristics      determined by [facility] in a manner consistent with CLIA      requirements. This test has not been cleared or approved by      the U.S. Food and Drug Administration.    Creatinine Conc              150              mg/dL                        Test Performed by:      [facility]      [address] [[email or web address]], [place] [address]             Test Performed by:      [facility]

## 2019-05-08 NOTE — ASSESSMENT & PLAN NOTE
mulfifocal, bilateral, subcm primary (4 foci, TTD not possible from path report data),17 LN + with bulky size (up to 4.4 cm) and TOR.  He has been treated with total Tx, CND and left LND.   pT1a, pN1b, pMx, cMx, stage 2 minimum  MACIS 5.72 assuming complete resection and no distant mets  HOWARD recurrence risk HIGH due to # of involved LN, size of involved LN, TOR  I expect we will eventually give adjuvant 131I but we need to be sure he has cleared the iodine contamination first.

## 2019-05-08 NOTE — ASSESSMENT & PLAN NOTE
Was noted post op but with PTH 28.  I expect this will be temporary.  Labs today following the appt confirm normal calcium on calcitriol 0.25 mcg bid and Ca tid. We will call and tell him OK to stop calcitriol.  Continue calcium for now with meals.

## 2019-05-08 NOTE — PATIENT INSTRUCTIONS
Labs and urine test today    See me in one month.      Information for patients on Levo-thyroxine      The thyroid hormone, Levo-thyroxine (L-T4) is one of the main hormones normally produced by the thyroid.  The drug is identical in chemical structure to the natural product.  Levothyroxine is used to treat hypothyroidism (underactive thyroid).  Sometimes it is used even when the thyroid is not underactive, to treat a goiter (enlarged thyroid) or a thyroid nodule.  For patients with a history of thyroid removal, L-T4 is used to replace the deficiency created by the surgery.    The purpose of giving L-T4 to treat hypothyroidism is to make up for the thyroid hormone previously produced by your thyroid before it failed.  Depending on the extent of your thyroid failure, you may require a higher or a lower dose of L-T4.  The goal is to make your blood level of TSH (a hormone made by the master gland, the pituitary, the gland which controls and judges the thyroid) normal.    This drug is usually well-tolerated and safe but it is important to take the proper dose for YOU.  This involves periodic measurements of TSH, usually within 1-2 months of a dose change.  You should be off biotin containing supplements for at least one week prior to thyroid blood tests.    You should alert your doctor if you have signs you are getting too much L-T4.  These include excessive nervousness, heart fluttering or skipping beats, diarrhea, or feeling too hot and/or sweaty.      There are many brand names for Levo-thyroxine (L-T4), including Synthroid, Levoxyl, Levothroid, Unithroid, Tirosint and others.  Changing from one brand name thyroid hormone product to another or to a generic product (all generics are called levothyroxine) can cause changes in your thyroid hormone balance, even if the dose stays the same.  Since generic products can come from many different companies (such as [employer], [employer], [employer] and others), there may be less  consistency among the different generic preparations.  The decision to change brands or to change to a generic product must be made with your physician or other caregiver.  Follow-up testing should be arranged to monitor for any needed adjustments.  Monitoring may need to be more frequent if you always receive a generic thyroid hormone product.    For proper thyroid hormone balance the dose of thyroid hormone in your pills must be precise and consistent.    Be sure to take the medication as prescribed on an empty stomach, and at least 4 hours apart from calcium supplements, iron and soy products.  Store the medication away from severe heat and humidity.    You should be OFF any biotin containing supplements at least 7 days prior to thyroid lab draws.       Many insurance companies and other providers charge higher co-payments for brand name medications.  Since brand name L-T4 is not very expensive, be sure to ask if the actual cost of buying the medication is less than the co-payment.  In some instances the charge for a co-payment may be greater than buying the drug outright, especially if the prescription needs to be refilled every 30 days.

## 2019-05-08 NOTE — LETTER
5/8/2019     RE: Price Rene  [id number]  [facility] [address]     Dear Colleague,    Thank you for referring your patient, Price Rene, to the [facility] ENDOCRINOLOGY at [facility]. Please see a copy of my visit note below.  Endocrine Consult note    Attending Assessment/Plan :     Papillary thyroid carcinoma (H)  mulfifocal, bilateral, subcm primary (4 foci, TTD not possible from path report data),17 LN + with bulky size (up to 4.4 cm) and TOR.  He has been treated with total Tx, CND and left LND.   pT1a, pN1b, pMx, cMx, stage 2 minimum  MACIS 5.72 assuming complete resection and no distant mets  HOWARD recurrence risk HIGH due to # of involved LN, size of involved LN, TOR  I expect we will eventually give adjuvant 131I but we need to be sure he has cleared the iodine contamination first.         Post-surgical hypothyroidism  Treat to target TSH < 0.4 due to the thyroid cancer.    I expect he will need LT4 150 mcg/day. He is currently on 125 mcg/day.     We will call him and tell him to change the LT4 to 125 * 8/week in divided dose (mean 143 mcg/day) until current supply is gone and then increase to 150 mcg/day after that.            Hypocalcemia  Was noted post op but with PTH 28.  I expect this will be temporary.  Labs today following the appt confirm normal calcium on calcitriol 0.25 mcg bid and Ca tid. We will call and tell him OK to stop calcitriol.  Continue calcium for now with meals.      Iodine contrast exposure 4/10/19  spot urine iodine following the appt is 261 mcg/g creatinine- higher than target but not bad for one month out from exposure. We definitely should hold on 131I at least another month.      Yanira Uribe MD      Chief complaint/ HISTORY OF PRESENT ILLNESS  Price presents today, along with his daughter, for follow up of thyroid cancer, newly diagnosed. I have seen him once before, on 4/10/19.  He was seen  along with the  bot.     Left neck mass FNAB was read as positive for papillary thyroid carcinoma prior to our lst appt.  Imaging did not clearly show  a thyroid primary .  Since our last appt he has had surgery, as follows:  4/25/19 total thyroidectomy, left central and modified radical neck dissection levels 2-4.  Right upper parathyroid autotx into left SCM (Dr Yunier Rodriguez).  I have reviewed operative and surgical pathology reports.  The pathology showed papillary thyroid carcinoma, bilateral, largest 0.4 cm left (total tumor diameter not possible from path report), + LN 17/55 (12/26 CND, largest tumor focus 0.4 cm) with + TOR, largest tumor met 4.4 cm left level 2A, but tumor also in level 3 and 4 LNs  He says the operation went fine. He was hospitalized 2 days.  Since the drain was removed the level 1 neck is more prominent.      We have the following labs  4/8/19: Tg 8.1, HOWARD < 0.4,  TSH 1.2  4/25/19 Ca 8.4, PTH 28  4/26/19 Ca 7.8, creatinine 0.71  5/8/19 following appt TSH 2.88, Ca 8.9, phos 3.3, creatinine 0.65    I have reviewed images on PACS  3/14/19 CXR negative  3/20/19 FNAB left neck cystic mass 3.2 x 2.5 x 4.3 - it has one small papillary projection which was where the needle sampled.    4/10/19 thyroid and neck US:   Left superior # 1 0.3  X0.2  X 0.3   Left inferior # 2 0.4 x 0.2  X 0.3 cm   Right level 3 0.8 x0.4 x 1.4 Echogenic hilum  Left level 3 # 1 0.6 x 0.3 x 0.7 cm   Left level 5  Cystic mass 3.2 x 2.3 x 4.1 cm - internal mural area highly suspicious with echogenic foci 1.1 x 1 x 1 cm  4/10/19 CT neck with contrast:   Confirms the US to my eye.    He is not aware of radiation exposure as a child.  He [place] the [place] -there were a lot of chemicals there.     REVIEW OF SYSTEMS  Some pain left level 2 stitch area  No fever  Weight stable   Voice is OK  Sleep at night better since less pain -  Choking on water a little bit - hurts a bit   No regurgitation    Sleeping reclining but at approx 45 degere angle; using a lot of pillows    Something is stuck in there- he points to sternal notch- feels only when reclining   Cardiac: negative  Respiratory: SOB only when reclining - cant' swallow saliva   GI: negative  No paresthesias  No cramps    Past Medical History  Past Medical History:   Diagnosis Date     HTN (hypertension)      Malignant neoplasm of thyroid gland (H) 3/25/2019     Medications    Current Outpatient Medications   Medication Sig Dispense Refill     acetaminophen (TYLENOL) 325 MG tablet Take 325-650 mg by mouth every 6 hours as needed for mild pain       calcitRIOL (ROCALTROL) 0.25 MCG capsule Take 1 capsule (0.25 mcg) by mouth 2 times daily for 14 days 28 capsule 0     calcium carbonate (TUMS) 500 MG chewable tablet 1 tablet tid with meals - unknown dose. 84 tablet 0     levothyroxine (SYNTHROID/LEVOTHROID) 125 MCG tablet MON to FRI 1 tablet/day;  SAT and SUN 1.5 tablet 30 tablet 0     levothyroxine (SYNTHROID/LEVOTHROID) 150 MCG tablet Take 1 tablet (150 mcg) by mouth daily 90 tablet 3     mineral oil-hydrophilic petrolatum (AQUAPHOR) external ointment Apply topically every 8 hours Apply to neck incision three times daily 50 g 1     omeprazole (PRILOSEC) 20 MG DR capsule Take 1 capsule (20 mg) by mouth daily 90 capsule 3     OMEPRAZOLE PO Take 20 mg by mouth       senna-docusate (SENOKOT-S/PERICOLACE) 8.6-50 MG tablet Take 1 tablet by mouth 2 times daily 30 tablet 0     telmisartan (MICARDIS) 40 MG tablet Take 1 tablet (40 mg) by mouth daily (Patient not taking: Reported on 5/8/2019) 90 tablet 3     UNABLE TO FIND 20 mg MEDICATION NAME: natrazim       TUMS ? DOSE - 1 tums tid before each meal     Allergies  Allergies   Allergen Reactions     Penicillins Hives     Family History  family history includes Diabetes in his brother; Hypertension in his mother.    Social History  Social History     Tobacco Use     Smoking status: Former Smoker     Smokeless  "tobacco: Never Used   Substance Use Topics     Alcohol use: Yes     Alcohol/week: 2.4 oz     Types: 4 Cans of beer per week     Frequency: Monthly or less     Comment: once in awhile     Drug use: No       Physical Exam  /76   Pulse 54   Ht 1.638 m (5' 4.5\")   Wt 76.9 kg (169 lb 8 oz)   BMI 28.65 kg/m     Body mass index is 28.65 kg/m .  GENERAL :  Pleasant man In no apparent distress  SKIN: Normal color, normal temperature, texture.  No hirsutism, alopecia or purple striae.     EYES: PER, EOMI, No scleral icterus,  No proptosis, conjunctival redness, stare, retraction  NECK: nicely healing surgical scar low transverse and left lateral neck. Minimal induration along the edges.  No redness.   RESP: Lungs clear to auscultation bilaterally  CARDIAC: Regular rate and rhythm, normal S1 S2, without murmurs, rubs or gallops    NEURO: awake, alert, responds appropriately to questions.  Moves all extremities.  No tremor of the outstretched hand.  DTRs   0/4 ,   EXTREMITIES: No clubbing, cyanosis or edema.    DATA REVIEW    Results for AAKASH BUCIO (MRN [id number]) as of 5/19/2019 14:19   Ref. Range 5/8/2019 16:12 5/8/2019 16:19   Creatinine Latest Ref Range: 0.66 - 1.25 mg/dL 0.65 (L)    GFR Estimate Latest Ref Range: >60 mL/min/1.73_m2 >90    GFR Estimate If Black Latest Ref Range: >60 mL/min/1.73_m2 >90    Calcium Latest Ref Range: 8.5 - 10.1 mg/dL 8.9    Phosphorus Latest Ref Range: 2.5 - 4.5 mg/dL 3.3    TSH Latest Ref Range: 0.40 - 4.00 mU/L 2.88        Result 05/08/2019  4:19 PM 1740   SEE NOTE 05/10/2019 07:14 PM    Comment:   (Note)   Test                           Result     Flag  Unit      RefValue   ------------------------------------------------------------------   Iodine/Creat Ratio, Random, U    Iodine/Creat Ratio, U        261              mcg/g Cr  <584          This test was developed and its performance characteristics      determined by [facility] in a manner consistent with CLIA     "  requirements. This test has not been cleared or approved by      the U.S. Food and Drug Administration.    Creatinine Conc              150              mg/dL                        Test Performed by:      [facility]      [address] [place] [address]            Test Performed by:      [facility]      Again, thank you for allowing me to participate in the care of your patient.      Sincerely,    Yanira Uribe MD

## 2019-05-09 LAB — MISCELLANEOUS TEST: NORMAL

## 2019-05-10 LAB
RESULT: NORMAL
SEND OUTS MISC TEST CODE: NORMAL
SEND OUTS MISC TEST SPECIMEN: NORMAL
TEST NAME: NORMAL

## 2019-05-13 ENCOUNTER — TELEPHONE (OUTPATIENT)
Dept: ENDOCRINOLOGY | Facility: CLINIC | Age: 69
End: 2019-05-13

## 2019-05-13 ENCOUNTER — OFFICE VISIT (OUTPATIENT)
Dept: OTOLARYNGOLOGY | Facility: CLINIC | Age: 69
End: 2019-05-13
Payer: MEDICARE

## 2019-05-13 VITALS — HEIGHT: 66 IN | BODY MASS INDEX: 27.32 KG/M2 | WEIGHT: 170 LBS

## 2019-05-13 DIAGNOSIS — C73 MALIGNANT NEOPLASM OF THYROID GLAND (H): Primary | ICD-10-CM

## 2019-05-13 RX ORDER — LEVOTHYROXINE SODIUM 150 UG/1
150 TABLET ORAL DAILY
Qty: 90 TABLET | Refills: 3 | Status: SHIPPED | OUTPATIENT
Start: 2019-05-13 | End: 2019-06-23

## 2019-05-13 RX ORDER — LEVOTHYROXINE SODIUM 125 UG/1
TABLET ORAL
Qty: 30 TABLET | Refills: 0 | Status: SHIPPED | OUTPATIENT
Start: 2019-05-13 | End: 2019-06-05

## 2019-05-13 ASSESSMENT — MIFFLIN-ST. JEOR: SCORE: 1473.86

## 2019-05-13 ASSESSMENT — PAIN SCALES - GENERAL: PAINLEVEL: MODERATE PAIN (5)

## 2019-05-13 NOTE — TELEPHONE ENCOUNTER
The results  letter was  read to son Alex where  Price is staying per daughter. I also have mailed the letter outside to arrive soon for them to see  the changes needed on medication. Sofie Beltran RN on 5/13/2019 at 5:24 PM  .

## 2019-05-13 NOTE — LETTER
5/13/2019       RE: Price Rene  [address]  [place] [facility] [address]     Dear Colleague,    Thank you for referring your patient, Price Rene, to the [facility] EAR NOSE AND THROAT at [facility]. Please see a copy of my visit note below.    HISTORY OF PRESENT ILLNESS:  Mr. Rene returns for follow up.  He is now a few weeks out from treatment for thyroid cancer via total thyroidectomy, a left level VI and VII dissection, and a left  levels II through IV dissection.  He also had a parathyroid autotransplant.  This revealed bulky disease both in the central compartment and the lateral neck.  He is staged as pathologically T1a N1b MX.  He did have extranodal extension.  His post-surgical PTH level is in the normal range.  His most recent calcium was normal.  His spot iodine creatinine ratio is 261.      He has seen Dr. Uribe already postoperatively and is going to be set up for radioactive iodine treatment.  We did scope his larynx in the hospital, and his vocal folds were fully functional.  Dr. Uribe also increased his Synthroid dose to 150 mcg.      PHYSICAL EXAMINATION:  He is well appearing, in no distress.  Examination of the oral cavity reveals normal mucosa of the tongue, floor of mouth, hard and soft palate, gingival and buccal mucosa, retromolar trigone and posterior pharyngeal wall.  Palpation of floor of mouth, tongue and tongue base were negative.  Mirror exam does not reveal a good view of the larynx today.  Examination of the neck shows a well-healed incision with no evidence of any infection or collection.      IMPRESSION:  Doing well.      PLAN:  The patient will be following up with Dr. Uribe in June for further follow up regarding her anticipated radioactive iodine treatment.     Again, thank you for allowing me to participate in the care of your patient.      Sincerely,    Yunier Rodriguez MD     cc: Yanira  MD Rony    [facility] 101

## 2019-05-13 NOTE — PROGRESS NOTES
HISTORY OF PRESENT ILLNESS:  Mr. Rene returns for follow up.  He is now a few weeks out from treatment for thyroid cancer via total thyroidectomy, a left level VI and VII dissection, and a left  levels II through IV dissection.  He also had a parathyroid autotransplant.  This revealed bulky disease both in the central compartment and the lateral neck.  He is staged as pathologically T1a N1b MX.  He did have extranodal extension.  His post-surgical PTH level is in the normal range.  His most recent calcium was normal.  His spot iodine creatinine ratio is 261.      He has seen Dr. Uribe already postoperatively and is going to be set up for radioactive iodine treatment.  We did scope his larynx in the hospital, and his vocal folds were fully functional.  Dr. Uribe also increased his Synthroid dose to 150 mcg.      PHYSICAL EXAMINATION:  He is well appearing, in no distress.  Examination of the oral cavity reveals normal mucosa of the tongue, floor of mouth, hard and soft palate, gingival and buccal mucosa, retromolar trigone and posterior pharyngeal wall.  Palpation of floor of mouth, tongue and tongue base were negative.  Mirror exam does not reveal a good view of the larynx today.  Examination of the neck shows a well-healed incision with no evidence of any infection or collection.      IMPRESSION:  Doing well.      PLAN:  The patient will be following up with Dr. Uribe in June for further follow up regarding her anticipated radioactive iodine treatment.      cc: Yanira Uribe MD    [facility] 101

## 2019-05-13 NOTE — NURSING NOTE
"Chief Complaint   Patient presents with     Post-op Visit     Thyroid       Height 1.676 m (5' 6\"), weight 77.1 kg (170 lb).    Tammy Moser CMA        "

## 2019-05-13 NOTE — PATIENT INSTRUCTIONS
1. Please continue follow-up with Dr. Uribe  2. Please call the ENT clinic with any questions,concerns, new or worsening symptoms.    -Clinic number is [phone]   - Calli's direct line (Dr. Rodriguez's nurse) [phone]

## 2019-05-29 NOTE — DISCHARGE SUMMARY
Discharge Summary  Price Rene  [id number]  1950    Date of Admission: 4/25/2019  Date of Discharge: 4/26/2019    Admission Diagnosis: Papillary thyroid carcinoma (H) [C73]  Discharge Diagnosis: Same    Procedures:  Date:   Procedure(s):  Total Thyroidectomy, Central Neck Dissection  Left Modified Radical Neck Dissection    Pathology:  FINAL DIAGNOSIS:   A. Total thyroidectomy and pyramidal lobe:   - Papillary thyroid carcinoma        - Tumor focality: Four foci        - Tumor laterality: Left and right lobes        - Largest tumor size: 0.4 cm        - Variant: Classical        - Architecture: Papillary        - Cytomorphology: Classical        - Margins: Uninvolved by carcinoma        - Lymphatic invasion: Not identified        - Perineural invasion: Not identified        - Extrathyroidal extension: Not identified     B. Left neck level 6 and 7:   - Metastatic papillary thyroid carcinoma in twelve of twenty-six lymph   nodes (12/26)   - Maximal tumor dimension: 0.4 cm   - Extranodal extension: Identified   - One benign parathyroid tissue     C. Right inferior parathyroid:   - Minute benign parathyroid tissue     D. Left neck 2A:   - Metastatic papillary thyroid carcinoma in three of ten lymph nodes   (3/10)   - Maximal tumor dimension: 4.4 cm   - Extranodal extension: Identified     E. Left neck 2B:   - No malignancy identified in fifteen lymph nodes (0/15)     F. Left neck 3:   -Metastatic papillary thyroid carcinoma in one of eleven lymph nodes   (1/11)   - Maximal tumor dimension: 1.2 cm   - Extranodal extension: Not identified     G. Left neck 4:   - Metastatic papillary thyroid carcinoma in one of eight lymph nodes (1/8)   - Maximal tumor dimension: 1.2 cm   - Extranodal extension: Not identified     HPI: Mr. Roland is a 68-year-old male who presented with a left level 3 neck mass that was biopsy positive for papillary thyroid carcinoma.  He had an ultrasound and CT of the neck that showed 2  subcentimeter thyroid nodules in the left thyroid and no additional neck disease noted.  After a detailed discussion of risks, benefits and alternatives, he elected to proceed with the above-listed procedure.     It was recommended that he undergo operative intervention and the patient consented to the above procedure after detailed explanation of the risks and benefits of said procedure.    Hospital Course: The patient was admitted to the hospital and underwent the above mentioned procedure. He tolerated the procedure without any intra- or kylie-operative complications. Please see the operative report for full details of the procedure. The patient was admitted for post-operative monitoring. His postoperative course was uneventful. At discharge, the patient's pain was well controlled, the patient was voiding on his own, and was ambulating and tolerating a regular diet.     Discharge Exam:  Vitals:    04/25/19 2000 04/25/19 2336 04/26/19 0353 04/26/19 0700   BP: 136/73 128/76 147/78 143/78   BP Location: Left arm Right arm Right arm Right arm   Pulse:    64   Resp: 21 18  16   Temp: 98.8  F (37.1  C) 98.7  F (37.1  C) 98.8  F (37.1  C) 98.3  F (36.8  C)   TempSrc: Oral Oral Oral Oral   SpO2: 97% 97% 96% 95%   Weight:       Height:         General: Alert and oriented x 3, No acute distress  HEENT: Neck incisions c/d/i, neck soft and flat, ADEEL in place x2 draining serosanguinous fluid, voice strong but hoarse this morning, strong cough    Neuro: Alert and oriented, facial movement full and intact    Pulmonary: Breathing non-labored, no stridor, no accessory muscle use.    Discharge Medications:   Price Moctezuma   Home Medication Instructions FILEMON:[id number]    Printed on:05/28/19 3698   Medication Information                      acetaminophen (TYLENOL) 325 MG tablet  Take 325-650 mg by mouth every 6 hours as needed for mild pain             calcitRIOL (ROCALTROL) 0.25 MCG capsule  Take 1 capsule (0.25 mcg) by  mouth 2 times daily for 14 days             mineral oil-hydrophilic petrolatum (AQUAPHOR) external ointment  Apply topically every 8 hours Apply to neck incision three times daily             omeprazole (PRILOSEC) 20 MG DR capsule  Take 1 capsule (20 mg) by mouth daily             OMEPRAZOLE PO  Take 20 mg by mouth             senna-docusate (SENOKOT-S/PERICOLACE) 8.6-50 MG tablet  Take 1 tablet by mouth 2 times daily             telmisartan (MICARDIS) 40 MG tablet  Take 1 tablet (40 mg) by mouth daily             UNABLE TO FIND  20 mg MEDICATION NAME: natrazim                 Discharge Procedure Orders   Reason for your hospital stay   Order Comments: Post-operative care     Adult [facility] Follow-up and recommended labs and tests   Order Comments: Follow up in ENT clinic with Dr. Rodriguez on Monday, 5/13/19 at 8:15AM. Please call the clinic with questions/concerns: [phone].    Otolaryngology/ENT Clinic:  [facility]  [address] [place] [address]    Appointments on [place] and/or [facility] (with [facility] or [facility] provider or service). Call [phone] if you haven't heard regarding these appointments within 7 days of discharge.     Activity   Order Comments: Your activity upon discharge: No heavy lifting greater than 10 lbs and no strenuous exercise for 2 weeks or until follow up appointment. No driving while taking narcotic pain medications.     Order Specific Question Answer Comments   Is discharge order? Yes      When to contact your care team   Order Comments: Please notify your doctor if you experience wound breakdown, sustained bleeding from the wound site, or increasing redness, swelling, and/or purulent malorodorous discharge from the wound site which may indicate infection. If you feel it is acute, or experience sudden changes in breathing, chest pain, or excessive sleepiness/somnolence please return to the emergency department  "or call 911. If you develop numbness or tingling in your finger tips or lips take 2 Tums and contact your doctor, this could be a sign of low calcium levels. If you have questions or concerns during the day please call ENT clinic and [phone]. If at night you can call [facility] at [phone] and ask for the \"ENT resident on call\".     Monitor and record   Order Comments: You are going home with surgical drains in place. Empty the drains and record output 3 times per day. Squeeze the bulb of the drain and replace cap.  If you have multiple drains, record the outputs separately. Once output is less than 30 mL in 24 hours, please contact the ENT clinic to schedule an appointment for drain removal.     Wound care and dressings   Order Comments: Instructions to care for your wound at home: Keep incisions clean and dry. Apply Aquaphor ointment to incisions three times daily to keep moist. You may shower, do not soak, scrub, or submerge incisions under water. If you have a surgical drain, do not get the drain site wet until 24 hours after the drain has been removed.     Full Code     Order Specific Question Answer Comments   Code status determined by: Discussion with patient/legal decision maker      Diet   Order Comments: Follow this diet upon discharge Regular diet     Order Specific Question Answer Comments   Is discharge order? Yes        Dispo: To home in good condition. All of the patient's questions/concerns have been addressed at this time.     Steffany Escobedo MD  Otolaryngology-Head & Neck Surgery PGY3  Please contact ENT by dialing [phone] and entering job code [phone].    "

## 2019-06-05 ENCOUNTER — OFFICE VISIT (OUTPATIENT)
Dept: ENDOCRINOLOGY | Facility: CLINIC | Age: 69
End: 2019-06-05
Payer: COMMERCIAL

## 2019-06-05 VITALS
HEIGHT: 65 IN | BODY MASS INDEX: 28.86 KG/M2 | DIASTOLIC BLOOD PRESSURE: 80 MMHG | HEART RATE: 71 BPM | SYSTOLIC BLOOD PRESSURE: 166 MMHG | WEIGHT: 173.2 LBS

## 2019-06-05 DIAGNOSIS — C73 PAPILLARY THYROID CARCINOMA (H): ICD-10-CM

## 2019-06-05 DIAGNOSIS — C73 PAPILLARY THYROID CARCINOMA (H): Primary | ICD-10-CM

## 2019-06-05 DIAGNOSIS — E89.0 POST-SURGICAL HYPOTHYROIDISM: ICD-10-CM

## 2019-06-05 DIAGNOSIS — E83.51 HYPOCALCEMIA: ICD-10-CM

## 2019-06-05 LAB
CALCIUM SERPL-MCNC: 8.7 MG/DL (ref 8.5–10.1)
CREAT SERPL-MCNC: 0.72 MG/DL (ref 0.66–1.25)
GFR SERPL CREATININE-BSD FRML MDRD: >90 ML/MIN/{1.73_M2}
PHOSPHATE SERPL-MCNC: 2.7 MG/DL (ref 2.5–4.5)
T4 FREE SERPL-MCNC: 1.03 NG/DL (ref 0.76–1.46)
TSH SERPL DL<=0.005 MIU/L-ACNC: 5.5 MU/L (ref 0.4–4)

## 2019-06-05 ASSESSMENT — MIFFLIN-ST. JEOR: SCORE: 1464.57

## 2019-06-05 ASSESSMENT — PAIN SCALES - GENERAL: PAINLEVEL: NO PAIN (0)

## 2019-06-05 NOTE — PROGRESS NOTES
Endocrine Consult note    Attending Assessment/Plan :     Papillary thyroid carcinoma (H)  mulfifocal, bilateral, subcm primary (4 foci, TTD not possible from path report data),17 LN + with bulky size (up to 4.4 cm) and TOR.  He has been treated with total Tx, CND and left LND.   pT1a, pN1b, pMx, cMx, stage 2 minimum  MACIS 5.72 assuming complete resection and no distant mets  HOWARD recurrence risk HIGH due to # of involved LN, size of involved LN, TOR  Set up 2 dose thyrogen stimulated 131I adjuvant therapy - 150 mCi because of the bulky adenopathy with TOR.    Admit for the lst 24 hour after treatment due to the 3 year old at home.  I have counseled him today on radiation safety after discharge from hospital as well as low iodine diet, 131I treatment protocol as written on the patient instructions. We converted the page to Brazilian using [employer] translate .         Post-surgical hypothyroidism  Treat to target TSH < 0.4 due to the thyroid cancer.   Unstable .  Recent LT4 dose increase from 125 to  150 mcg/day.  He is not in steady state on this dose which has been in place since 5/14/19.           Hypocalcemia  Was noted post op but with PTH 28.  I expect this will be temporary.  Current status unknown off calcitriol. Labs following the appt show  Normal calcium on calcium tid.  Reduce calcium to one/day    Greater than 50% of 25+ minute appt on counseling and coordination of care    Yanira Uribe MD      Chief complaint/ HISTORY OF PRESENT ILLNESS  Price presents today, along with a young man, for follow up of thyroid cancer, post surgical hypothyroidism.     Left neck mass FNAB was read as positive for papillary thyroid carcinoma prior to our lst appt.  Imaging did not clearly show  a thyroid primary .   His treatment has been as follows:   4/25/19 total thyroidectomy, left central and modified radical neck dissection levels 2-4.  Right upper parathyroid autotx into left SCM (Dr Yunier Rodriguez).  I have  reviewed operative and surgical pathology reports.  The pathology showed papillary thyroid carcinoma, bilateral, largest 0.4 cm left (total tumor diameter not possible from path report), + LN 17/55 (12/26 CND, largest tumor focus 0.4 cm) with + TOR, largest tumor met 4.4 cm left level 2A, but tumor also in level 3 and 4 LNs    We have the following labs  4/8/19: Tg 8.1, HOWARD < 0.4,  TSH 1.2  4/25/19 Ca 8.4, PTH 28  4/26/19 Ca 7.8, creatinine 0.71  5/8/19 TSH 2.88, Ca 8.9, phos 3.3, creatinine 0.65, urine iodine 261 mg/g creatinine  6/5/19 following appt Tg pending, TSH 5.5, free T4 1.03, Ca 8.7, phos 2.7, creatinine 0.72; Tg < 0.1, HOWARD < 0.4 - results followed appt, not discussed at appt.     I have reviewed images on PACS  3/14/19 CXR negative  3/20/19 FNAB left neck cystic mass 3.2 x 2.5 x 4.3 - it has one small papillary projection which was where the needle sampled.    4/10/19 thyroid and neck US:   Left superior # 1 0.3  X0.2  X 0.3   Left inferior # 2 0.4 x 0.2  X 0.3 cm   Right level 3 0.8 x0.4 x 1.4 Echogenic hilum  Left level 3 # 1 0.6 x 0.3 x 0.7 cm   Left level 5  Cystic mass 3.2 x 2.3 x 4.1 cm - internal mural area highly suspicious with echogenic foci 1.1 x 1 x 1 cm  4/10/19 CT neck with contrast:   Confirms the US        REVIEW OF SYSTEMS  Sleep OK  voice OK  Issue to pass saliva when reclining flat -   Sleeping more flat than before -   If head is flexed he can't pass saliva  Cardiac: negative  Respiratory: A little cough x 3 days  GI: negative  No paresthesias or cramps    Past Medical History  Past Medical History:   Diagnosis Date     HTN (hypertension)      Malignant neoplasm of thyroid gland (H) 3/25/2019     Medications    Current Outpatient Medications   Medication Sig Dispense Refill     acetaminophen (TYLENOL) 325 MG tablet Take 325-650 mg by mouth every 6 hours as needed for mild pain       calcium carbonate (TUMS) 500 MG chewable tablet Take 1 tablet (500 mg) by mouth daily 84 tablet 0      "levothyroxine (SYNTHROID/LEVOTHROID) 150 MCG tablet Take 1 tablet (150 mcg) by mouth daily 90 tablet 3     mineral oil-hydrophilic petrolatum (AQUAPHOR) external ointment Apply topically every 8 hours Apply to neck incision three times daily 50 g 1     omeprazole (PRILOSEC) 20 MG DR capsule Take 1 capsule (20 mg) by mouth daily 90 capsule 3     OMEPRAZOLE PO Take 20 mg by mouth       senna-docusate (SENOKOT-S/PERICOLACE) 8.6-50 MG tablet Take 1 tablet by mouth 2 times daily 30 tablet 0     telmisartan (MICARDIS) 40 MG tablet Take 1 tablet (40 mg) by mouth daily 90 tablet 3     [START ON 7/8/2019] thyrotropin (THYROGEN) 1.1 MG SOLR injection Inject Thyrogen 0.9 mg IM every 24 hrs x 2 doses 1 mL 0     UNABLE TO FIND 20 mg MEDICATION NAME: natrazivega       TUMS ? DOSE - 1 tums tid before each meal   LT4 150 start date 5/14/19    Allergies  Allergies   Allergen Reactions     Penicillins Hives     Family History  family history includes Diabetes in his brother; Hypertension in his mother.    Social History  Social History     Tobacco Use     Smoking status: Former Smoker     Smokeless tobacco: Never Used   Substance Use Topics     Alcohol use: Yes     Alcohol/week: 2.4 oz     Types: 4 Cans of beer per week     Frequency: Monthly or less     Comment: once in awhile     Drug use: No     He is living with his daughter; Daughter and 2 kids (15 and 3 years of age).      Physical Exam  /80   Pulse 71   Ht 1.638 m (5' 4.5\")   Wt 78.6 kg (173 lb 3.2 oz)   BMI 29.27 kg/m    Body mass index is 29.27 kg/m .  GENERAL :  Pleasant man In no apparent distress  SKIN: Normal color, normal temperature, texture.   EYES: PER, No scleral icterus,  No proptosis, conjunctival redness, stare, retraction  NECK: healed surgical scar low transverse and left lateral neck..   RESP: Lungs clear to auscultation bilaterally  CARDIAC: Regular rate and rhythm, normal S1 S2, without murmurs, rubs or gallops    NEURO: awake, alert, responds " appropriately to questions.  Moves all extremities.  No tremor of the outstretched hand.    EXTREMITIES: No clubbing, cyanosis or edema.    DATA REVIEW    Results for AAKASH BUCIO (MRN [id number]) as of 6/7/2019 10:49   Ref. Range 5/8/2019 16:12 5/8/2019 16:19 6/5/2019 18:25   Creatinine Latest Ref Range: 0.66 - 1.25 mg/dL 0.65 (L)  0.72   GFR Estimate Latest Ref Range: >60 mL/min/1.73_m2 >90  >90   GFR Estimate If Black Latest Ref Range: >60 mL/min/1.73_m2 >90  >90   Calcium Latest Ref Range: 8.5 - 10.1 mg/dL 8.9  8.7   Phosphorus Latest Ref Range: 2.5 - 4.5 mg/dL 3.3  2.7   T4 Free Latest Ref Range: 0.76 - 1.46 ng/dL   1.03   TSH Latest Ref Range: 0.40 - 4.00 mU/L 2.88  5.50 (H)

## 2019-06-05 NOTE — ASSESSMENT & PLAN NOTE
Was noted post op but with PTH 28.  I expect this will be temporary.  Current status unknown off calcitriol. Labs following the appt show  Normal calcium on calcium tid.  Reduce calcium to one/day

## 2019-06-05 NOTE — PATIENT INSTRUCTIONS
Orders  THYROGEN 2 DOSE, 131 I ABLATION OF RESIDUAL THYROID - INPATIENT PROTOCOL  Phone: [phone]   Fax: [phone]    Diagnosis: 89 (thyroid cancer)    Ordering Provider: Yanira Uribe MD    You should continue taking your usual dose of levothyroxine throughout the schedule noted below    Start low iodine diet < 50 mcg/day.                                         Wednesday 6/26/19  (low iodine recipes at [email or web address].org)    Thyrogen 0.9 mg IM Dose # 1           Monday afternoon 7/8/19  [facility] 3rd floor Endocrinology clinic    Thyrogen 0.9 mg IM Dose # 2                                     Tuesday afternoon 7/9/19  [facility] 3rd floor Endocrinology clinic    Lab Work                                                              Before 8:30 AM, Wednesday 7/10/19      Admit for treatment (special room on )                             11 AM Wednesday 7/10/19  This is considered an OBSERVATION for insurance/billing purposes.  Report to 2nd floor of [facility], Admission Area.  Recommend you eat breakfast before coming - you may miss lunch.      Usually discharge is the day after admission, around noon, after radioactivity level is demonstrated to be acceptable target level.      Start radiation isolation at home                                            Immediately after Discharge .7/10/19  Stop low iodine diet        Immediately after discharge from hospital  Stop radiation isolation                                 Monday, 7/15/19  Post therapy scan             Monday, 7/15/19    Located on the 2nd floor of [facility], Radiology Department    _______________________________________    In order to minimize exposure to radiation to others from the radioiodine inside your body, you should do the following for Four days after discharge, ending 7/15/19    Sleep alone.  Kissing and sexual intercourse should be avoided  Avoid prolonged close contact with young  children and pregnant women.  Wash your hands with soap and water after each visit to the bathroom  Flush the toilet 2 times after each use  Rinse the bathroom sink and tub after use  Drink plenty of liquids to assist in the removal of radioactive material that are circulating in the blood stream  Use separate eating utensils and wash them separately  Use separate towels and wash cloths and wash them separately to avoid cross contamination.  Avoid public transportation

## 2019-06-05 NOTE — ASSESSMENT & PLAN NOTE
Treat to target TSH < 0.4 due to the thyroid cancer.   Unstable .  Recent LT4 dose increase from 125 to  150 mcg/day.  He is not in steady state on this dose which has been in place since 5/14/19.

## 2019-06-05 NOTE — LETTER
6/5/2019       RE: Price Rene  [phone]  [facility] [address]     Dear Colleague,    Thank you for referring your patient, Price Rene, to the [facility] ENDOCRINOLOGY at [facility]. Please see a copy of my visit note below.    Endocrine Consult note    Attending Assessment/Plan :     Papillary thyroid carcinoma (H)  mulfifocal, bilateral, subcm primary (4 foci, TTD not possible from path report data),17 LN + with bulky size (up to 4.4 cm) and TOR.  He has been treated with total Tx, CND and left LND.   pT1a, pN1b, pMx, cMx, stage 2 minimum  MACIS 5.72 assuming complete resection and no distant mets  HOWARD recurrence risk HIGH due to # of involved LN, size of involved LN, TOR  Set up 2 dose thyrogen stimulated 131I adjuvant therapy - 150 mCi because of the bulky adenopathy with TOR.    Admit for the lst 24 hour after treatment due to the 3 year old at home.  I have counseled him today on radiation safety after discharge from hospital as well as low iodine diet, 131I treatment protocol as written on the patient instructions. We converted the page to Ivorian using google translate .         Post-surgical hypothyroidism  Treat to target TSH < 0.4 due to the thyroid cancer.   Unstable .  Recent LT4 dose increase from 125 to  150 mcg/day.  He is not in steady state on this dose which has been in place since 5/14/19.           Hypocalcemia  Was noted post op but with PTH 28.  I expect this will be temporary.  Current status unknown off calcitriol. Labs following the appt show  Normal calcium on calcium tid.  Reduce calcium to one/day    Greater than 50% of 25+ minute appt on counseling and coordination of care    Yanira Uribe MD      Chief complaint/ HISTORY OF PRESENT ILLNESS  Price presents today, along with a young man, for follow up of thyroid cancer, post surgical hypothyroidism.     Left neck mass FNAB was read as positive for  papillary thyroid carcinoma prior to our lst appt.  Imaging did not clearly show  a thyroid primary .   His treatment has been as follows:   4/25/19 total thyroidectomy, left central and modified radical neck dissection levels 2-4.  Right upper parathyroid autotx into left SCM (Dr Yunier Rodriguez).  I have reviewed operative and surgical pathology reports.  The pathology showed papillary thyroid carcinoma, bilateral, largest 0.4 cm left (total tumor diameter not possible from path report), + LN 17/55 (12/26 CND, largest tumor focus 0.4 cm) with + TOR, largest tumor met 4.4 cm left level 2A, but tumor also in level 3 and 4 LNs    We have the following labs  4/8/19: Tg 8.1, HOWARD < 0.4,  TSH 1.2  4/25/19 Ca 8.4, PTH 28  4/26/19 Ca 7.8, creatinine 0.71  5/8/19 TSH 2.88, Ca 8.9, phos 3.3, creatinine 0.65, urine iodine 261 mg/g creatinine  6/5/19 following appt Tg pending, TSH 5.5, free T4 1.03, Ca 8.7, phos 2.7, creatinine 0.72    I have reviewed images on PACS  3/14/19 CXR negative  3/20/19 FNAB left neck cystic mass 3.2 x 2.5 x 4.3 - it has one small papillary projection which was where the needle sampled.    4/10/19 thyroid and neck US:   Left superior # 1 0.3  X0.2  X 0.3   Left inferior # 2 0.4 x 0.2  X 0.3 cm   Right level 3 0.8 x0.4 x 1.4 Echogenic hilum  Left level 3 # 1 0.6 x 0.3 x 0.7 cm   Left level 5  Cystic mass 3.2 x 2.3 x 4.1 cm - internal mural area highly suspicious with echogenic foci 1.1 x 1 x 1 cm  4/10/19 CT neck with contrast:   Confirms the US        REVIEW OF SYSTEMS  Sleep OK  voice OK  Issue to pass saliva when reclining flat -   Sleeping more flat than before -   If head is flexed he can't pass saliva  Cardiac: negative  Respiratory: A little cough x 3 days  GI: negative  No paresthesias or cramps    Past Medical History  Past Medical History:   Diagnosis Date     HTN (hypertension)      Malignant neoplasm of thyroid gland (H) 3/25/2019     Medications    Current Outpatient Medications  "  Medication Sig Dispense Refill     acetaminophen (TYLENOL) 325 MG tablet Take 325-650 mg by mouth every 6 hours as needed for mild pain       calcium carbonate (TUMS) 500 MG chewable tablet Take 1 tablet (500 mg) by mouth daily 84 tablet 0     levothyroxine (SYNTHROID/LEVOTHROID) 150 MCG tablet Take 1 tablet (150 mcg) by mouth daily 90 tablet 3     mineral oil-hydrophilic petrolatum (AQUAPHOR) external ointment Apply topically every 8 hours Apply to neck incision three times daily 50 g 1     omeprazole (PRILOSEC) 20 MG DR capsule Take 1 capsule (20 mg) by mouth daily 90 capsule 3     OMEPRAZOLE PO Take 20 mg by mouth       senna-docusate (SENOKOT-S/PERICOLACE) 8.6-50 MG tablet Take 1 tablet by mouth 2 times daily 30 tablet 0     telmisartan (MICARDIS) 40 MG tablet Take 1 tablet (40 mg) by mouth daily 90 tablet 3     [START ON 7/8/2019] thyrotropin (THYROGEN) 1.1 MG SOLR injection Inject Thyrogen 0.9 mg IM every 24 hrs x 2 doses 1 mL 0     UNABLE TO FIND 20 mg MEDICATION NAME: elisabeth       TUMS ? DOSE - 1 tums tid before each meal   LT4 150 start date 5/14/19    Allergies  Allergies   Allergen Reactions     Penicillins Hives     Family History  family history includes Diabetes in his brother; Hypertension in his mother.    Social History  Social History     Tobacco Use     Smoking status: Former Smoker     Smokeless tobacco: Never Used   Substance Use Topics     Alcohol use: Yes     Alcohol/week: 2.4 oz     Types: 4 Cans of beer per week     Frequency: Monthly or less     Comment: once in awhile     Drug use: No     He is living with his daughter; Daughter and 2 kids (15 and 3 years of age).      Physical Exam  /80   Pulse 71   Ht 1.638 m (5' 4.5\")   Wt 78.6 kg (173 lb 3.2 oz)   BMI 29.27 kg/m     Body mass index is 29.27 kg/m .  GENERAL :  Pleasant man In no apparent distress  SKIN: Normal color, normal temperature, texture.   EYES: PER, No scleral icterus,  No proptosis, conjunctival redness, stare, " retraction  NECK: healed surgical scar low transverse and left lateral neck..   RESP: Lungs clear to auscultation bilaterally  CARDIAC: Regular rate and rhythm, normal S1 S2, without murmurs, rubs or gallops    NEURO: awake, alert, responds appropriately to questions.  Moves all extremities.  No tremor of the outstretched hand.    EXTREMITIES: No clubbing, cyanosis or edema.    DATA REVIEW    Results for AAKASH BUCIO (MRN [id number]) as of 6/7/2019 10:49   Ref. Range 5/8/2019 16:12 5/8/2019 16:19 6/5/2019 18:25   Creatinine Latest Ref Range: 0.66 - 1.25 mg/dL 0.65 (L)  0.72   GFR Estimate Latest Ref Range: >60 mL/min/1.73_m2 >90  >90   GFR Estimate If Black Latest Ref Range: >60 mL/min/1.73_m2 >90  >90   Calcium Latest Ref Range: 8.5 - 10.1 mg/dL 8.9  8.7   Phosphorus Latest Ref Range: 2.5 - 4.5 mg/dL 3.3  2.7   T4 Free Latest Ref Range: 0.76 - 1.46 ng/dL   1.03   TSH Latest Ref Range: 0.40 - 4.00 mU/L 2.88  5.50 (H)     Again, thank you for allowing me to participate in the care of your patient.      Sincerely,    Yanira Uribe MD

## 2019-06-05 NOTE — ASSESSMENT & PLAN NOTE
mulfifocal, bilateral, subcm primary (4 foci, TTD not possible from path report data),17 LN + with bulky size (up to 4.4 cm) and TOR.  He has been treated with total Tx, CND and left LND.   pT1a, pN1b, pMx, cMx, stage 2 minimum  MACIS 5.72 assuming complete resection and no distant mets  HOWARD recurrence risk HIGH due to # of involved LN, size of involved LN, TOR  Set up 2 dose thyrogen stimulated 131I adjuvant therapy - 150 mCi because of the bulky adenopathy with TOR.    Admit for the lst 24 hour after treatment due to the 3 year old at home.  I have counseled him today on radiation safety after discharge from hospital as well as low iodine diet, 131I treatment protocol as written on the patient instructions. We converted the page to Swedish using [employer] translate .

## 2019-06-07 ENCOUNTER — TELEPHONE (OUTPATIENT)
Dept: ENDOCRINOLOGY | Facility: CLINIC | Age: 69
End: 2019-06-07

## 2019-06-07 RX ORDER — CALCIUM CARBONATE 500 MG/1
1 TABLET, CHEWABLE ORAL DAILY
Qty: 84 TABLET | Refills: 0 | Status: SHIPPED | OUTPATIENT
Start: 2019-06-07 | End: 2020-04-08

## 2019-06-07 NOTE — TELEPHONE ENCOUNTER
Orders  THYROGEN 2 DOSE, 131 I ABLATION OF RESIDUAL THYROID - INPATIENT PROTOCOL  Phone: [phone]   Fax: [phone]    Diagnosis: 193 (thyroid cancer)    Ordering Provider: Yanira Uribe MD    You should continue taking your usual dose of levothyroxine throughout the schedule noted below    Start low iodine diet < 50 mcg/day.                                         Tuesday ...  (low iodine recipes at [email or web address])    Thyrogen 0.9 mg IM Dose # 1           Monday afternoon ...  [facility] 3rd floor Endocrinology clinic    Thyrogen 0.9 mg IM Dose # 2                                     Tuesday  afternoon ...  [facility] 3rd floor Endocrinology clinic    Lab Work                                                              Before 8:30 AM, Wednesday ...      Admit for treatment (special room on )                             11 AM Wednesday  This is considered an OBSERVATION for insurance/billing purposes.  Report to 2nd floor of [facility], Admission Area.  Recommend you eat breakfast before coming - you may miss lunch.      Usually discharge is the day after admission, around noon, after radioactivity level is demonstrated to be acceptable target level.      Start radiation isolation at home                                            Immediately after Discharge .  Stop low iodine diet        Immediately after discharge from hospital  Stop radiation isolation                                 Monday, ...  Post therapy scan             Monday, ...    Located on the 2nd floor of [facility], Radiology Department     Subjective   Patient is a 39 y.o. female presenting with flank pain.   History provided by:  Patient  Flank Pain   Pain location:  L flank  Pain quality: aching, sharp and stabbing    Pain radiates to:  Does not radiate  Pain severity:  Moderate  Onset quality:  Sudden  Timing:  Constant  Progression:  Worsening  Chronicity:  New  Relieved by:  None tried  Worsened by:  Nothing  Ineffective treatments:  None tried  Associated symptoms: no chest pain, no dysuria, no fever and no hematuria        Review of Systems   Constitutional: Negative.  Negative for fever.   HENT: Negative.    Respiratory: Negative.    Cardiovascular: Negative.  Negative for chest pain.   Gastrointestinal: Negative.  Negative for abdominal pain.   Endocrine: Negative.    Genitourinary: Positive for flank pain. Negative for dysuria and hematuria.   Skin: Negative.    Neurological: Negative.    Psychiatric/Behavioral: Negative.    All other systems reviewed and are negative.      Past Medical History:   Diagnosis Date   • Kidney stones    • Migraine    • Pneumonia    • Strep throat        Allergies   Allergen Reactions   • Compazine [Prochlorperazine Edisylate]    • Sulfa Antibiotics        Past Surgical History:   Procedure Laterality Date   •  SECTION     • COLONOSCOPY     • CYSTOSCOPY ELECTROHYDRAULIC LITHOTRIPSY     • DENTAL PROCEDURE     • KIDNEY STONE SURGERY         History reviewed. No pertinent family history.    Social History     Social History   • Marital status:      Spouse name: N/A   • Number of children: N/A   • Years of education: N/A     Social History Main Topics   • Smoking status: Current Every Day Smoker     Packs/day: 0.50     Types: Cigarettes   • Smokeless tobacco: Never Used   • Alcohol use No   • Drug use: No   • Sexual activity: Defer     Other Topics Concern   • None     Social History Narrative           Objective   Physical Exam   Constitutional: She is oriented to person, place, and time. She appears  well-developed and well-nourished. No distress.   HENT:   Head: Normocephalic and atraumatic.   Right Ear: External ear normal.   Left Ear: External ear normal.   Nose: Nose normal.   Eyes: Conjunctivae and EOM are normal. Pupils are equal, round, and reactive to light.   Neck: Normal range of motion. Neck supple. No JVD present. No tracheal deviation present.   Cardiovascular: Normal rate, regular rhythm and normal heart sounds.    No murmur heard.  Pulmonary/Chest: Effort normal and breath sounds normal. No respiratory distress. She has no wheezes.   Abdominal: Soft. Bowel sounds are normal. There is no tenderness. There is CVA tenderness.   Musculoskeletal: Normal range of motion. She exhibits no edema or deformity.   Neurological: She is alert and oriented to person, place, and time. No cranial nerve deficit.   Skin: Skin is warm and dry. No rash noted. She is not diaphoretic. No erythema. No pallor.   Psychiatric: She has a normal mood and affect. Her behavior is normal. Thought content normal.   Nursing note and vitals reviewed.      Procedures         ED Course  ED Course   Value Comment By Time    Discussed results of CT with patient.  She is very upset stating that this is not the first time we have missed kidney stones on her CT scan.  Last time she went home and passed them, bringing them back to show us.  I suggested a follow up with urology.  She tells me that she is upset with Dr. Alcazar's office for the same thing.  I again apologize to here and suggest a follow up with urology. CELINE Mcclelland 10/04 1704   CT Abdomen Pelvis Stone Protocol Nonobstructive renal calculi. CELINE Mcclelland 10/04 1704                  MDM  Number of Diagnoses or Management Options  Renal calculi: new and does not require workup     Amount and/or Complexity of Data Reviewed  Clinical lab tests: reviewed  Tests in the radiology section of CPT®: reviewed    Risk of Complications, Morbidity, and/or  Mortality  Presenting problems: low  Diagnostic procedures: low  Management options: low    Patient Progress  Patient progress: stable      Final diagnoses:   Renal calculi            CELINE Mcclelland  10/04/17 6813

## 2019-06-07 NOTE — TELEPHONE ENCOUNTER
Spoke w/ Pts daughter via [employer] Uruguayan speaking  , Week of June 17 will work for them, they need to come in the morning before she leaves for work.   Scheduled with Catrachita at [facility] to schedule Wed 06/26/2019 for 1:00pm Therapy- inpatient Radiation Therapy (Dr Alexander to call if questions) Uruguayan speaking  requested. Catrachita will call to reserve observation room for overnight for Pt.   Pts daughter notified, she confirms week of Padmini 24 for treatment.   Keshia Colunga RN on 6/7/2019 at 4:33 PM

## 2019-06-07 NOTE — TELEPHONE ENCOUNTER
----- Message from Mallorie Hodgson MA sent at 6/7/2019  9:10 AM CDT -----  Regarding: FW: nurse only visit for thyrogen     ----- Message -----  From: Reginaldo Howard CMA  Sent: 6/5/2019   6:23 PM  To: [id number]  Subject: nurse only visit for thyrogen                    Please Set up admission , thyrogen injection and treatment as indicated on the patient instructions.   Prior auth for thyrogen

## 2019-06-07 NOTE — TELEPHONE ENCOUNTER
Was asked to call after 3:00pm to speak with Pts suzan Peters for scheduling.   Keshia Colunga RN on 6/7/2019 at 11:53 AM

## 2019-06-07 NOTE — TELEPHONE ENCOUNTER
Orders  THYROGEN 2 DOSE, 131 I ABLATION OF RESIDUAL THYROID - INPATIENT PROTOCOL  Phone: [phone]   Fax: [phone]    Diagnosis: 193 (thyroid cancer)    Ordering Provider: Yanira Uribe MD    You should continue taking your usual dose of levothyroxine throughout the schedule noted below    Start low iodine diet < 50 mcg/day.                                         Tuesday ...  (low iodine recipes at [email or web address])    Thyrogen 0.9 mg IM Dose # 1           Monday afternoon ...  [facility] [place] clinic    Thyrogen 0.9 mg IM Dose # 2                                     Tuesday  afternoon ...  [facility] Endocrinology clinic    Lab Work                                                              Before 8:30 AM, Wednesday ...      Admit for treatment (special room on )                             11 AM Wednesday  This is considered an OBSERVATION for insurance/billing purposes.  Report to 2nd floor of [facility], Admission Area.  Recommend you eat breakfast before coming - you may miss lunch.      Usually discharge is the day after admission, around noon, after radioactivity level is demonstrated to be acceptable target level.      Start radiation isolation at home                                            Immediately after Discharge .  Stop low iodine diet        Immediately after discharge from hospital  Stop radiation isolation                                 Monday, ...  Post therapy scan             Monday, ...    Located on the 2nd floor of [facility], Radiology Department

## 2019-06-08 ENCOUNTER — TELEPHONE (OUTPATIENT)
Dept: ENDOCRINOLOGY | Facility: CLINIC | Age: 69
End: 2019-06-08

## 2019-06-08 ENCOUNTER — PATIENT OUTREACH (OUTPATIENT)
Dept: ENDOCRINOLOGY | Facility: CLINIC | Age: 69
End: 2019-06-08

## 2019-06-08 NOTE — TELEPHONE ENCOUNTER
Orders  THYROGEN 2 DOSE, 131 I ABLATION OF RESIDUAL THYROID - INPATIENT PROTOCOL  Phone: [phone]   Fax: [phone]    Diagnosis: 193 (thyroid cancer)    Ordering Provider: Yanira Uribe MD    You should continue taking your usual dose of levothyroxine throughout the schedule noted below    Start low iodine diet < 50 mcg/day.                      Thursday 06/27/19   (low iodine recipes at [email or web address].org)    Thyrogen 0.9 mg IM Dose # 1     Wednesday  07/10/ 2019 1:00pm   [facility] 3rd floor Endocrinology clinic    Thyrogen 0.9 mg IM Dose # 2               Thursday 07/11/2019 1:00pm  [facility] 3rd floor Endocrinology clinic    Lab Work ( TSH, CBC with PLT, Thyroglobulin ) Friday 07/12/19  8:00 AM    [facility] 1st floor  Lab  [address], [place].    Admit for treatment (special room on )   Arrive    Friday 07/12/2019 11:00 AM  This is considered an OBSERVATION for insurance/billing purposes.  Report to 2nd floor of [facility], Admission Area.  Recommend you eat breakfast before coming - you may miss lunch.      Usually discharge is the day after admission, around 12noon, after radioactivity level is demonstrated to be acceptable target level.      Start radiation isolation at home         Immediately after Discharge .  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    Proveedor de pedidos: Yanira Uribe MD    Debe continuar tomando young dosis habitual de levotiroxina a lo vini del programa que se detalla a continuación.    Comience shorty dieta  baja en yodo <50 mcg / día. Henry  06/ 26/2019   (recetas bajas en yodo en [email or web address])    Thyrogen 0.9 mg IM dosis # 1 07/ 10/19  1:00 pm  [facility] de [facility]olog[facility] [place] [facility]    Thyrogen 0.9 mg IM dosis # 2,  07/11/19 1:00 pm  Clínica de endocrinología 3er \[facility] [place] [facility]    Trabajo de laboratorio antes de las 8:30 am, miércoles 07/12/2019      Admisión para tratamiento (main especial el 7C)  11:00 a 07/12/2019  [place] se considera shorty OBSERVACIÓN para fines de seguro / facturación.  Reporte al yessi piso del [facility], Área de Admisión.  Te recomiendo desayunar antes de venir, puedes perderte el almuerzo.      Por lo general, el elijah es el día después de la admisión, alrededor de las 12 del mediodía, después de que se demuestra que el nivel objetivo de la radiactividad es aceptable.      Iniciar el aislamiento de radiación en casa inmediatamente después de la descarga.  Detener la dieta baja en yodo inmediatamente después del elijah hospitalaria  Detener el aislamiento por radiación lunes, 07/17/2019.  Exploración posterior a la terapia unes, 07 / 17 / 2019 9:00 pm    Ubicado en el yessi piso del [facility], Departamento de Radiología

## 2019-06-13 LAB — LAB SCANNED RESULT: NORMAL

## 2019-06-14 ENCOUNTER — TELEPHONE (OUTPATIENT)
Dept: ENDOCRINOLOGY | Facility: CLINIC | Age: 69
End: 2019-06-14

## 2019-06-14 NOTE — TELEPHONE ENCOUNTER
Please reschedule Pts nurse and lab visits. Thank you.     Orders  THYROGEN 2 DOSE, 131 I ABLATION OF RESIDUAL THYROID - INPATIENT PROTOCOL  Phone: [phone]   Fax: [phone]     Diagnosis: 193 (thyroid cancer)     Ordering Provider: Yanira Uribe MD     You should continue taking your usual dose of levothyroxine throughout the schedule noted below     Start low iodine diet < 50 mcg/day.                        Wednesday 6/26/19  (low iodine recipes at [email or web address].org)     Thyrogen 0.9 mg IM Dose # 1                  Monday 7/8/19 1:00pm  [facility] [place]     Thyrogen 0.9 mg IM Dose # 2                  Tuesday 7/9/19 1:00pm  [facility] [place]     Lab Work                                                   Before 8:30 AM, Wednesday 7/10/19        Admit for treatment (special room on )         11 AM Wednesday 7/10/19 11:00am for 1:00pm treatment  This is considered an OBSERVATION for insurance/billing purposes.  Report to 2nd floor of [facility], Admission Area.  Recommend you eat breakfast before coming - you may miss lunch.        Usually discharge is the day after admission, around noon, after radioactivity level is demonstrated to be acceptable target level.        Start radiation isolation at home                  Immediately after Discharge .7/11/19  Stop low iodine diet                                      Immediately after discharge from hospital  Stop radiation isolation                                 Monday, 7/15/19  Post therapy scan                                        Monday, 7/15/19 9:00am     Located on the 2nd floor of [facility], Radiology Department

## 2019-06-18 ENCOUNTER — TELEPHONE (OUTPATIENT)
Dept: ENDOCRINOLOGY | Facility: CLINIC | Age: 69
End: 2019-06-18

## 2019-06-18 NOTE — TELEPHONE ENCOUNTER
Anel Vargas <[email or web address]>  Tue 6/18/2019 1:13 PM  This is approved as of 06/10/2019.         Thank you!    Anel

## 2019-06-18 NOTE — TELEPHONE ENCOUNTER
Spoke w/ daughter Kisha Via Polish speaking ; covered scheduled I-131 diet, schedule for week of July 08-12 and Mon Jul 15, 2019. Schedule and diet sent via [employer].   Keshia Colunga RN on 6/18/2019 at 10:31 AM

## 2019-06-23 ENCOUNTER — TELEPHONE (OUTPATIENT)
Dept: ENDOCRINOLOGY | Facility: CLINIC | Age: 69
End: 2019-06-23

## 2019-06-23 DIAGNOSIS — E89.0 POST-SURGICAL HYPOTHYROIDISM: ICD-10-CM

## 2019-06-23 DIAGNOSIS — C73 PAPILLARY THYROID CARCINOMA (H): ICD-10-CM

## 2019-06-23 RX ORDER — LEVOTHYROXINE SODIUM 150 UG/1
TABLET ORAL
Qty: 96 TABLET | Refills: 3 | Status: SHIPPED | OUTPATIENT
Start: 2019-06-23 | End: 2020-04-08

## 2019-06-24 NOTE — TELEPHONE ENCOUNTER
call  - please call him and tell him to increase the levothyroxine from 1/2 tablet per WEEK, using the 150 mcg tablets as follows:  MON to SAT 1 tablet/day;  SUN 1.5 tablet    Thanks  Yanira Uribe MD

## 2019-06-24 NOTE — TELEPHONE ENCOUNTER
I spoke with Price daughter Kisha ( phone   [phone]) She took down the new instructions for the levothyroxine  And will  from the pharmacy .   I also went over his new instructions  For  [facility]  inpatient treatment. HE is to start the low iodine diet Wednesday this week.  His first thyrogen injection is  Monday 7/8/19 at 1 PM They did receive this schedule in the mail. Previously he was having this  done sooner so they  were confused.  I gave instructions for while in the  7C room and what it looks like and what can  and can't be brought in including  visitors.     I also went over the 5 day isooations with  Her in great detail. She had not seen anything on these precautions. I will mail this out  so they have it. I heard small children. She tells me he has his own room and own bathroom to use. Sofie Beltran RN on 6/24/2019 at 4:34 PM

## 2019-06-27 ENCOUNTER — TELEPHONE (OUTPATIENT)
Dept: ENDOCRINOLOGY | Facility: CLINIC | Age: 69
End: 2019-06-27

## 2019-06-27 NOTE — TELEPHONE ENCOUNTER
Orders  THYROGEN 2 DOSE, 131 I ABLATION OF RESIDUAL THYROID - INPATIENT PROTOCOL  Phone: [phone]   Fax: [phone]     Diagnosis: 193 (thyroid cancer)     Ordering Provider: Yanira Uribe MD     You should continue taking your usual dose of levothyroxine throughout the schedule noted below     Start low iodine diet < 50 mcg/day.                      Thursday 06/27/19   (low iodine recipes at [email or web address])     Thyrogen 0.9 mg IM Dose # 1                          Wednesday  07/10/ 2019 1:00pm   [facility] [place] clinic     Thyrogen 0.9 mg IM Dose # 2               Thursday 07/11/2019 1:00pm  [facility] [place] [facility]ology clinic     Lab Work ( TSH, CBC with PLT, Thyroglobulin ) Friday 07/12/19  8:00 AM    [facility] [place] 1st [place]  Lab  [address], [place].     Admit for treatment (special room on )   Arrive    Friday 07/12/2019 11:00 AM  This is considered an OBSERVATION for insurance/billing purposes.  Report to 2nd floor of [facility], Admission Area.  Recommend you eat breakfast before coming - you may miss lunch.        Usually discharge is the day after admission, around 12noon, after radioactivity level is demonstrated to be acceptable target level.        Start radiation isolation at home         Immediately after Discharge .  Stop low iodine diet                             Immediately after discharge from hospital  Stop radiation isolation                       Wednesday 07/17/19  Post therapy scan                             Wednesday 07/17/ 19 9:00 AM   Located on the 2nd floor of [facility], Radiology Department

## 2019-06-27 NOTE — TELEPHONE ENCOUNTER
They are trying to reach Quincy to confirm if he is ordering the radiation  quality  control  staff to work Saturday to discharge him or if  he is allowing him to stay in 7C for isolation  until Monday when they return to work to clear him for discharge. I have changed the schedule but will hold on contacting family until  we know for sure . If this change does not work then we will have to schedule him out  a week or two.This is the  tentative schedule:     Orders  THYROGEN 2 DOSE, 131 I ABLATION OF RESIDUAL THYROID - INPATIENT PROTOCOL  Phone: [phone]   Fax: [phone]     Diagnosis: 193 (thyroid cancer)     Ordering Provider: Yanira Uribe MD     You should continue taking your usual dose of levothyroxine throughout the schedule noted below     Start low iodine diet < 50 mcg/day.                      Thursday 06/27/19   (low iodine recipes at [email or web address])     Thyrogen 0.9 mg IM Dose # 1                          Wednesday  07/10/ 2019 1:00pm   [facility] [place] 3[place] floor Endocrinology clinic     Thyrogen 0.9 mg IM Dose # 2               Thursday 07/11/2019 1:00pm  [facility] [place] [facility]inology clinic     Lab Work ( TSH, CBC with PLT, Thyroglobulin ) Friday 07/12/19  8:00 AM    [facility] [place] 1st floor  Lab  [address] [place], [place].     Admit for treatment (special room on 7C)   Arrive    Friday 07/12/2019 11:00 AM  This is considered an OBSERVATION for insurance/billing purposes.  Report to 2nd floor of [facility], Admission Area.  Recommend you eat breakfast before coming - you may miss lunch.        Usually discharge is the day after admission, around 12noon, after radioactivity level is demonstrated to be acceptable target level.        Start radiation isolation at home         Immediately after Discharge .  Stop low iodine diet                             Immediately after discharge from hospital  Stop radiation isolation                       Wednesday 07/17/19  Post therapy scan                              Wednesday 07/17/ 19 9:00 AM     Located on the 2nd floor of [facility], Radiology Department

## 2019-06-27 NOTE — TELEPHONE ENCOUNTER
He cannot get the  I131 by that day .  Theres no room around it the dates have to be changed. Jolynn C tells me they can  have a room on a Friday 7/12/19 .  He would have his  Thyrogen injections changed to   7/10 Wednesday and 7/11/19 Thursday .  Lab Friday AM 7/12/19  then Admit 7C at 11:00  and Post  therapy scan  Wednesday 7/17/19 .   Does that meet your approval ?     I already  changed the dates per [place]  request  for therapy and post therapy scan.  Mitzi will secure admit 7C    I will  reschedule the  Injections and  Labs and dates on the labs.   If you can't be there Friday 7/12/19 at 11:00  then we may have to arrange another provider to see him ? Sofie Beltran RN on 6/27/2019 at 4:06 PM

## 2019-06-27 NOTE — TELEPHONE ENCOUNTER
Are you saying they can't get the 131I ? Is there a shortage?   I need a better understanding of why nuclear medicine is asking to change dates before we start to work on the details.    Yanira Uribe MD

## 2019-06-27 NOTE — TELEPHONE ENCOUNTER
This remains very fishy. I called nuclear medicine and learned that there is an issue with the nuclear reactor that makes the radioactive iodine being down for 2 weeks.  In the past we have never been able to treat on a Friday as an inpatient because radiation safety is unwilling to work on weekends and they need to clear them for discharge.  Before we do a lot more work lets wait to hear confirmation that Radiation safety isn't going to be an issue with a Friday treatment date.  Hopefully they will get back to us with more tomorrow morning.   Yanira Uribe MD

## 2019-06-27 NOTE — TELEPHONE ENCOUNTER
Post therapy scan time changed from  9 PM to 9 AM . Will wait for confirmation from  [place] in [place]  for admit . I don't think that  an admit had been requested to set up  for the  original date  so Quincy may not have been aware of this . It is  quite possible that we  will need to change this to another  week Sofie Beltran, RN on 6/27/2019 at 5:12 PM

## 2019-06-27 NOTE — TELEPHONE ENCOUNTER
Change in plan per Nuclear Medicine : Per Quincy states Pt is needing to have both Nurse visits moved out for 7/10/2019 and 7/11/2019 and the lab appointment moved to the 7/12/2019. Due to the supplies for the Ablation Therapy with his clinic will not have the neccessary equipment ready for the apt on 7/10/2019. Quincy had contacted the Pts daughter and she was okay and understood, Pts daughter is wanting to get a call back in regards to those apts the new day and time.      Treatment  was changed to  7/12 /19 a Friday but I am not  sure if  7 C will do a Friday Admit  but  will check. The post therapy scan  Is moved to  7/17/19 if  the changes  can be made. If he cannot get an Admit on 7 C of a Friday  we will have to  cancel and rearrange another week.   Quincy  Said he contacted the family and they are ok with it as long as all  the changes can be made. He said he contacted our  clinic and someone was working on it but I do not see who that person is.   I will need to change the injections and lab work dates and make sure an admit can be done on a Friday. Dr Uribe  also needs to be available   7/12 /19 a Friday for the admit dose. Please advice Sofie Beltran RN on 6/27/2019 at 3:42 PM

## 2019-06-28 ENCOUNTER — TELEPHONE (OUTPATIENT)
Dept: ENDOCRINOLOGY | Facility: CLINIC | Age: 69
End: 2019-06-28

## 2019-06-28 NOTE — TELEPHONE ENCOUNTER
Spoke w/ Pts daughter, apologized for the reschedule request to the week of July 15, 2019 per provider request.   Spoke w/ Saul at [facility] moved to 07/17/2019 Wed for in-patient I-131 at 11:00am 7C treatment and post therapy scan Monday 07/22/2019 at 10:00am.    PLEASE HELP SCHEDULE THE FOLLOWING APPT(S): reserve 7C, lab and nurse visits  TIME FRAME NEEDED BY: week of July 15, 2019   SCHEDULING COMMENTS (optional): per Dr Uribe, Pt needs Japanese speaking .  7C, lab and nurse visits requests sent to clinic coordinators for scheduling.   Keshia Colunga RN on 6/28/2019 at 10:57 AM     Orders  THYROGEN 2 DOSE, 131 I ABLATION OF RESIDUAL THYROID - INPATIENT PROTOCOL  Phone: [phone]   Fax: [phone]     Diagnosis: 193 (thyroid cancer)     Ordering Provider: Yanira Uribe MD     You should continue taking your usual dose of levothyroxine throughout the schedule noted below     Start low iodine diet < 50 mcg/day.                    Wed 07/03/2019   (low iodine recipes at [email or web address])     Thyrogen 0.9 mg IM Dose # 1                               Mon 07/15/2019 1:00pm  [facility] [place]     Thyrogen 0.9 mg IM Dose # 2                               Tue 07/16/2019 1:00pm  [facility] [place]     Lab Work ( TSH, CBC with PLT, Thyroglobulin ) Friday 07/17/19  8:00 AM    [facility] [place]  Lab  [address], [place].     Admit for treatment (special room on 7C)   Arrive    Wed 07/17/2019 11:00 AM  This is considered an OBSERVATION for insurance/billing purposes.  Report to 2nd floor of [facility], Admission Area.  Recommend you eat breakfast before coming - you may miss lunch.      Usually discharge is the day after admission, around 12noon, after radioactivity level is demonstrated to be acceptable target level.        Start radiation isolation at home        Immediately after Discharge .  Stop low iodine  diet                             Immediately after discharge from hospital  Stop radiation isolation                        Mon 07/22/19  Post therapy scan                             Mon 07/22/ 19 10:00 AM   Located on the 2nd floor of [facility], Radiology Department

## 2019-06-28 NOTE — TELEPHONE ENCOUNTER
Why can't they reschedule to week of 7/15?  I am not working the weekend when there is not a good reason for this to be necessary.  When an they do it?  I want to treat on a Thursday .

## 2019-06-28 NOTE — TELEPHONE ENCOUNTER
I used to write and order for Thyrogen, which had to be local printed, and then ultimately the Thyrogen was administered in the clinic.  Since the upgrade, I believe there is some new way of writing the orders for medications that are administered in the clinic, but I do not know how to write them.  Do you? If none of use knows how to write them, we need a tutorial so we can teach each other.   Yanira Uribe MD

## 2019-06-28 NOTE — TELEPHONE ENCOUNTER
I am attaching new schedule with treatment day being Thursday (not Wednesday). Please confirm we can get the 7C isolation room for 7/18.  Make sure the Thyrogen dates are changed to 7/16 and 7/17-- I don't know how to get the facility administered orders placed for this . Get the times for the Thyrogen shots and 7/23 post therapy scan entered on the schedule and get him the new schedule.             THYROGEN 2 DOSE, 131 I ABLATION OF RESIDUAL THYROID - INPATIENT PROTOCOL  Phone: [phone]   Fax: [phone]     Diagnosis: 193 (thyroid cancer)     Ordering Provider: Yanira Uribe MD     You should continue taking your usual dose of levothyroxine throughout the schedule noted below     Start low iodine diet < 50 mcg/day.                                         Wednesday 7/3/19  (low iodine recipes at [email or web address])     Thyrogen 0.9 mg IM Dose # 1                                            Tuesday afternoon 7/16/19  [facility] [place]     Thyrogen 0.9 mg IM Dose # 2                                     Wednesdsay afternoon 7/17/19  [facility] [place]     Lab Work                                                              Before 8:30 AM, Thursday 7/18/19        Admit for treatment (special room on 7C)                             11 AM Thursday 7/18/19  This is considered an OBSERVATION for insurance/billing purposes.  Report to 2nd floor of [facility], Admission Area.  Recommend you eat breakfast before coming - you may miss lunch.        Usually discharge is the day after admission, around noon, after radioactivity level is demonstrated to be acceptable target level.        Start radiation isolation at home                                            Immediately after Discharge .7/19/19  Stop low iodine diet                                             Immediately after discharge from hospital  Stop radiation isolation                                                                             Tuesdsay, 7/23/19  Post therapy scan                                                                                     Tuesday, 7/23/19     Located on the 2nd floor of [facility], Radiology Department     _______________________________________     In order to minimize exposure to radiation to others from the radioiodine inside your body, you should do the following for Four days after discharge, ending 7/15/19     Sleep alone.  Kissing and sexual intercourse should be avoided  Avoid prolonged close contact with young children and pregnant women.  Wash your hands with soap and water after each visit to the bathroom  Flush the toilet 2 times after each use  Rinse the bathroom sink and tub after use  Drink plenty of liquids to assist in the removal of radioactive material that are circulating in the blood stream  Use separate eating utensils and wash them separately  Use separate towels and wash cloths and wash them separately to avoid cross contamination.  Avoid public transportation

## 2019-06-28 NOTE — TELEPHONE ENCOUNTER
Spoke w/ Pts daughter, Kisha, via Luxembourger speaking , provided new schedule and phone number for interpretation line to call with questions or concerns. Will mail new schedule via [facility].  Kisha confirmed changes and understanding.   Keshia Colunga RN on 6/28/2019 at 10:33 AM

## 2019-06-28 NOTE — TELEPHONE ENCOUNTER
On second thought, I prefer this happen the next week.  Otherwise, this is going to force ME to work on Saturday 7/12 and I am also on call working the next weekend.  I would like to change the treatment dates to the week of 7/15.    Yanira Uribe MD

## 2019-06-28 NOTE — TELEPHONE ENCOUNTER
Saul from [facility] called to ask orders be updated, current orders  2019 and they cannot reschedule to week of 07/15/2019. Sent to Dr Uribe for update.   Keshia Colunga RN on 2019 at 1:25 PM

## 2019-06-28 NOTE — TELEPHONE ENCOUNTER
I spoke with both genaro and Mahnaz from  [place] and the inpatieint  Treatment for I131 is approved for Friday 7/12/19 . Staff will handle the discharge on Saturday  so this can happen. Dates set, 7 C admit being worked on  by clinic coordinator. Keshia will contact patient  family today with   to go over the  set schedule and a copy mailed outside to arrive within  1-2 days to home address. Sofie Beltran RN on 6/28/2019 at 10:10 AM

## 2019-07-01 NOTE — TELEPHONE ENCOUNTER
Spoke w/ Saul at Nuclear Medicine. Confirmed for 11:00am THU 07/18/2019 Admit to  for treatment, 10:00am TUE 07/23/2019 for scan. Sent to clinic coordinators for scheduling for Nurse visit/thyrogen injections on 07/16/2019 and 07/17/2019 afternoon, Lab 07/18/2019 any time before 08:30am, reserve room  for admit 07/18/2019 Thursday. Pt will need Bruneian speaking .   Keshia Colunga RN on 7/1/2019 at 10:30 AM

## 2019-07-02 NOTE — TELEPHONE ENCOUNTER
Please make sure the patient and his family understand that the ADMISSION time is 11 AM, not 1 PM. These schedules often confuse patients and the inpatient schedule ideally isn't given to them.  Yanira Uribe MD

## 2019-07-02 NOTE — TELEPHONE ENCOUNTER
Kisha, Pts daughter confirms new schedule but asks to review the diet. Reviewed low iodine diet. New schedule mailed via [employer].

## 2019-07-03 ENCOUNTER — TELEPHONE (OUTPATIENT)
Dept: ENDOCRINOLOGY | Facility: CLINIC | Age: 69
End: 2019-07-03

## 2019-07-03 DIAGNOSIS — C73 MALIGNANT NEOPLASM OF THYROID GLAND (H): Primary | ICD-10-CM

## 2019-07-12 ENCOUNTER — TELEPHONE (OUTPATIENT)
Dept: ENDOCRINOLOGY | Facility: CLINIC | Age: 69
End: 2019-07-12

## 2019-07-12 DIAGNOSIS — C73 MALIGNANT NEOPLASM OF THYROID GLAND (H): Primary | ICD-10-CM

## 2019-07-12 NOTE — TELEPHONE ENCOUNTER
Thyrogen order updated to  0.9 mg in Ambulatory orders. Incorrect order  Was dcstephenie. Sofie Beltran RN on 7/12/2019 at 7:50 AM

## 2019-07-12 NOTE — TELEPHONE ENCOUNTER
----- Message from Keshia Colunga RN sent at 7/5/2019  5:16 PM CDT -----  Regarding: thyrogen dose  Since I won't be here this week  - Analilia and I were changing the date on the thyrogen order and there are 2 options, 0.9 or 1.1 and if you look at Dr Uribe's order, it was difficult to determine which she wanted. Can you look and see what you think? Thank you. He's coming in Tuesday July 16 for his first injection. Thanks, again.

## 2019-07-16 ENCOUNTER — ALLIED HEALTH/NURSE VISIT (OUTPATIENT)
Dept: ENDOCRINOLOGY | Facility: CLINIC | Age: 69
End: 2019-07-16
Payer: COMMERCIAL

## 2019-07-16 ENCOUNTER — TELEPHONE (OUTPATIENT)
Dept: ENDOCRINOLOGY | Facility: CLINIC | Age: 69
End: 2019-07-16

## 2019-07-16 DIAGNOSIS — C73 MALIGNANT NEOPLASM OF THYROID GLAND (H): Primary | ICD-10-CM

## 2019-07-16 NOTE — TELEPHONE ENCOUNTER
Confirmed with Kisha. Via Singaporean speaking  to confirm they are coming in for Nurse visit today at 1:00pm.  Keshia Colunga RN on 7/16/2019 at 11:53 AM

## 2019-07-16 NOTE — NURSING NOTE
The following medication was given:     MEDICATION: Thyrogen 0.9 mg/mL  ROUTE: IM  SITE: [facility]  DOSE: 1.2  LOT #: [id number]  :  Displair  EXPIRATION DATE:  04/2020  NDC#: [address]-2   Reginaldo Howard CMA\

## 2019-07-17 ENCOUNTER — TELEPHONE (OUTPATIENT)
Dept: ENDOCRINOLOGY | Facility: CLINIC | Age: 69
End: 2019-07-17

## 2019-07-17 ENCOUNTER — ALLIED HEALTH/NURSE VISIT (OUTPATIENT)
Dept: ENDOCRINOLOGY | Facility: CLINIC | Age: 69
End: 2019-07-17
Payer: COMMERCIAL

## 2019-07-17 DIAGNOSIS — C73 MALIGNANT NEOPLASM OF THYROID GLAND (H): Primary | ICD-10-CM

## 2019-07-17 NOTE — NURSING NOTE
The following medication was given:     MEDICATION: Thyrogen 0.9 mg/mL  ROUTE: IM  SITE: Vastus Lateralis - Left  DOSE: 1.2  LOT #: [id number]  :  Vibrynt   EXPIRATION DATE:  04/01/2020  NDC#: [address]-0  Reginaldo Howard CMA

## 2019-07-17 NOTE — TELEPHONE ENCOUNTER
Patient is here for his second Thyrogen 0.9 mg/mL injection. He is asking whether he needs to continue his levothyroxine treatment or not. Will contact his primary endocrinologist for clarification.     Patient advised to continue with his current dose of levothyroxine.

## 2019-07-18 ENCOUNTER — HOSPITAL ENCOUNTER (OUTPATIENT)
Facility: CLINIC | Age: 69
Setting detail: OBSERVATION
Discharge: HOME | End: 2019-07-19
Payer: COMMERCIAL

## 2019-07-18 ENCOUNTER — APPOINTMENT (OUTPATIENT)
Dept: NUCLEAR MEDICINE | Facility: CLINIC | Age: 69
End: 2019-07-18
Payer: COMMERCIAL

## 2019-07-18 ENCOUNTER — OFFICE VISIT (OUTPATIENT)
Dept: INTERPRETER SERVICES | Facility: CLINIC | Age: 69
End: 2019-07-18
Payer: COMMERCIAL

## 2019-07-18 VITALS
SYSTOLIC BLOOD PRESSURE: 151 MMHG | DIASTOLIC BLOOD PRESSURE: 80 MMHG | TEMPERATURE: 97.8 F | HEART RATE: 69 BPM | RESPIRATION RATE: 16 BRPM | WEIGHT: 166.9 LBS | OXYGEN SATURATION: 94 % | BODY MASS INDEX: 28.21 KG/M2

## 2019-07-18 DIAGNOSIS — C73 PAPILLARY THYROID CARCINOMA (H): ICD-10-CM

## 2019-07-18 DIAGNOSIS — E89.0 POST-SURGICAL HYPOTHYROIDISM: ICD-10-CM

## 2019-07-18 DIAGNOSIS — E83.51 HYPOCALCEMIA: ICD-10-CM

## 2019-07-18 LAB
ERYTHROCYTE [DISTWIDTH] IN BLOOD BY AUTOMATED COUNT: 12.1 % (ref 10–15)
HCT VFR BLD AUTO: 45.6 % (ref 40–53)
HGB BLD-MCNC: 15.4 G/DL (ref 13.3–17.7)
MCH RBC QN AUTO: 32.1 PG (ref 26.5–33)
MCHC RBC AUTO-ENTMCNC: 33.8 G/DL (ref 31.5–36.5)
MCV RBC AUTO: 95 FL (ref 78–100)
PLATELET # BLD AUTO: 123 10E9/L (ref 150–450)
RBC # BLD AUTO: 4.8 10E12/L (ref 4.4–5.9)
TSH SERPL DL<=0.005 MIU/L-ACNC: 198.13 MU/L (ref 0.4–4)
WBC # BLD AUTO: 5.5 10E9/L (ref 4–11)

## 2019-07-18 PROCEDURE — 79005 NUCLEAR RX ORAL ADMIN: CPT

## 2019-07-18 PROCEDURE — 25000132 ZZH RX MED GY IP 250 OP 250 PS 637

## 2019-07-18 PROCEDURE — 34400006 ZZH RX 344

## 2019-07-18 PROCEDURE — A9517 I131 IODIDE CAP, RX: HCPCS

## 2019-07-18 PROCEDURE — T1013 SIGN LANG/ORAL INTERPRETER: HCPCS | Mod: U3

## 2019-07-18 PROCEDURE — G0378 HOSPITAL OBSERVATION PER HR: HCPCS

## 2019-07-18 RX ORDER — LOSARTAN POTASSIUM 50 MG/1
50 TABLET ORAL DAILY
Status: DISCONTINUED | OUTPATIENT
Start: 2019-07-18 | End: 2019-07-19 | Stop reason: HOSPADM

## 2019-07-18 RX ORDER — ONDANSETRON 4 MG/1
4 TABLET, FILM COATED ORAL EVERY 6 HOURS PRN
Status: DISCONTINUED | OUTPATIENT
Start: 2019-07-18 | End: 2019-07-19 | Stop reason: HOSPADM

## 2019-07-18 RX ADMIN — Medication 153 MCI.: at 14:16

## 2019-07-18 RX ADMIN — LOSARTAN POTASSIUM 50 MG: 50 TABLET ORAL at 15:27

## 2019-07-18 RX ADMIN — OMEPRAZOLE 20 MG: 20 CAPSULE, DELAYED RELEASE ORAL at 15:27

## 2019-07-18 NOTE — PROGRESS NOTES
Observation goals      1.  Observe for radiation safety compliance and understanding of restrictions after discharge -Ongoing. Dosed at 1400.  2.  Observe for low  Iodine diet compliance-MET. Pt. Med with nutrition, received low iodine menu in Irish.    3.  Observe for early radiation side effects-Ongoing. Dosed at 1400.

## 2019-07-18 NOTE — PLAN OF CARE
Patient admitted for I-131 treatment, dosed at 1400. Can eat at 1500, up ad augusto. Patient is Azerbaijani speaking,  interpretor at bedside for admission and dosing.Denies pain.

## 2019-07-18 NOTE — H&P
ATTENDING ADMISSION NOTE    Papillary thyroid carcinoma (H)  mulfifocal, bilateral, subcm primary (4 foci, TTD not possible from path report data),17 LN + with bulky size (up to 4.4 cm) and TOR.  He has been treated with total Tx, CND and left LND.   pT1a, pN1b, pMx, cMx, stage 2 minimum  MACIS 5.72 assuming complete resection and no distant mets  HOWARD recurrence risk HIGH due to # of involved LN, size of involved LN, TOR  Set up 2 dose thyrogen stimulated 131I adjuvant therapy - Thyrogen stimulated 150 mCi today  (dose  because of the bulky adenopathy with TOR)  Admit for the lst 24 hour after treatment due to the 3 year old at home.  radiation safety   Low iodine diet .      Post-surgical hypothyroidism  On LT4 150 * 7.5/day since 6/23/19- he took it this AM.      HTN - he did not take his usual BP medication today so I will order it.    GERD- he is on omeprazole and didn't take it today so I will order it.    Yanira Uribe MD      Chief complaint/ HISTORY OF PRESENT ILLNESS  Price presents for adjuvant 131I therapy for papillary thyroid carcinoma. This had presented with a palpable left neck mass     His treatment has been as follows:   4/25/19 total thyroidectomy, left central and modified radical neck dissection levels 2-4.  Right upper parathyroid autotx into left SCM (Dr Yunier Rodriguez) removing  papillary thyroid carcinoma, bilateral, largest 0.4 cm left (total tumor diameter not possible from path report), + LN 17/55 (12/26 CND, largest tumor focus 0.4 cm) with + TOR, largest tumor met 4.4 cm left level 2A, but tumor also in level 3 and 4 LNs    He has been treated with thyrogen 0.9 mg IM daily on 7/16 and 7/17 in preparation for the therapy today . Labs were obtained this AM     We have the following labs  4/8/19: Tg 8.1, HOWARD < 0.4,  TSH 1.2  4/25/19 Ca 8.4, PTH 28  4/26/19 Ca 7.8, creatinine 0.71  5/8/19 TSH 2.88, Ca 8.9, phos 3.3, creatinine 0.65, urine iodine 261 mg/g creatinine  6/5/19  TSH 5.5,  free T4 1.03, Ca 8.7, phos 2.7, creatinine 0.72; Tg < 0.1, HOWARD < 0.4 -         ROS  Lost a few lbs on the low iodine diet  Had nausea 2 days ago but not now; usually has BM daily  10 system ROS otherwise as per the HPI or negative    Past Medical History:   Diagnosis Date     HTN (hypertension)      Papillary thyroid carcinoma (H) 03/25/2019     Post-surgical hypothyroidism 04/25/2019     Past Surgical History:   Procedure Laterality Date     DISSECTION RADICAL NECK MODIFIED N/A 4/25/2019    Procedure: Left Modified Radical Neck Dissection;  Surgeon: Yunier Rodriguez MD;  Location: [facility] OR     THYROIDECTOMY N/A 4/25/2019    Procedure: Total Thyroidectomy, Central Neck Dissection;  Surgeon: Yunier Rodriguez MD;  Location: [facility] OR     Prior to Admission medications    Medication Sig Start Date End Date Taking? Authorizing Provider   acetaminophen (TYLENOL) 325 MG tablet Take 325-650 mg by mouth every 6 hours as needed for mild pain    Reported, Patient   calcium carbonate (TUMS) 500 MG chewable tablet Take 1 tablet (500 mg) by mouth daily 6/7/19   Yanira Uribe MD   levothyroxine (SYNTHROID/LEVOTHROID) 150 MCG tablet MON to SAT 1 tablet/day;SUN 1.5 tablet 6/23/19   Yanira Uribe MD   mineral oil-hydrophilic petrolatum (AQUAPHOR) external ointment Apply topically every 8 hours Apply to neck incision three times daily 4/27/19   Dacia Cordova PA-C   omeprazole (PRILOSEC) 20 MG DR capsule Take 1 capsule (20 mg) by mouth daily 4/1/19   Michelle Menendez MD   OMEPRAZOLE PO Take 20 mg by mouth    Reported, Patient   senna-docusate (SENOKOT-S/PERICOLACE) 8.6-50 MG tablet Take 1 tablet by mouth 2 times daily 4/26/19   Dacia Cordova PA-C   telmisartan (MICARDIS) 40 MG tablet Take 1 tablet (40 mg) by mouth daily 4/1/19   Michelle Menendez MD   UNABLE TO FIND 20 mg MEDICATION NAME: elisabeth    Reported, Patient       Family History   Problem Relation Age of Onset     Hypertension Mother      Diabetes Brother       Thyroid Disease No family hx of      Thyroid Cancer No family hx of      Cancer No family hx of      Social History     Tobacco Use     Smoking status: Former Smoker     Smokeless tobacco: Never Used   Substance Use Topics     Alcohol use: Yes     Alcohol/week: 2.4 oz     Types: 4 Cans of beer per week     Frequency: Monthly or less     Comment: once in awhile     Drug use: No     EXAM:   /80 (BP Location: Right arm)   Pulse 69   Temp 97.8  F (36.6  C) (Oral)   Resp 16   Wt 75.7 kg (166 lb 14.4 oz)   SpO2 94%   BMI 28.21 kg/m    GENERAL :  Pleasant man In no apparent distress. His daughter and another woman are present  SKIN: Normal color, normal temperature, texture.   EYES: no eye contact; PER, No scleral icterus,    NECK: healed surgical scar low transverse and left lateral neck.. The left lateral scar is thick but doesn't feel like a mass  RESP: Lungs clear to auscultation bilaterally  CARDIAC: Regular rate and rhythm, normal S1 S2, without murmurs, rubs or gallops    NEURO: awake, alert, responds appropriately to questions.  Moves all extremities.  No tremor of the outstretched hand.    EXTREMITIES: No clubbing, cyanosis or edema.     Results for LIAN CAMPOSRAVI AAKASH (MRN [id number]) as of 7/18/2019 13:45   Ref. Range 7/18/2019 07:47   TSH Latest Ref Range: 0.40 - 4.00 mU/L 198.13 (H)   WBC Latest Ref Range: 4.0 - 11.0 10e9/L 5.5   Hemoglobin Latest Ref Range: 13.3 - 17.7 g/dL 15.4   Hematocrit Latest Ref Range: 40.0 - 53.0 % 45.6   Platelet Count Latest Ref Range: 150 - 450 10e9/L 123 (L)   RBC Count Latest Ref Range: 4.4 - 5.9 10e12/L 4.80   MCV Latest Ref Range: 78 - 100 fl 95   MCH Latest Ref Range: 26.5 - 33.0 pg 32.1   MCHC Latest Ref Range: 31.5 - 36.5 g/dL 33.8   RDW Latest Ref Range: 10.0 - 15.0 % 12.1

## 2019-07-19 PROBLEM — E83.51 HYPOCALCEMIA: Status: RESOLVED | Noted: 2019-05-08 | Resolved: 2019-07-19

## 2019-07-19 PROCEDURE — 25000132 ZZH RX MED GY IP 250 OP 250 PS 637

## 2019-07-19 PROCEDURE — G0378 HOSPITAL OBSERVATION PER HR: HCPCS

## 2019-07-19 RX ADMIN — LOSARTAN POTASSIUM 50 MG: 50 TABLET ORAL at 07:54

## 2019-07-19 RX ADMIN — OMEPRAZOLE 20 MG: 20 CAPSULE, DELAYED RELEASE ORAL at 07:55

## 2019-07-19 NOTE — PLAN OF CARE
Observation goals       1.  Observe for radiation safety compliance and understanding of restrictions after discharge -Ongoing. Dosed at 1400.  2.  Observe for low  Iodine diet compliance-MET. Pt. Med with nutrition, received low iodine menu in Finnish.    3.  Observe for early radiation side effects-Ongoing. Dosed at 1400.

## 2019-07-19 NOTE — PLAN OF CARE
Observation goals       1.  Observe for radiation safety compliance and understanding of restrictions after discharge -Ongoing. Dosed at 1400.  2.  Observe for low  Iodine diet compliance-MET. Pt. Med with nutrition, received low iodine menu in Egyptian.    3.  Observe for early radiation side effects-Ongoing. Dosed at 1400.

## 2019-07-19 NOTE — DISCHARGE SUMMARY
Admit 7/18/19   Discharge 7/19/19   Diagnosis : thyroid cancer   Treatment:Thyrogen stimulated  153 mCi 131I  radioactive iodine therapy for thyroid cancer was given on 7/18/19.  Price was discharged to home after he was cleared by radiation safety on 7/19/19.            Discharge meds are the same as prior to admission meds.     Prior to Admission medications    Medication Sig Start Date End Date Taking? Authorizing Provider   levothyroxine (SYNTHROID/LEVOTHROID) 150 MCG tablet MON to SAT 1 tablet/day;SUN 1.5 tablet 6/23/19  Yes Yanira Uribe MD   mineral oil-hydrophilic petrolatum (AQUAPHOR) external ointment Apply topically every 8 hours Apply to neck incision three times daily 4/27/19  Yes Dacia Cordova PA-C   omeprazole (PRILOSEC) 20 MG DR capsule Take 1 capsule (20 mg) by mouth daily 4/1/19  Yes Michelle Menendez MD   senna-docusate (SENOKOT-S/PERICOLACE) 8.6-50 MG tablet Take 1 tablet by mouth 2 times daily 4/26/19  Yes Dacia Cordova PA-C   telmisartan (MICARDIS) 40 MG tablet Take 1 tablet (40 mg) by mouth daily 4/1/19  Yes Michelle Menendez MD   acetaminophen (TYLENOL) 325 MG tablet Take 325-650 mg by mouth every 6 hours as needed for mild pain    Reported, Patient   calcium carbonate (TUMS) 500 MG chewable tablet Take 1 tablet (500 mg) by mouth daily 6/7/19   Yanira Uribe MD     Discharge diet: regular    Appt:   Post therapy TBS 7/23/19  See me 10/8/19 as already scheduled    Yanira Uribe MD

## 2019-07-19 NOTE — PLAN OF CARE
[id number].  I-131 radiation precautions. Pt UAL in room.  Denies pain, nausea. Encouraged to drink large amt of liquids. Denies pain.

## 2019-07-19 NOTE — PROGRESS NOTES
Endocrine service note:    Assessment/plan  1.Papillary thyroid carcinoma (H)  mulfifocal, bilateral, subcm primary (4 foci, TTD not possible from path report data),17 LN + with bulky size (up to 4.4 cm) and TOR.  He has been treated with total Tx, CND and left LND.   pT1a, pN1b, pMx, cMx, stage 2 minimum  MACIS 5.72 assuming complete resection and no distant mets  HOWARD recurrence risk HIGH due to # of involved LN, size of involved LN, TOR   2 dose thyrogen stimulated 131I adjuvant therapy 153 mCi   radiation safety until Tuesday 7/23  Low iodine diet ends at discharge     Post-surgical hypothyroidism  On LT4 150 * 7.5/day since 6/23/19-     HTN - the BP med was substituted by the pharmacy.      GERD- on omeprazole     Yanira Uribe MD       Chief complaint/ HPI  Price was admitted yesterday for radioactive iodine therapy for thyroid cancer.  He was treated with 153 mCi on 7/18/19. Today he was seen by me along with the  bot.  He has tolerated the therapy, is reporting no adverse effects.     Active Diet Order  Orders Placed This Encounter      Low Iodine Diet      Diet      ROS  Feels OK  Eating OK - had BF  No nausea; had BM this AM  No pain  Hasn't cleared radiation safety yet - they are returning in a few hours    Exam  /80 (BP Location: Right arm)   Pulse 69   Temp 97.8  F (36.6  C) (Oral)   Resp 16   Wt 75.7 kg (166 lb 14.4 oz)   SpO2 94%   BMI 28.21 kg/m    Up in the room , standing ,  appears baseline, in NAD  Not examined due to radiation safety    DATA     Examination:  [facility] THYROID ABLATION THERAPY I 131  7/18/2019 2:23 PM      Comparison:  Ultrasound 4/10/2019 and CT neck 4/10/2019.     History:  Papillary thyroid carcinoma (H); Post-surgical  hypothyroidism     Additional Information: None.     Procedure: After confirming the identity of the patient by independent  sources, and after consultation with the ordering physician Dr. Uribe the risks and benefits of I-131  thyroid therapy were  discussed at length with the patient, and all questions were answered.  153 mCi of I-131 were given by mouth to the patient.                                                                      Impression: 153 mCi I-131 therapeutic treatment for papillary thyroid  cancer.     I have personally reviewed the examination and initial interpretation  and I agree with the findings.     KOKO MCINTYRE MD

## 2019-07-19 NOTE — PLAN OF CARE
1.  Observe for radiation safety compliance and understanding of restrictions after discharge -Ongoing. Dosed at 1400.Will review with pt at time of discharge,all other information needed for discharge.  2.  Observe for low  Iodine diet compliance-MET. Pt. Met with nutrition, received low iodine menu in English.    3.  Observe for early radiation side effects-Ongoing. Dosed at 1400.No side effects noted.

## 2019-07-19 NOTE — PROGRESS NOTES
Pt and daughter waiting for ride,will leave when family member arrives for transport.All belongings returned to and with pt and daughter.

## 2019-07-19 NOTE — PLAN OF CARE
Pt has been cleared from Nuclear medicine.[facility].Tolerating ordered diet without c/o.Reviewed radiation precautions for discharge.All goals met.Waiting for order for discharge signature.Family is here.Will discharge to home with all belongings.Teach back done re: discharge.Pt states understanding.

## 2019-07-19 NOTE — PLAN OF CARE
Observation goals       1.  Observe for radiation safety compliance and understanding of restrictions after discharge -Ongoing. Dosed at 1400.  2.  Observe for low  Iodine diet compliance-MET. Pt. Med with nutrition, received low iodine menu in [place].    3.  Observe for early radiation side effects-Ongoing. Dosed at 1400.

## 2019-07-23 ENCOUNTER — HOSPITAL ENCOUNTER (OUTPATIENT)
Dept: NUCLEAR MEDICINE | Facility: CLINIC | Age: 69
Setting detail: NUCLEAR MEDICINE
Discharge: HOME | End: 2019-07-23
Payer: COMMERCIAL

## 2019-07-23 DIAGNOSIS — C73 PAPILLARY THYROID CARCINOMA (H): ICD-10-CM

## 2019-07-23 DIAGNOSIS — E89.0 POST-SURGICAL HYPOTHYROIDISM: ICD-10-CM

## 2019-07-23 PROCEDURE — 78099 UNLISTED ENDOCRINE PX DX NUC: CPT

## 2019-07-23 PROCEDURE — T1013 SIGN LANG/ORAL INTERPRETER: HCPCS | Mod: U3

## 2019-07-24 ENCOUNTER — TELEPHONE (OUTPATIENT)
Dept: FAMILY MEDICINE | Facility: CLINIC | Age: 69
End: 2019-07-24

## 2019-07-24 LAB — LAB SCANNED RESULT: NORMAL

## 2019-07-24 NOTE — TELEPHONE ENCOUNTER
Called patient using .  Left message to return phone call to triage.  Kisha Junior RN CPC Triage.

## 2019-07-24 NOTE — TELEPHONE ENCOUNTER
This patient was discharged from [facility] on 07/19/2019.    Discharge Diagnosis: Thyroid Cancer, Papillary Thyroid Carcinoma (H)    Follow-up instructions: None listed in discharge notes    A follow-up visit has not been scheduled.      Please follow-up with patient.

## 2019-07-25 NOTE — TELEPHONE ENCOUNTER
ED / Discharge Outreach Protocol    Patient Contact    Attempt # 2    Was call answered?  No.  Left message using an intepreter on voicemail with information to call me back.  Kisha Junior RN CPC Triage.

## 2019-07-29 NOTE — TELEPHONE ENCOUNTER
ED / Discharge Outreach Protocol    Patient Contact    Attempt # 3    Was call answered?  No.  Left message using  on voicemail with information to call me back.

## 2019-08-05 ENCOUNTER — TELEPHONE (OUTPATIENT)
Dept: ENDOCRINOLOGY | Facility: CLINIC | Age: 69
End: 2019-08-05

## 2019-08-05 NOTE — TELEPHONE ENCOUNTER
Kisha called  asking  when  his next appointment is. He may go to [place] for three weeks. F/u is  10/8/19 with Dr Urieb.  Should he have received something from [place]  to have with in case his I131 sets off alarms?  Or has it been long enough ? Sofie Beltran, RN on 8/5/2019 at 12:22 PM

## 2019-08-05 NOTE — TELEPHONE ENCOUNTER
He is going to take a bus  but family was notified  of the need of a form  From [place]  In case alarms go off at security check points. Sofie Beltran RN on 8/5/2019 at 4:52 PM  .

## 2019-08-06 ENCOUNTER — OFFICE VISIT (OUTPATIENT)
Dept: FAMILY MEDICINE | Facility: CLINIC | Age: 69
End: 2019-08-06
Payer: COMMERCIAL

## 2019-08-06 VITALS
DIASTOLIC BLOOD PRESSURE: 76 MMHG | TEMPERATURE: 98 F | HEART RATE: 62 BPM | WEIGHT: 170 LBS | BODY MASS INDEX: 28.73 KG/M2 | SYSTOLIC BLOOD PRESSURE: 142 MMHG

## 2019-08-06 DIAGNOSIS — C73 MALIGNANT NEOPLASM OF THYROID GLAND (H): Primary | ICD-10-CM

## 2019-08-06 DIAGNOSIS — I10 ESSENTIAL HYPERTENSION: ICD-10-CM

## 2019-08-06 DIAGNOSIS — C73 PAPILLARY THYROID CARCINOMA (H): ICD-10-CM

## 2019-08-06 PROCEDURE — 99214 OFFICE O/P EST MOD 30 MIN: CPT | Performed by: FAMILY MEDICINE

## 2019-08-06 NOTE — PROGRESS NOTES
Subjective     Price Rene is a 68 year old male who presents to clinic today for the following health issues:    [facility]     Hospital Follow-up Visit:    Hospital/Nursing Home/IP Rehab Facility: [facility]  Date of Admission: 7/1819  Date of Discharge: 7/19/19  Reason(s) for Admission: Thyroid Cancer            Problems taking medications regularly:  None       Medication changes since discharge: Se medication list       Problems adhering to non-medication therapy:  None    Summary of hospitalization:  [facility] discharge summary reviewed  Diagnostic Tests/Treatments reviewed.  Follow up needed: patient will be followed by his oncologist   Other Healthcare Providers Involved in Patient s Care:         None  Update since discharge: improved.     Post Discharge Medication Reconciliation: discharge medications reconciled, continue medications without change.  Plan of care communicated with patient and family     Coding guidelines for this visit:  Type of Medical   Decision Making Face-to-Face Visit       within 7 Days of discharge Face-to-Face Visit        within 14 days of discharge   Moderate Complexity [id number]   High Complexity 48690 [id number]              Reviewed and updated as needed this visit by Provider         Review of Systems   ROS COMP: Constitutional, HEENT, cardiovascular, pulmonary, GI, , musculoskeletal, neuro, skin, endocrine and psych systems are negative, except as otherwise noted.      Objective    BP (!) 142/76 (BP Location: Left arm, Patient Position: Sitting, Cuff Size: Adult Regular)   Pulse 62   Temp 98  F (36.7  C) (Oral)   Wt 77.1 kg (170 lb)   BMI 28.73 kg/m    Body mass index is 28.73 kg/m .  Physical Exam   GENERAL: healthy, alert and no distress  EYES: Eyes grossly normal to inspection, PERRL and conjunctivae and sclerae normal  HENT: ear canals and TM's normal, nose and mouth without ulcers or lesions  NECK: no adenopathy, no asymmetry,  masses, or scars and thyroid normal to palpation  NECK: no adenopathy and patient has a scar g going up the left side of his neck.  Where they did removal of the thyroid and I am assuming at least exploration of the left side of the neck.  RESP: lungs clear to auscultation - no rales, rhonchi or wheezes  CV: regular rate and rhythm, normal S1 S2, no S3 or S4, no murmur, click or rub, no peripheral edema and peripheral pulses strong  ABDOMEN: soft, nontender, no hepatosplenomegaly, no masses and bowel sounds normal  MS: no gross musculoskeletal defects noted, no edema  SKIN: no suspicious lesions or rashes  NEURO: Normal strength and tone, mentation intact and speech normal  PSYCH: mentation appears normal, affect normal/bright      ICD-10-CM    1. Malignant neoplasm of thyroid gland (H) C73    2. Essential hypertension I10    3. Papillary thyroid carcinoma (H) C73      Patient states seems stable postop his blood pressure is mildly elevated.  He is currently still in the process of being followed by endocrinology.  His TSH was elevated on his last exam.  This was felt to be normal by the endocrinologist.    Patient plans on being in [place] for the next 3 weeks or so and then will follow-up with endocrinology when he gets back.

## 2019-08-06 NOTE — LETTER
[address] [id number] [phone]        August 6, 2019    Regarding:  Price Rene  [address]  [place] [id number]              To Whom It May Concern;    Patient will be flying to [place].  He recently had radiation therapy with Iodine to treat a thyroid cancer.  He is taking supplemental thyroid medication and a blood pressure pill.    He does not needs any special precautions.      He has a list of his medications with him.                Sincerely,        Kennedy Ponce MD

## 2019-09-05 ENCOUNTER — TELEPHONE (OUTPATIENT)
Dept: FAMILY MEDICINE | Facility: CLINIC | Age: 69
End: 2019-09-05

## 2019-09-05 NOTE — TELEPHONE ENCOUNTER
Erroneous encounter. Patient does not need medication prior Authorization.  Amina Fortune, LAURA on 9/5/2019 at 9:49 AM

## 2019-10-08 ENCOUNTER — OFFICE VISIT (OUTPATIENT)
Dept: ENDOCRINOLOGY | Facility: CLINIC | Age: 69
End: 2019-10-08
Payer: COMMERCIAL

## 2019-10-08 VITALS
SYSTOLIC BLOOD PRESSURE: 176 MMHG | BODY MASS INDEX: 29.37 KG/M2 | WEIGHT: 173.8 LBS | HEART RATE: 63 BPM | DIASTOLIC BLOOD PRESSURE: 96 MMHG

## 2019-10-08 DIAGNOSIS — E89.0 POST-SURGICAL HYPOTHYROIDISM: ICD-10-CM

## 2019-10-08 DIAGNOSIS — C73 PAPILLARY THYROID CARCINOMA (H): ICD-10-CM

## 2019-10-08 DIAGNOSIS — E89.0 POST-SURGICAL HYPOTHYROIDISM: Primary | ICD-10-CM

## 2019-10-08 LAB
CALCIUM SERPL-MCNC: 8.6 MG/DL (ref 8.5–10.1)
T4 FREE SERPL-MCNC: 1.47 NG/DL (ref 0.76–1.46)
TSH SERPL DL<=0.005 MIU/L-ACNC: 0.71 MU/L (ref 0.4–4)

## 2019-10-08 ASSESSMENT — PAIN SCALES - GENERAL: PAINLEVEL: MILD PAIN (3)

## 2019-10-08 NOTE — LETTER
10/8/2019       RE: Price Rene  [address]  [place] [id number]     Dear Colleague,    Thank you for referring your patient, Price Rene, to the [facility] ENDOCRINOLOGY at [facility]. Please see a copy of my visit note below.    Endocrine Consult note    Attending Assessment/Plan :     Papillary thyroid carcinoma (H)  mulfifocal, bilateral, subcm primary (4 foci, TTD not possible from path report data),17 LN + with bulky size (up to 4.4 cm) and TOR.  He has been treated with total Tx, CND and left LND and adjuvant 131I .   pT1a, pN1b, pMx, cMx, stage 2 minimum  MACIS 5.72 assuming complete resection and no distant mets  HOWARD recurrence risk HIGH due to # of involved LN, size of involved LN, TOR  Pre 131I Tg level was not very high: Differential of this includes low thyroid volume vs poor differentiation   .   Post-surgical hypothyroidism  Treat to target TSH < 0.4 due to the thyroid cancer.     He is currently on LT4 150 * 7.0/week despite my past orders.  Labs following appt show TSH 0.71.  Increase LT4 5o 150 * 7.5/week, divided.    Labs on this dose.   USPS      Lung uptake on post therapy TBS from 7/23/19.  No corresponding abnormality on SPECT>  Radiologist believes this was artifact. I am placing on my list so I don't forget about it.       Yanira Uribe MD      Chief complaint/ HISTORY OF PRESENT ILLNESS  Price presents todayfor follow up of thyroid cancer, post surgical hypothyroidism.     Left neck mass FNAB was read as positive for papillary thyroid carcinoma.  Imaging did not clearly show  a thyroid primary .     His treatment has been as follows:   4/25/19 total thyroidectomy, left central and modified radical neck dissection levels 2-4.  Right upper parathyroid autotx into left SCM (Dr Yunier Rodriguez).  I have reviewed operative and surgical pathology reports.  The pathology showed papillary thyroid carcinoma,  bilateral, largest 0.4 cm left (total tumor diameter not possible from path report), + LN 17/55 (12/26 CND, largest tumor focus 0.4 cm) with + TOR, largest tumor met 4.4 cm left level 2A, but tumor also in level 3 and 4 LNs  7/18/19 2 dose thyrogen stimulated 153 mCi 131I.  Today he reports that he had dizziness on 9/15- felt like he was going to fall      We have the following labs  4/8/19: Tg 8.1, LETICIA < 0.4,  TSH 1.2  4/25/19 Ca 8.4, PTH 28  4/26/19 Ca 7.8, creatinine 0.71  5/8/19 TSH 2.88, Ca 8.9, phos 3.3, creatinine 0.65, urine iodine 261 mg/g creatinine  6/5/19 g < 0.1, LETICIA < 0.4 -   TSH 5.5, free T4 1.03, Ca 8.7, phos 2.7, creatinine 0.72; on LT4 150 mcg/day  6/23/19 we increased LT4 to 150 * 7.5/week  7/18/19 Tg 0.74, Leticia < 0.4,  thyrogen stimulated  10/8/19: Tg < 0.5, LETICIA 0.7, TSH 0.71, free T4 1.47, Ca 8.6    I have reviewed images on PACS  3/14/19 CXR negative  4/10/19 thyroid and neck US:   Left superior # 1 0.3  X0.2  X 0.3   Left inferior # 2 0.4 x 0.2  X 0.3 cm   Right level 3 0.8 x0.4 x 1.4 Echogenic hilum  Left level 3 # 1 0.6 x 0.3 x 0.7 cm   Left level 5  Cystic mass 3.2 x 2.3 x 4.1 cm - internal mural area highly suspicious with echogenic foci 1.1 x 1 x 1 cm  4/10/19 CT neck with contrast:   Confirms the US   7/23/19 131I post therapy TBS neck uptake ; I agree with radiologist there is a question of bilateral lung uptake - but there was no corresponding abnormality on SPECT        REVIEW OF SYSTEMS  Lips have burning/ tingling a little - used to be worse  Cardiac; negative  Respiratory: negative  GI: negative  No hand/foot symptoms.     Past Medical History  Past Medical History:   Diagnosis Date     HTN (hypertension)      Papillary thyroid carcinoma (H) 03/25/2019     Post-surgical hypothyroidism 04/25/2019     Past Surgical History:   Procedure Laterality Date     DISSECTION RADICAL NECK MODIFIED N/A 4/25/2019    Procedure: Left Modified Radical Neck Dissection;  Surgeon: Jennifer  Yunier WILLINGHAM MD;  Location: [facility] OR     THYROIDECTOMY N/A 4/25/2019    Procedure: Total Thyroidectomy, Central Neck Dissection;  Surgeon: Yunier Rodriguez MD;  Location: [facility] OR       Medications    Current Outpatient Medications   Medication Sig Dispense Refill     levothyroxine (SYNTHROID/LEVOTHROID) 150 MCG tablet MON to SAT 1 tablet/day;SUN 1.5 tablet 96 tablet 3     telmisartan (MICARDIS) 40 MG tablet Take 1 tablet (40 mg) by mouth daily 90 tablet 3     acetaminophen (TYLENOL) 325 MG tablet Take 325-650 mg by mouth every 6 hours as needed for mild pain       calcium carbonate (TUMS) 500 MG chewable tablet Take 1 tablet (500 mg) by mouth daily (Patient not taking: Reported on 8/6/2019) 84 tablet 0     mineral oil-hydrophilic petrolatum (AQUAPHOR) external ointment Apply topically every 8 hours Apply to neck incision three times daily (Patient not taking: Reported on 8/6/2019) 50 g 1     omeprazole (PRILOSEC) 20 MG DR capsule Take 1 capsule (20 mg) by mouth daily (Patient not taking: Reported on 8/6/2019) 90 capsule 3     senna-docusate (SENOKOT-S/PERICOLACE) 8.6-50 MG tablet Take 1 tablet by mouth 2 times daily (Patient not taking: Reported on 8/6/2019) 30 tablet 0     He denies taking calcium  He doesn't knwo his LT4 dose. He has bottle in his pocket - he confirms he is not taking 7.5/week but rather he is taking one/day.    Allergies  Allergies   Allergen Reactions     Penicillins Hives     Family History  family history includes Diabetes in his brother; Hypertension in his mother.    Social History  Social History     Tobacco Use     Smoking status: Former Smoker     Smokeless tobacco: Never Used   Substance Use Topics     Alcohol use: Yes     Alcohol/week: 4.0 standard drinks     Types: 4 Cans of beer per week     Frequency: Monthly or less     Comment: once in awhile     Drug use: No     He is living with his daughter; Daughter and 2 kids (15 and 3 years of age).      Physical Exam  BP (!) 176/96   Pulse 63    Wt 78.8 kg (173 lb 12.8 oz)   BMI 29.37 kg/m     Body mass index is 29.37 kg/m .  GENERAL :  Pleasant man In no apparent distress  SKIN: Normal color, normal temperature, texture.   EYES: PER, No scleral icterus,  No proptosis, conjunctival redness, stare, retraction  NECK: healed surgical scar low transverse and left lateral neck..   RESP: Lungs clear to auscultation bilaterally  CARDIAC: Regular rate and rhythm, normal S1 S2, without murmurs, rubs or gallops    NEURO: awake, alert, responds appropriately to questions.  Moves all extremities.  No tremor of the outstretched hand.    EXTREMITIES: No clubbing, cyanosis or edema.    DATA REVIEW    Results for AAKASH BUCIO (MRN [id number]) as of 10/24/2019 18:35   Ref. Range 10/8/2019 16:38   Calcium Latest Ref Range: 8.5 - 10.1 mg/dL 8.6   T4 Free Latest Ref Range: 0.76 - 1.46 ng/dL 1.47 (H)   TSH Latest Ref Range: 0.40 - 4.00 mU/L 0.71   Lab Scanned Result Unknown THYROG-Scanned (A)       EXAMINATION: NM THYROID POST ABLATION WB NO IODINE     Nuclear medicine whole body  7 day post therapy I-131scan, on  7/23/2019 11:35 AM.     CLINICAL HISTORY:  Papillary thyroid carcinoma (H); Post-surgical  hypothyroidism      The patient received: 153 mCi I-131 orally.     TECHNIQUE:     The patient came for 5 day post I-131 therapy scan. Whole body images  were done on anterior and posterior projections.      FINDINGS:     Increased tracer uptake in the thyroidectomy bed, right greater than  left. There is intense radiotracer uptake centered over the midline  hyoid with no associated soft tissue mass seen on SPECT-CT. I-131  uptake in the right middle lobe anteriorly adjacent to an area of  atelectasis and in the apical left lower lobe posteriorly without  corresponding CT abnormality. There is increased I-131 uptake in the  rest of the whole body. Normally increased tracer uptake is noted in  the oral cavity, liver, and urinary bladder.                                                                       IMPRESSION:  1. Residual uptake in the thyroidectomy bed, right greater than left,  presumably residual thyroid.    2. Uptake in the lungs without corresponding CT SPECT abnormalities  suggesting this is artifactual.      I have personally reviewed the examination and initial interpretation  and I agree with the findings.     BUD BERNARD MD       Again, thank you for allowing me to participate in the care of your patient.      Sincerely,    Yanira Uribe MD

## 2019-10-08 NOTE — PROGRESS NOTES
Endocrine Consult note    Attending Assessment/Plan :     Papillary thyroid carcinoma (H)  mulfifocal, bilateral, subcm primary (4 foci, TTD not possible from path report data),17 LN + with bulky size (up to 4.4 cm) and TOR.  He has been treated with total Tx, CND and left LND and adjuvant 131I .   pT1a, pN1b, pMx, cMx, stage 2 minimum  MACIS 5.72 assuming complete resection and no distant mets  HOWARD recurrence risk HIGH due to # of involved LN, size of involved LN, TOR  Pre 131I Tg level was not very high: Differential of this includes low thyroid volume vs poor differentiation   .   Post-surgical hypothyroidism  Treat to target TSH < 0.4 due to the thyroid cancer.    He is currently on LT4 150 * 7.0/week despite my past orders.  Labs following appt show TSH 0.71.  Increase LT4 5o 150 * 7.5/week, divided.    Labs on this dose.   USPS      Lung uptake on post therapy TBS from 7/23/19.  No corresponding abnormality on SPECT>  Radiologist believes this was artifact. I am placing on my list so I don't forget about it.       Yanira Uribe MD      Chief complaint/ HISTORY OF PRESENT ILLNESS  Price presents todayfor follow up of thyroid cancer, post surgical hypothyroidism.     Left neck mass FNAB was read as positive for papillary thyroid carcinoma.  Imaging did not clearly show  a thyroid primary .     His treatment has been as follows:   4/25/19 total thyroidectomy, left central and modified radical neck dissection levels 2-4.  Right upper parathyroid autotx into left SCM (Dr Yunier Rodriguez).  I have reviewed operative and surgical pathology reports.  The pathology showed papillary thyroid carcinoma, bilateral, largest 0.4 cm left (total tumor diameter not possible from path report), + LN 17/55 (12/26 CND, largest tumor focus 0.4 cm) with + TOR, largest tumor met 4.4 cm left level 2A, but tumor also in level 3 and 4 LNs  7/18/19 2 dose thyrogen stimulated 153 mCi 131I.  Today he reports that he had dizziness on  9/15- felt like he was going to fall      We have the following labs  4/8/19: Tg 8.1, LETICIA < 0.4,  TSH 1.2  4/25/19 Ca 8.4, PTH 28  4/26/19 Ca 7.8, creatinine 0.71  5/8/19 TSH 2.88, Ca 8.9, phos 3.3, creatinine 0.65, urine iodine 261 mg/g creatinine  6/5/19 g < 0.1, LETICIA < 0.4 -   TSH 5.5, free T4 1.03, Ca 8.7, phos 2.7, creatinine 0.72; on LT4 150 mcg/day  6/23/19 we increased LT4 to 150 * 7.5/week  7/18/19 Tg 0.74, Leticia < 0.4,  thyrogen stimulated  10/8/19: Tg < 0.5, LETICIA 0.7, TSH 0.71, free T4 1.47, Ca 8.6    I have reviewed images on PACS  3/14/19 CXR negative  4/10/19 thyroid and neck US:   Left superior # 1 0.3  X0.2  X 0.3   Left inferior # 2 0.4 x 0.2  X 0.3 cm   Right level 3 0.8 x0.4 x 1.4 Echogenic hilum  Left level 3 # 1 0.6 x 0.3 x 0.7 cm   Left level 5  Cystic mass 3.2 x 2.3 x 4.1 cm - internal mural area highly suspicious with echogenic foci 1.1 x 1 x 1 cm  4/10/19 CT neck with contrast:   Confirms the US   7/23/19 131I post therapy TBS neck uptake ; I agree with radiologist there is a question of bilateral lung uptake - but there was no corresponding abnormality on SPECT        REVIEW OF SYSTEMS  Lips have burning/ tingling a little - used to be worse  Cardiac; negative  Respiratory: negative  GI: negative  No hand/foot symptoms.     Past Medical History  Past Medical History:   Diagnosis Date     HTN (hypertension)      Papillary thyroid carcinoma (H) 03/25/2019     Post-surgical hypothyroidism 04/25/2019     Past Surgical History:   Procedure Laterality Date     DISSECTION RADICAL NECK MODIFIED N/A 4/25/2019    Procedure: Left Modified Radical Neck Dissection;  Surgeon: Yunier Rodriguez MD;  Location: [facility] OR     THYROIDECTOMY N/A 4/25/2019    Procedure: Total Thyroidectomy, Central Neck Dissection;  Surgeon: Yunier Rodriguez MD;  Location: [facility] OR       Medications    Current Outpatient Medications   Medication Sig Dispense Refill     levothyroxine (SYNTHROID/LEVOTHROID) 150 MCG tablet MON to  SAT 1 tablet/day;SUN 1.5 tablet 96 tablet 3     telmisartan (MICARDIS) 40 MG tablet Take 1 tablet (40 mg) by mouth daily 90 tablet 3     acetaminophen (TYLENOL) 325 MG tablet Take 325-650 mg by mouth every 6 hours as needed for mild pain       calcium carbonate (TUMS) 500 MG chewable tablet Take 1 tablet (500 mg) by mouth daily (Patient not taking: Reported on 8/6/2019) 84 tablet 0     mineral oil-hydrophilic petrolatum (AQUAPHOR) external ointment Apply topically every 8 hours Apply to neck incision three times daily (Patient not taking: Reported on 8/6/2019) 50 g 1     omeprazole (PRILOSEC) 20 MG DR capsule Take 1 capsule (20 mg) by mouth daily (Patient not taking: Reported on 8/6/2019) 90 capsule 3     senna-docusate (SENOKOT-S/PERICOLACE) 8.6-50 MG tablet Take 1 tablet by mouth 2 times daily (Patient not taking: Reported on 8/6/2019) 30 tablet 0     He denies taking calcium  He doesn't knwo his LT4 dose. He has bottle in his pocket - he confirms he is not taking 7.5/week but rather he is taking one/day.    Allergies  Allergies   Allergen Reactions     Penicillins Hives     Family History  family history includes Diabetes in his brother; Hypertension in his mother.    Social History  Social History     Tobacco Use     Smoking status: Former Smoker     Smokeless tobacco: Never Used   Substance Use Topics     Alcohol use: Yes     Alcohol/week: 4.0 standard drinks     Types: 4 Cans of beer per week     Frequency: Monthly or less     Comment: once in awhile     Drug use: No     He is living with his daughter; Daughter and 2 kids (15 and 3 years of age).      Physical Exam  BP (!) 176/96   Pulse 63   Wt 78.8 kg (173 lb 12.8 oz)   BMI 29.37 kg/m    Body mass index is 29.37 kg/m .  GENERAL :  Pleasant man In no apparent distress  SKIN: Normal color, normal temperature, texture.   EYES: PER, No scleral icterus,  No proptosis, conjunctival redness, stare, retraction  NECK: healed surgical scar low transverse and left  lateral neck..   RESP: Lungs clear to auscultation bilaterally  CARDIAC: Regular rate and rhythm, normal S1 S2, without murmurs, rubs or gallops    NEURO: awake, alert, responds appropriately to questions.  Moves all extremities.  No tremor of the outstretched hand.    EXTREMITIES: No clubbing, cyanosis or edema.    DATA REVIEW    Results for AAKASH BUCIO (MRN [id number]) as of 10/24/2019 18:35   Ref. Range 10/8/2019 16:38   Calcium Latest Ref Range: 8.5 - 10.1 mg/dL 8.6   T4 Free Latest Ref Range: 0.76 - 1.46 ng/dL 1.47 (H)   TSH Latest Ref Range: 0.40 - 4.00 mU/L 0.71   Lab Scanned Result Unknown THYROG-Scanned (A)       EXAMINATION: NM THYROID POST ABLATION WB NO IODINE     Nuclear medicine whole body  7 day post therapy I-131scan, on  7/23/2019 11:35 AM.     CLINICAL HISTORY:  Papillary thyroid carcinoma (H); Post-surgical  hypothyroidism      The patient received: 153 mCi I-131 orally.     TECHNIQUE:     The patient came for 5 day post I-131 therapy scan. Whole body images  were done on anterior and posterior projections.      FINDINGS:     Increased tracer uptake in the thyroidectomy bed, right greater than  left. There is intense radiotracer uptake centered over the midline  hyoid with no associated soft tissue mass seen on SPECT-CT. I-131  uptake in the right middle lobe anteriorly adjacent to an area of  atelectasis and in the apical left lower lobe posteriorly without  corresponding CT abnormality. There is increased I-131 uptake in the  rest of the whole body. Normally increased tracer uptake is noted in  the oral cavity, liver, and urinary bladder.                                                                      IMPRESSION:  1. Residual uptake in the thyroidectomy bed, right greater than left,  presumably residual thyroid.    2. Uptake in the lungs without corresponding CT SPECT abnormalities  suggesting this is artifactual.      I have personally reviewed the examination and initial  interpretation  and I agree with the findings.     BUD BERNARD MD

## 2019-10-22 LAB — LAB SCANNED RESULT: ABNORMAL

## 2019-10-23 ENCOUNTER — TELEPHONE (OUTPATIENT)
Dept: ENDOCRINOLOGY | Facility: CLINIC | Age: 69
End: 2019-10-23

## 2019-10-23 NOTE — TELEPHONE ENCOUNTER
[facility] Call Center    Phone Message    May a detailed message be left on voicemail: yes    Reason for Call: Requesting Results   Name/type of test: Labs  Date of test: 10/8/19  Was test done at a location other than [facility] (Please fill in the location if not [facility])?: No      Action Taken: Message routed to:  [facility] endocrinology

## 2019-10-24 DIAGNOSIS — E89.0 POST-SURGICAL HYPOTHYROIDISM: Primary | ICD-10-CM

## 2019-10-24 DIAGNOSIS — C73 PAPILLARY THYROID CARCINOMA (H): ICD-10-CM

## 2019-10-24 NOTE — TELEPHONE ENCOUNTER
Please read him the letter that I wrote today.  Increase levothyroxine by 1/2 tablet per week and repeat labs in 2 months.  Yanira Uribe MD

## 2019-10-24 NOTE — TELEPHONE ENCOUNTER
All results are back what can I tell him ? Dose change? Sofie Beltran RN on 10/24/2019 at 2:28 PM

## 2019-10-25 NOTE — TELEPHONE ENCOUNTER
Spoke w/ Pts daughter via [place] speaking  [id number]: Confirmed understanding of rtc appt 04/08/2020 at 3:40pm.   Keshia Colunga RN on 10/25/2019 at 3:53 PM

## 2019-10-25 NOTE — TELEPHONE ENCOUNTER
Spoke w/ Pts daugter via [employer] speaking . Confirmed understanding of dosage instructions for levothyroxine and to get labs drawn in 2 months.   Is asking when Dr Uribe would like to see Pt again.   Sent to provider for recommendation.   No other questions or concerns at this time.   Keshia Colunga RN on 10/25/2019 at 3:29 PM

## 2019-10-25 NOTE — TELEPHONE ENCOUNTER
Correct. The Rx did not officially change.  However, he was not following the orders before.  So, he should now follow the orders.     Yanira Uribe MD

## 2019-10-25 NOTE — TELEPHONE ENCOUNTER
When I saw him on 10/8/19 the recommendation was to RTC in 6 months. This should be easily documented on the record and in fact it appears the appt was already scheduled.  Please convey this to them.  Please review how to find this information on the record if you are unaware of how to see it.    Yanira Uribe MD

## 2019-10-25 NOTE — TELEPHONE ENCOUNTER
"Letter summary:  \"October 24, 2019     Dear Mr.Andrade Rene,     We are writing to inform you of your test results from 10/8/19     The  thyroglobulin is < 0.5 ng/ml with antithyroglobuln antibody 0.7 U/ml.  The antithyroglobulin antibody is something we haven't seen before.      I recommend to increase the levothyroxine from 150 mcg/day to the following:  MON to SAT 1 tablet/day;  SUN 1.5 tablet  You should have labs to follow up on the change in about 2 months.\"    Previous Rx order states from 06/23/2019 states:   levothyroxine (SYNTHROID/LEVOTHROID) 150 MCG tablet 96 tablet 3 6/23/2019  No   Sig: MON to SAT 1 tablet/day;SUN 1.5 tablet     Same dose as previously ordered.   Sent to provider for clarification.   Keshia Colunga RN on 10/25/2019 at 1:22 PM       "

## 2020-01-09 DIAGNOSIS — E89.0 POST-SURGICAL HYPOTHYROIDISM: ICD-10-CM

## 2020-01-09 DIAGNOSIS — C73 PAPILLARY THYROID CARCINOMA (H): ICD-10-CM

## 2020-01-09 LAB
T4 FREE SERPL-MCNC: 1.64 NG/DL (ref 0.76–1.46)
TSH SERPL DL<=0.005 MIU/L-ACNC: <0.01 MU/L (ref 0.4–4)

## 2020-01-10 ENCOUNTER — TELEPHONE (OUTPATIENT)
Dept: ENDOCRINOLOGY | Facility: CLINIC | Age: 70
End: 2020-01-10

## 2020-01-10 NOTE — TELEPHONE ENCOUNTER
"RE: Letter from Dr Uribe of 01/10/2020:\"We are writing to inform you of your thyroid  test results from 1/9/2020. The levels are now as we want them. Please let us know if you have noted any intolerance of this dose change,in particular if you have developed any heart or breathing symptoms with the change.\"    Spoke w/ Pts daughter via [place] speaking . Confirmed understanding of review and recommendations and clinic number to call if symptoms arise.   Keshia Colunga RN on 1/10/2020 at 1:18 PM         "

## 2020-01-22 ENCOUNTER — TELEPHONE (OUTPATIENT)
Dept: FAMILY MEDICINE | Facility: CLINIC | Age: 70
End: 2020-01-22

## 2020-01-22 DIAGNOSIS — R03.0 ELEVATED BLOOD PRESSURE READING WITHOUT DIAGNOSIS OF HYPERTENSION: Primary | ICD-10-CM

## 2020-01-22 NOTE — TELEPHONE ENCOUNTER
Consent to communicate with son and daughter in Epic.    Attempt # 1  Called patient at home number.  Was call answered?  Yes, 10 to 15 minutes after takes the Telmisartan lower back both sides starts hurting. Takes pills in am after breakfast, no trouble urinating or BM      Do you want patient to schedule an appointment? Or change medication?        Carmen Osborn RN  [facility]

## 2020-01-22 NOTE — TELEPHONE ENCOUNTER
Reason for Call:  Other / Med question    Detailed comments: Kisha, patient's daughter called and stated patient is been having a lot of lower back pain and he believes is due to his blood pressure medication telmisartan (MICARDIS).  Kisha also stated patient would like to know if this medication could be changed. She also said they would like to know if patient needs to be seen by the doctor in order to change the medication or if a new script can be sent to [facility] in [place].  Kisha also stated her father does not speak English and she would appreciate a call back to her or a message left on her phone.    Phone Number Patient can be reached at: [phone] (Kisha/patient's daughter)    Best Time: ASAP    Can we leave a detailed message on this number? YES    Call taken on 1/22/2020 at 4:45 PM by Evelin Leyva

## 2020-01-23 RX ORDER — LOSARTAN POTASSIUM 50 MG/1
50 TABLET ORAL DAILY
Qty: 90 TABLET | Refills: 3 | Status: SHIPPED | OUTPATIENT
Start: 2020-01-23

## 2020-04-07 NOTE — PROGRESS NOTES
"Price Rene is a 69 year old male who is being evaluated via a billable telephone visit.      The patient has been notified of following:     \"This telephone visit will be conducted via a call between you and your physician/provider. We have found that certain health care needs can be provided without the need for a physical exam.  This service lets us provide the care you need with a short phone conversation.  If a prescription is necessary we can send it directly to your pharmacy.  If lab work is needed we can place an order for that and you can then stop by our lab to have the test done at a later time.    If during the course of the call the physician/provider feels a telephone visit is not appropriate, you will not be charged for this service.\"     Patient has given verbal consent for Telephone visit?  Yes    Price Rene complains of    Chief Complaint   Patient presents with     Telephone     Return Endocrine       I have reviewed and updated the patient's Past Medical History, Social History, Family History and Medication List.    ALLERGIES  Penicillins    Ashleigh Mai MA    "

## 2020-04-08 ENCOUNTER — VIRTUAL VISIT (OUTPATIENT)
Dept: ENDOCRINOLOGY | Facility: CLINIC | Age: 70
End: 2020-04-08
Payer: MEDICARE

## 2020-04-08 DIAGNOSIS — E89.0 POST-SURGICAL HYPOTHYROIDISM: Primary | ICD-10-CM

## 2020-04-08 DIAGNOSIS — C73 PAPILLARY THYROID CARCINOMA (H): ICD-10-CM

## 2020-04-08 RX ORDER — LEVOTHYROXINE SODIUM 150 UG/1
TABLET ORAL
Qty: 96 TABLET | Refills: 4 | Status: SHIPPED | OUTPATIENT
Start: 2020-04-08 | End: 2020-11-30

## 2020-04-08 RX ORDER — CALCIUM CARBONATE 500 MG/1
1 TABLET, CHEWABLE ORAL DAILY
Qty: 90 TABLET | Refills: 3 | Status: SHIPPED | OUTPATIENT
Start: 2020-04-08 | End: 2020-11-30

## 2020-04-08 NOTE — PROGRESS NOTES
Endocrine Consult note    Attending Assessment/Plan :     Papillary thyroid carcinoma (H)  mulfifocal, bilateral, subcm primary (4 foci, TTD not possible from path report data),17 LN + with bulky size (up to 4.4 cm) and TOR.  He has been treated with total Tx, CND and left LND and adjuvant 131I .   pT1a, pN1b, pMx, cMx, stage 2 minimum  MACIS 5.72 assuming complete resection and no distant mets  HOWARD recurrence risk HIGH due to # of involved LN, size of involved LN, TOR  Pre 131I Tg level was not very high: Differential of this includes low thyroid volume vs poor differentiation   Neck US when safer to do so   .   Post-surgical hypothyroidism  Treat to target TSH < 0.4 due to the thyroid cancer.    He is currently on LT4  150 * 7.5/week, divided.  Refilled.   Labs on this dose when safer to do so  USPS      Lung uptake on post therapy TBS from 7/23/19.  No corresponding abnormality on SPECT>  Radiologist believes this was artifact. I am placing on my list so I don't forget about it.      Questions answered.    Due to the COVID 19 pandemic this visit was converted to a telephone visit in order to help prevent spread of infection in this high risk patient and the general population. The patient gave verbal consent for the telephone visit today.      Start time 1540. Reached Kisha. Eros is not there.  I called back 1545 .  Eros is with her sister and they need  because Kisha needs to go to work;  I called for .  Jose [id number].    Called to alternate phone # [phone], Patient on the line at 1552  Stop time 1603  Total time 13  This visit would have been billed as 04393 an E & M code    Yanira Uribe MD      Chief complaint/ HISTORY OF PRESENT ILLNESS  Eros presents todayfor follow up of thyroid cancer, post surgical hypothyroidism.     Left neck mass FNAB was read as positive for papillary thyroid carcinoma.  Imaging did not clearly show  a thyroid primary .     His treatment  has been as follows:   4/25/19 total thyroidectomy, left central and modified radical neck dissection levels 2-4.  Right upper parathyroid autotx into left SCM (Dr Yunier Rodriguez) removing  papillary thyroid carcinoma, bilateral, largest 0.4 cm left (total tumor diameter not possible from path report), + LN 17/55 (12/26 CND, largest tumor focus 0.4 cm) with + TOR, largest tumor met 4.4 cm left level 2A, but tumor also in level 3 and 4 LNs  7/18/19 2 dose thyrogen stimulated 153 mCi 131I.        We have the following labs  4/8/19: Tg 8.1, LETICIA < 0.4,  TSH 1.2  4/25/19 Ca 8.4, PTH 28  4/26/19 Ca 7.8, creatinine 0.71  5/8/19 TSH 2.88, Ca 8.9, phos 3.3, creatinine 0.65, urine iodine 261 mg/g creatinine  6/5/19 g < 0.1, LETICIA < 0.4 -   TSH 5.5, free T4 1.03, Ca 8.7, phos 2.7, creatinine 0.72; on LT4 150 mcg/day  6/23/19 we increased LT4 to 150 * 7.5/week  7/18/19 Tg 0.74, Leticia < 0.4,  thyrogen stimulated  10/8/19: Tg < 0.5, LETICIA 0.7, TSH 0.71, free T4 1.47, Ca 8.6  1/9/2019: TSH < 0.01, free T4 1.64    I have reviewed images on PACS  3/14/19 CXR negative  4/10/19 thyroid and neck US:   Left superior # 1 0.3  X0.2  X 0.3   Left inferior # 2 0.4 x 0.2  X 0.3 cm   Right level 3 0.8 x0.4 x 1.4 Echogenic hilum  Left level 3 # 1 0.6 x 0.3 x 0.7 cm   Left level 5  Cystic mass 3.2 x 2.3 x 4.1 cm - internal mural area highly suspicious with echogenic foci 1.1 x 1 x 1 cm  4/10/19 CT neck with contrast:   Confirms the US   7/23/19 131I post therapy TBS neck uptake ; question of bilateral lung uptake - but there was no corresponding abnormality on SPECT        REVIEW OF SYSTEMS  Feels good  Energy is OK  Sleep sometimes 5-7 hours;   Passing the time, watching TV  Cardiac: negative  Respiratory: a little cough; negative  GI: a little consstipation  Questions about diet and ETOH    Past Medical History  Past Medical History:   Diagnosis Date     HTN (hypertension)      Papillary thyroid carcinoma (H) 03/25/2019     Post-surgical  hypothyroidism 04/25/2019     Past Surgical History:   Procedure Laterality Date     DISSECTION RADICAL NECK MODIFIED N/A 4/25/2019    Procedure: Left Modified Radical Neck Dissection;  Surgeon: Yunier Rodriguez MD;  Location: [facility] OR     THYROIDECTOMY N/A 4/25/2019    Procedure: Total Thyroidectomy, Central Neck Dissection;  Surgeon: Yunier Rodriguez MD;  Location: [facility] OR     Medications    Current Outpatient Medications   Medication Sig Dispense Refill     acetaminophen (TYLENOL) 325 MG tablet Take 325-650 mg by mouth every 6 hours as needed for mild pain       levothyroxine (SYNTHROID/LEVOTHROID) 150 MCG tablet MON to SAT 1 tablet/day;SUN 1.5 tablet 96 tablet 3     losartan (COZAAR) 50 MG tablet Take 1 tablet (50 mg) by mouth daily 90 tablet 3     calcium carbonate (TUMS) 500 MG chewable tablet Take 1 tablet (500 mg) by mouth daily (Patient not taking: Reported on 8/6/2019) 84 tablet 0     mineral oil-hydrophilic petrolatum (AQUAPHOR) external ointment Apply topically every 8 hours Apply to neck incision three times daily (Patient not taking: Reported on 8/6/2019) 50 g 1     omeprazole (PRILOSEC) 20 MG DR capsule Take 1 capsule (20 mg) by mouth daily (Patient not taking: Reported on 8/6/2019) 90 capsule 3     senna-docusate (SENOKOT-S/PERICOLACE) 8.6-50 MG tablet Take 1 tablet by mouth 2 times daily (Patient not taking: Reported on 8/6/2019) 30 tablet 0       Allergies  Allergies   Allergen Reactions     Penicillins Hives     Family History  family history includes Diabetes in his brother; Hypertension in his mother.    Social History  Social History     Tobacco Use     Smoking status: Former Smoker     Smokeless tobacco: Never Used   Substance Use Topics     Alcohol use: Yes     Alcohol/week: 4.0 standard drinks     Types: 4 Cans of beer per week     Frequency: Monthly or less     Comment: once in awhile     Drug use: No       Physical Exam  There were no vitals taken for this visit.  There is no height or  weight on file to calculate BMI.    DATA REVIEW    Results for LIAN CAMPOSRAVI AAKASH (MRN [id number]) as of 4/8/2020 16:01   Ref. Range 10/8/2019 16:38 1/9/2020 16:22   Calcium Latest Ref Range: 8.5 - 10.1 mg/dL 8.6    T4 Free Latest Ref Range: 0.76 - 1.46 ng/dL 1.47 (H) 1.64 (H)   TSH Latest Ref Range: 0.40 - 4.00 mU/L 0.71 <0.01 (L)   Lab Scanned Result Unknown THYROG-Scanned (A)

## 2020-04-08 NOTE — PATIENT INSTRUCTIONS
Get blood tests and neck ultrasound   when it is safer to go out    (orders are on the system)

## 2020-04-10 ENCOUNTER — TELEPHONE (OUTPATIENT)
Dept: ENDOCRINOLOGY | Facility: CLINIC | Age: 70
End: 2020-04-10

## 2020-04-10 NOTE — TELEPHONE ENCOUNTER
LVM for pt to c/b to schedule October 2020 with Dr Uribe with labs and ultrasound when it's safer to go out post-covid

## 2020-10-21 ENCOUNTER — TELEPHONE (OUTPATIENT)
Dept: ENDOCRINOLOGY | Facility: CLINIC | Age: 70
End: 2020-10-21

## 2020-10-21 DIAGNOSIS — E89.0 POST-SURGICAL HYPOTHYROIDISM: ICD-10-CM

## 2020-10-21 DIAGNOSIS — C73 PAPILLARY THYROID CARCINOMA (H): ICD-10-CM

## 2020-10-21 LAB
T4 FREE SERPL-MCNC: 1.55 NG/DL (ref 0.76–1.46)
TSH SERPL DL<=0.005 MIU/L-ACNC: 0.03 MU/L (ref 0.4–4)

## 2020-10-21 PROCEDURE — 84443 ASSAY THYROID STIM HORMONE: CPT | Performed by: PATHOLOGY

## 2020-10-21 PROCEDURE — 99000 SPECIMEN HANDLING OFFICE-LAB: CPT | Performed by: PATHOLOGY

## 2020-10-21 PROCEDURE — 84432 ASSAY OF THYROGLOBULIN: CPT | Mod: 90 | Performed by: PATHOLOGY

## 2020-10-21 PROCEDURE — 36415 COLL VENOUS BLD VENIPUNCTURE: CPT | Performed by: PATHOLOGY

## 2020-10-21 PROCEDURE — 84439 ASSAY OF FREE THYROXINE: CPT | Performed by: PATHOLOGY

## 2020-10-22 NOTE — TELEPHONE ENCOUNTER
Please help him schedule follow up.  When I saw him in 4/2020 I recommended 6 months follow up. I see nothing is scheduled.  Why is this?  Yanira Uribe MD

## 2020-11-02 ENCOUNTER — APPOINTMENT (OUTPATIENT)
Dept: INTERPRETER SERVICES | Facility: CLINIC | Age: 70
End: 2020-11-02
Payer: MEDICARE

## 2020-11-02 ENCOUNTER — TELEPHONE (OUTPATIENT)
Dept: ENDOCRINOLOGY | Facility: CLINIC | Age: 70
End: 2020-11-02

## 2020-11-02 NOTE — TELEPHONE ENCOUNTER
Wanting lab results of recent ones done. Thyroglobulin still pending. U/S to be done this week. No f/u in October was scheduled  It looks like they left him a message to call and schedule but that didn't happen. Sofie Beltran RN on 11/2/2020 at 3:22 PM

## 2020-11-02 NOTE — TELEPHONE ENCOUNTER
[facility] Call Center    Phone Message    May a detailed message be left on voicemail: yes     Reason for Call: Requesting Results   Name/type of test: labs  Date of test: 10/21/20  Was test done at a location other than [facility] (Please fill in the location if not [facility])?: No      Action Taken: Message routed to:  [facility] ENDO    Travel Screening: Not Applicable

## 2020-11-03 ENCOUNTER — APPOINTMENT (OUTPATIENT)
Dept: INTERPRETER SERVICES | Facility: CLINIC | Age: 70
End: 2020-11-03
Payer: MEDICARE

## 2020-11-03 ENCOUNTER — TELEPHONE (OUTPATIENT)
Dept: ENDOCRINOLOGY | Facility: CLINIC | Age: 70
End: 2020-11-03

## 2020-11-03 NOTE — TELEPHONE ENCOUNTER
Price notified of normal labs and to remain on current dose. He is scheduled for Video visit 11/30/20 with Dr Uribe. Sofie Beltran RN on 11/3/2020 at 2:53 PM

## 2020-11-03 NOTE — TELEPHONE ENCOUNTER
He needs to get on my schedule for follow up or they are going to continue to get pushed back farther and farther.  Why is he not scheduled for a follow up appointment?    The thyroglobulin takes 2 weeks to come in.  I tis not back yet. The thyroid blood levels are otherwise as we want them.  He should continue the current levothyroxine dose.  Yanira Uribe MD

## 2020-11-03 NOTE — TELEPHONE ENCOUNTER
Action Needed --  I've placed a hold that needs scheduling. Please see below.  Department: Endocrine RTN  Provider:Rony   Date/Time:11/30/20 3 PM Video visit   RFV: Thyroid   Patient aware and confirmed please schedule Sofie Beltran RN on 11/3/2020 at 2:49 PM

## 2020-11-04 ENCOUNTER — ANCILLARY PROCEDURE (OUTPATIENT)
Dept: ULTRASOUND IMAGING | Facility: CLINIC | Age: 70
End: 2020-11-04
Payer: MEDICARE

## 2020-11-04 DIAGNOSIS — C73 PAPILLARY THYROID CARCINOMA (H): ICD-10-CM

## 2020-11-04 DIAGNOSIS — E89.0 POST-SURGICAL HYPOTHYROIDISM: ICD-10-CM

## 2020-11-04 LAB — LAB SCANNED RESULT: NORMAL

## 2020-11-04 PROCEDURE — 76536 US EXAM OF HEAD AND NECK: CPT | Performed by: RADIOLOGY

## 2020-11-28 NOTE — PROGRESS NOTES
"Price Rene is a 70 year old male who is being evaluated via a billable video visit.      The patient has been notified of following:     \"This video visit will be conducted via a call between you and your physician/provider. We have found that certain health care needs can be provided without the need for an in-person physical exam.  This service lets us provide the care you need with a video conversation.  If a prescription is necessary we can send it directly to your pharmacy.  If lab work is needed we can place an order for that and you can then stop by our lab to have the test done at a later time.    Video visits are billed at different rates depending on your insurance coverage.  Please reach out to your insurance provider with any questions.    If during the course of the call the physician/provider feels a video visit is not appropriate, you will not be charged for this service.\"    Video-Visit Details    Type of service:  Video Visit  Ashleigh Mai MA    Outcome for 11/28/20 12:45 PM :Left Voicemail for patient to call back          "

## 2020-11-30 ENCOUNTER — VIRTUAL VISIT (OUTPATIENT)
Dept: ENDOCRINOLOGY | Facility: CLINIC | Age: 70
End: 2020-11-30
Payer: MEDICARE

## 2020-11-30 DIAGNOSIS — E89.0 POST-SURGICAL HYPOTHYROIDISM: ICD-10-CM

## 2020-11-30 DIAGNOSIS — C73 PAPILLARY THYROID CARCINOMA (H): Primary | ICD-10-CM

## 2020-11-30 PROCEDURE — 99443 PR PHYSICIAN TELEPHONE EVALUATION 21-30 MIN: CPT | Mod: 95

## 2020-11-30 RX ORDER — LEVOTHYROXINE SODIUM 150 UG/1
TABLET ORAL
Qty: 96 TABLET | Refills: 4 | Status: SHIPPED | OUTPATIENT
Start: 2020-11-30 | End: 2021-06-09

## 2020-11-30 NOTE — PROGRESS NOTES
Endocrine progress note telephone visit due to  video connection problems prohibiting video visit.      Attending Assessment/Plan :     Papillary thyroid carcinoma (H)  mulfifocal, bilateral, subcm primary (4 foci, TTD not possible from path report data),17 LN + with bulky size (up to 4.4 cm) and TOR.  He has been treated with total Tx, CND and left LND and adjuvant 131I .   pT1a, pN1b, pMx, cMx, stage 2 minimum  MACIS 5.72 assuming complete resection and no distant mets  HOWARD recurrence risk HIGH due to # of involved LN, size of involved LN, TOR  Pre 131I Tg level was not very high: Differential of this includes low thyroid volume vs poor differentiation   Watch left level 5 LN     Post-surgical hypothyroidismTreat to target TSH < 0.4 due to the thyroid cancer.    He is currently on LT4  150 * 7.5/week, divided.    USPS      Lung uptake on post therapy TBS from 7/23/19.  No corresponding abnormality on SPECT>  Radiologist believes this was artifact. I am placing on my list so I don't forget about it.      Due to the COVID 19 pandemic this visit was a telephone visit in order to help prevent spread of infection in this high risk patient and the general population. The patient gave verbal consent for the visit today.    Start time  1505 started Multiple attempts to reach  through [employer].  It won't let yo uchoose language.1515   Eventually I sent pat SMS text to connect without .   Stop time 1527  Total time 22 minutes   [id number] as an E & M code    Yanira Uribe MD      Chief complaint/ HISTORY OF PRESENT ILLNESS  Price presents todayfor follow up of thyroid cancer, post surgical hypothyroidism. He was seen by me along with the .     Left neck mass FNAB was read as positive for papillary thyroid carcinoma.  Imaging did not clearly show  a thyroid primary .   Thyroid cancer  treatment has been as follows:   4/25/19 total thyroidectomy, left central and modified  radical neck dissection levels 2-4.  Right upper parathyroid autotx into left SCM (Dr Yunier Rodriguez) removing  papillary thyroid carcinoma, bilateral, largest 0.4 cm left (total tumor diameter not possible from path report), + LN 17/55 (12/26 CND, largest tumor focus 0.4 cm) with + TOR, largest tumor met 4.4 cm left level 2A, but tumor also in level 3 and 4 LNs  7/18/19 2 dose thyrogen stimulated 153 mCi 131I.        We have the following labs  4/8/19: Tg 8.1, LETICIA < 0.4,  TSH 1.2  4/25/19 Ca 8.4, PTH 28  4/26/19 Ca 7.8, creatinine 0.71  5/8/19 TSH 2.88, Ca 8.9, phos 3.3, creatinine 0.65, urine iodine 261 mg/g creatinine  6/5/19 g < 0.1, LETICIA < 0.4 -   TSH 5.5, free T4 1.03, Ca 8.7, phos 2.7, creatinine 0.72; on LT4 150 mcg/day  6/23/19 we increased LT4 to 150 * 7.5/week  7/18/19 Tg 0.74, Leticia < 0.4,  thyrogen stimulated  10/8/19: Tg < 0.5, LETICIA 0.7, TSH 0.71, free T4 1.47, Ca 8.6  1/9/2020 TSH < 0.01, free T4 1.64  10/21/2020 Tg < 0.5, LETICIA < 0.4, TSH 0.03, free T4 1.55    I have reviewed images on PACS  3/14/19 CXR negative  4/10/19 thyroid and neck US:   Left superior # 1 0.3  X0.2  X 0.3   Left inferior # 2 0.4 x 0.2  X 0.3 cm   Right level 3 0.8 x0.4 x 1.4 Echogenic hilum  Left level 3 # 1 0.6 x 0.3 x 0.7 cm   Left level 5  Cystic mass 3.2 x 2.3 x 4.1 cm - internal mural area highly suspicious with echogenic foci 1.1 x 1 x 1 cm  4/10/19 CT neck with contrast:   Confirms the US   7/23/19 131I post therapy TBS neck uptake ; question of bilateral lung uptake - but there was no corresponding abnormality on SPECT   11/4/2020 neck US:   Right level 3 # 1 0.7 x 0.2 x 1.0   Right level 3 # 2 0.6 x 0.3 x 1.1   Left neck level 5 lower : 1 x 0.3 x 0.7 cm        REVIEW OF SYSTEMS  Feels well  Sleep is OK-- later he said he has sleep problem and he asked for sleeping pills.   Weight is stable   Cardiac negative  respiratory negative  GI: negative   He has not had COVID this year.     Past Medical History  Past Medical  History:   Diagnosis Date     HTN (hypertension)      Papillary thyroid carcinoma (H) 03/25/2019     Post-surgical hypothyroidism 04/25/2019     Past Surgical History:   Procedure Laterality Date     DISSECTION RADICAL NECK MODIFIED N/A 4/25/2019    Procedure: Left Modified Radical Neck Dissection;  Surgeon: Yunier Rodriguez MD;  Location: [facility] OR     THYROIDECTOMY N/A 4/25/2019    Procedure: Total Thyroidectomy, Central Neck Dissection;  Surgeon: Yunier Rodriguez MD;  Location: [facility] OR     Medications    Current Outpatient Medications   Medication Sig Dispense Refill     acetaminophen (TYLENOL) 325 MG tablet Take 325-650 mg by mouth every 6 hours as needed for mild pain       calcium carbonate (TUMS) 500 MG chewable tablet Take 1 tablet (500 mg) by mouth daily 90 tablet 3     levothyroxine (SYNTHROID/LEVOTHROID) 150 MCG tablet MON to SAT 1 tablet/day;SUN 1.5 tablet 96 tablet 4     losartan (COZAAR) 50 MG tablet Take 1 tablet (50 mg) by mouth daily 90 tablet 3     mineral oil-hydrophilic petrolatum (AQUAPHOR) external ointment Apply topically every 8 hours Apply to neck incision three times daily (Patient not taking: Reported on 8/6/2019) 50 g 1       Allergies  Allergies   Allergen Reactions     Penicillins Hives     Family History  family history includes Diabetes in his brother; Hypertension in his mother.    Social History  Social History     Tobacco Use     Smoking status: Former Smoker     Smokeless tobacco: Never Used   Substance Use Topics     Alcohol use: Yes     Alcohol/week: 4.0 standard drinks     Types: 4 Cans of beer per week     Frequency: Monthly or less     Comment: once in awhile     Drug use: No     Wants to travel to [place];     Physical Exam  There were no vitals taken for this visit.  There is no height or weight on file to calculate BMI.  GENERAL: no distress  SKIN: Visible skin clear. No significant rash, abnormal pigmentation or lesions.  EYES: Eyes grossly normal to inspection.  No  discharge or erythema, or obvious scleral/conjunctival abnormalities.  NECK: no visible masses; no grossly visible goiter  RESP: No audible wheeze, cough, or visible cyanosis.  No visible retractions or increased work of breathing.    NEURO: Cranial nerves grossly intact.  Awake, alert, responds appropriately to questions.  Mentation and speech fluent.  PSYCH:affect normal, judgement and insight intact, and appearance well-groomed.    DATA REVIEW    ENDO THYROID LABS-[facility] Latest Ref Rng & Units 10/21/2020 1/9/2020   TSH 0.40 - 4.00 mU/L 0.03 (L) <0.01 (L)   T4 FREE 0.76 - 1.46 ng/dL 1.55 (H) 1.64 (H)     ENDO THYROID LABS-[facility] Latest Ref Rng & Units 10/8/2019   TSH 0.40 - 4.00 mU/L 0.71   T4 FREE 0.76 - 1.46 ng/dL 1.47 (H)       EXAMINATION: US HEAD NECK SOFT TISSUE, 11/4/2020 4:31 PM      COMPARISON: 4/10/2019.     HISTORY: Postoperative hypothyroidism. Papillary thyroid carcinoma.     Technique: Grayscale and color Doppler ultrasound images of the  cervical lymph nodes were performed.     Findings:    Cervical lymph nodes are measured bilaterally with measurements given  in craniocaudal, transverse and AP dimensions as follows:     Right:  Level 1: None  Level 2: 0.5 x 0.3 x 1.1 cm benign-appearing lymph node with a  preserved fatty hilum, previously measuring 0.8 x 1.1 x 1.6 cm.  Level 3: Benign-appearing lymph node with a preserved fatty hilum  measuring 0.7 x 0.2 x 1.0 cm. Ovoid hypoechoic lymph node with  replaced fatty hilum measuring 0.6 x 0.3 x 1.1 cm, previously  measuring 0.8 x 0.3 x 1.4 cm.   Level 4: None  Level 5: None  Level 6: None  Level 7: None     Left:  Level 1: None  Level 2: None.  Level 3: None.  Level 4: None  Level 5: 1.0 x 0.3 x 0.7 cm hypoechoic ovoid lymph node with replaced  fatty hilum, not characterized on the prior study.  Level 6: None.  Level 7: None                                                                      IMPRESSION: Soft tissue neck ultrasound with lymph node  measurements  as described above.     ROBERT GREEN MD

## 2020-11-30 NOTE — LETTER
"11/30/2020       RE: Price Rene  [id number]  [place] [address]     Dear Colleague,    Thank you for referring your patient, Price Rene, to the [facility] [place] [facility]. Please see a copy of my visit note below.    Price Rene is a 70 year old male who is being evaluated via a billable video visit.      The patient has been notified of following:     \"This video visit will be conducted via a call between you and your physician/provider. We have found that certain health care needs can be provided without the need for an in-person physical exam.  This service lets us provide the care you need with a video conversation.  If a prescription is necessary we can send it directly to your pharmacy.  If lab work is needed we can place an order for that and you can then stop by our lab to have the test done at a later time.    Video visits are billed at different rates depending on your insurance coverage.  Please reach out to your insurance provider with any questions.    If during the course of the call the physician/provider feels a video visit is not appropriate, you will not be charged for this service.\"    Video-Visit Details    Type of service:  Video Visit  Ashleigh Mai MA    Outcome for 11/28/20 12:45 PM :Left Voicemail for patient to call back            Endocrine progress note telephone visit due to  video connection problems prohibiting video visit.      Attending Assessment/Plan :     Papillary thyroid carcinoma (H)  mulfifocal, bilateral, subcm primary (4 foci, TTD not possible from path report data),17 LN + with bulky size (up to 4.4 cm) and TOR.  He has been treated with total Tx, CND and left LND and adjuvant 131I .   pT1a, pN1b, pMx, cMx, stage 2 minimum  MACIS 5.72 assuming complete resection and no distant mets  HOWARD recurrence risk HIGH due to # of " involved LN, size of involved LN, TOR  Pre 131I Tg level was not very high: Differential of this includes low thyroid volume vs poor differentiation   Watch left level 5 LN     Post-surgical hypothyroidismTreat to target TSH < 0.4 due to the thyroid cancer.    He is currently on LT4  150 * 7.5/week, divided.    USPS      Lung uptake on post therapy TBS from 7/23/19.  No corresponding abnormality on SPECT>  Radiologist believes this was artifact. I am placing on my list so I don't forget about it.      Due to the COVID 19 pandemic this visit was a telephone visit in order to help prevent spread of infection in this high risk patient and the general population. The patient gave verbal consent for the visit today.    Start time  1505 started Multiple attempts to reach  through [employer].  It won't let yo uchoose language.1515   Eventually I sent pat SMS text to connect without .   Stop time 1527  Total time 22 minutes   [id number] as an E & M code    Yanira Uribe MD      Chief complaint/ HISTORY OF PRESENT ILLNESS  Price presents todayfor follow up of thyroid cancer, post surgical hypothyroidism. He was seen by me along with the .     Left neck mass FNAB was read as positive for papillary thyroid carcinoma.  Imaging did not clearly show  a thyroid primary .   Thyroid cancer  treatment has been as follows:   4/25/19 total thyroidectomy, left central and modified radical neck dissection levels 2-4.  Right upper parathyroid autotx into left SCM (Dr Yunier Rodriguez) removing  papillary thyroid carcinoma, bilateral, largest 0.4 cm left (total tumor diameter not possible from path report), + LN 17/55 (12/26 CND, largest tumor focus 0.4 cm) with + TOR, largest tumor met 4.4 cm left level 2A, but tumor also in level 3 and 4 LNs  7/18/19 2 dose thyrogen stimulated 153 mCi 131I.        We have the following labs  4/8/19: Tg 8.1, HOWARD < 0.4,  TSH 1.2  4/25/19 Ca 8.4, PTH 28  4/26/19 Ca 7.8,  creatinine 0.71  5/8/19 TSH 2.88, Ca 8.9, phos 3.3, creatinine 0.65, urine iodine 261 mg/g creatinine  6/5/19 g < 0.1, LETICIA < 0.4 -   TSH 5.5, free T4 1.03, Ca 8.7, phos 2.7, creatinine 0.72; on LT4 150 mcg/day  6/23/19 we increased LT4 to 150 * 7.5/week  7/18/19 Tg 0.74, Leticia < 0.4,  thyrogen stimulated  10/8/19: Tg < 0.5, LETICIA 0.7, TSH 0.71, free T4 1.47, Ca 8.6  1/9/2020 TSH < 0.01, free T4 1.64  10/21/2020 Tg < 0.5, LETICIA < 0.4, TSH 0.03, free T4 1.55    I have reviewed images on PACS  3/14/19 CXR negative  4/10/19 thyroid and neck US:   Left superior # 1 0.3  X0.2  X 0.3   Left inferior # 2 0.4 x 0.2  X 0.3 cm   Right level 3 0.8 x0.4 x 1.4 Echogenic hilum  Left level 3 # 1 0.6 x 0.3 x 0.7 cm   Left level 5  Cystic mass 3.2 x 2.3 x 4.1 cm - internal mural area highly suspicious with echogenic foci 1.1 x 1 x 1 cm  4/10/19 CT neck with contrast:   Confirms the US   7/23/19 131I post therapy TBS neck uptake ; question of bilateral lung uptake - but there was no corresponding abnormality on SPECT   11/4/2020 neck US:   Right level 3 # 1 0.7 x 0.2 x 1.0   Right level 3 # 2 0.6 x 0.3 x 1.1   Left neck level 5 lower : 1 x 0.3 x 0.7 cm        REVIEW OF SYSTEMS  Feels well  Sleep is OK-- later he said he has sleep problem and he asked for sleeping pills.   Weight is stable   Cardiac negative  respiratory negative  GI: negative   He has not had COVID this year.     Past Medical History  Past Medical History:   Diagnosis Date     HTN (hypertension)      Papillary thyroid carcinoma (H) 03/25/2019     Post-surgical hypothyroidism 04/25/2019     Past Surgical History:   Procedure Laterality Date     DISSECTION RADICAL NECK MODIFIED N/A 4/25/2019    Procedure: Left Modified Radical Neck Dissection;  Surgeon: Yunier Rodriguez MD;  Location: [facility]     THYROIDECTOMY N/A 4/25/2019    Procedure: Total Thyroidectomy, Central Neck Dissection;  Surgeon: Yunier Rodriguez MD;  Location: [facility] OR     Medications    Current  Outpatient Medications   Medication Sig Dispense Refill     acetaminophen (TYLENOL) 325 MG tablet Take 325-650 mg by mouth every 6 hours as needed for mild pain       calcium carbonate (TUMS) 500 MG chewable tablet Take 1 tablet (500 mg) by mouth daily 90 tablet 3     levothyroxine (SYNTHROID/LEVOTHROID) 150 MCG tablet MON to SAT 1 tablet/day;SUN 1.5 tablet 96 tablet 4     losartan (COZAAR) 50 MG tablet Take 1 tablet (50 mg) by mouth daily 90 tablet 3     mineral oil-hydrophilic petrolatum (AQUAPHOR) external ointment Apply topically every 8 hours Apply to neck incision three times daily (Patient not taking: Reported on 8/6/2019) 50 g 1       Allergies  Allergies   Allergen Reactions     Penicillins Hives     Family History  family history includes Diabetes in his brother; Hypertension in his mother.    Social History  Social History     Tobacco Use     Smoking status: Former Smoker     Smokeless tobacco: Never Used   Substance Use Topics     Alcohol use: Yes     Alcohol/week: 4.0 standard drinks     Types: 4 Cans of beer per week     Frequency: Monthly or less     Comment: once in awhile     Drug use: No     Wants to travel to [place];     Physical Exam  There were no vitals taken for this visit.  There is no height or weight on file to calculate BMI.  GENERAL: no distress  SKIN: Visible skin clear. No significant rash, abnormal pigmentation or lesions.  EYES: Eyes grossly normal to inspection.  No discharge or erythema, or obvious scleral/conjunctival abnormalities.  NECK: no visible masses; no grossly visible goiter  RESP: No audible wheeze, cough, or visible cyanosis.  No visible retractions or increased work of breathing.    NEURO: Cranial nerves grossly intact.  Awake, alert, responds appropriately to questions.  Mentation and speech fluent.  PSYCH:affect normal, judgement and insight intact, and appearance well-groomed.    DATA REVIEW    ENDO THYROID LABS-[facility] Latest Ref Rng & Units 10/21/2020 1/9/2020   TSH  0.40 - 4.00 mU/L 0.03 (L) <0.01 (L)   T4 FREE 0.76 - 1.46 ng/dL 1.55 (H) 1.64 (H)     ENDO THYROID LABS-[facility] Latest Ref Rng & Units 10/8/2019   TSH 0.40 - 4.00 mU/L 0.71   T4 FREE 0.76 - 1.46 ng/dL 1.47 (H)       EXAMINATION: US HEAD NECK SOFT TISSUE, 11/4/2020 4:31 PM      COMPARISON: 4/10/2019.     HISTORY: Postoperative hypothyroidism. Papillary thyroid carcinoma.     Technique: Grayscale and color Doppler ultrasound images of the  cervical lymph nodes were performed.     Findings:    Cervical lymph nodes are measured bilaterally with measurements given  in craniocaudal, transverse and AP dimensions as follows:     Right:  Level 1: None  Level 2: 0.5 x 0.3 x 1.1 cm benign-appearing lymph node with a  preserved fatty hilum, previously measuring 0.8 x 1.1 x 1.6 cm.  Level 3: Benign-appearing lymph node with a preserved fatty hilum  measuring 0.7 x 0.2 x 1.0 cm. Ovoid hypoechoic lymph node with  replaced fatty hilum measuring 0.6 x 0.3 x 1.1 cm, previously  measuring 0.8 x 0.3 x 1.4 cm.   Level 4: None  Level 5: None  Level 6: None  Level 7: None     Left:  Level 1: None  Level 2: None.  Level 3: None.  Level 4: None  Level 5: 1.0 x 0.3 x 0.7 cm hypoechoic ovoid lymph node with replaced  fatty hilum, not characterized on the prior study.  Level 6: None.  Level 7: None                                                                      IMPRESSION: Soft tissue neck ultrasound with lymph node measurements  as described above.     ROBERT GREEN MD

## 2021-01-01 NOTE — BRIEF OP NOTE
[facility], [place]    Brief Operative Note    Pre-operative diagnosis: Papillary Thyroid Carcinoma  Post-operative diagnosis * No post-op diagnosis entered *  Procedure: Procedure(s):  Total Thyroidectomy, Central Neck Dissection  Left Modified Radical Neck Dissection  Surgeon: Surgeon(s) and Role:     * Yunier Rodriguez MD - Primary     * Dipesh Kulkarni - Resident - Assisting     * Mike Bah - Resident - Assisting  Anesthesia: General   Estimated blood loss: Less than 50 ml  Drains: Sven-Morton  Specimens:   ID Type Source Tests Collected by Time Destination   A : Total Thyroidectomy and Parametal Lobe Stitch Right Superior Pole Organ Thyroid SURGICAL PATHOLOGY EXAM Yunier Rodriguez MD 4/25/2019  9:49 AM    B : Left Neck Level 6 and 7  Tissue Neck SURGICAL PATHOLOGY EXAM Yunier Rodriguez MD 4/25/2019  9:57 AM    C : Question Right Inferior Parathyroid Organ Parathyroid SURGICAL PATHOLOGY EXAM Yunier Rodriguez MD 4/25/2019 10:25 AM    D : Left Neck 2A Tissue Neck SURGICAL PATHOLOGY EXAM Yunier Rodriguez MD 4/25/2019 11:36 AM    E : Left Neck 2B Tissue Neck SURGICAL PATHOLOGY EXAM Yunier Rodriguez MD 4/25/2019 11:36 AM    F : Left Neck 3 Tissue Neck SURGICAL PATHOLOGY EXAM Yunier Rodriguez MD 4/25/2019 11:37 AM    G : Left Neck 4 Tissue Neck SURGICAL PATHOLOGY EXAM Yunier Rodriguez MD 4/25/2019 11:37 AM      Findings:   see op note for full details.  Complications: None.  Implants:  * No implants in log *          56

## 2021-02-28 NOTE — PROGRESS NOTES
Observation goals PER UNIT ROUTINE     Comments: 1.  Observe for radiation safety compliance and understanding of restrictions after discharge -not dosed yet  2.  Observe for low  Iodine diet compliance-met   3.  Observe for early radiation side effects-not dosed yet    07/18/19 1141   Nursing GI/ Orders   (From admission, onward)      Pulmonary/Critical Care Daily Progress Note      Acute hypoxemic respiratory failure.  Now resolved.  Leukocytosis improving.  Likely reactive postoperative  CT chest PE protocol negative for acute PE.  CT chest with findings suggestive of mild atelectasis.  COVID-19 negative  Suspect related to underlying sleep apnea, asthma exacerbation.  At the time of my evaluation no acute wheezing appreciated.  ?  Aspiration.  CT chest with bilateral atelectatic changes.  No air bronchograms noted.  However given leukocytosis, elevated procalcitonin recommend treating for aspiration pneumonia.  Recommend short-term follow-up chest x-ray in 2 weeks.  Recommend PFTs as outpatient  Repeat chest x-ray 2/28 reviewed.  Low lung volumes upon my review.  No dense infiltrate appreciated.  Recommend follow-up as an outpatient in 2 weeks.  Will need follow-up CT chest 6 weeks to document resolution of findings noted.  Patient describes history of asthma currently not on any treatment.  Counseled about smoking cessation.  Recommend follow-up in pulmonary clinic for further evaluation of sleep apnea and severity of asthma.  We will start patient on albuterol as needed.  No wheezing appreciated currently on exam.  Reports abdominal tenderness.    Improving.  Currently an abdominal binder.  Atelectasis likely related to low lung volumes secondary to abdominal pain.  Encourage incentive spirometry, flutter valve use.  Leukocytosis likely reactive.  Follow-up culture results.     Anemia monitor H&H.  Transfuse to keep hemoglobin greater than 7.  Discussed with patient's nurse and Dr. Kirby.    Edmundo Avilez MD  Pulmonary Critical Care and Sleep Medicine       Subjective   Patient seen and examined, labs, vitals and imaging reviewed personally  No fevers  No acute events overnight reported to me      Objective   Vital signs in last 24 hours:  Temp:  [98.6 °F (37 °C)-98.8 °F (37.1 °C)] 98.6 °F (37 °C)  Heart Rate:  [100-117] 100  Resp:   [16] 16  BP: (109-110)/(76-77) 109/77  Weight change:     Intake/Output this shift:  I/O this shift:  In: -   Out: 140 [Drains:140]     Physical Exam:   On exam  HEENT: Pupils equal reactive to light and accommodation  Heart: S1-S2 heard  Neck: Supple, trachea midline  Lungs: Occasional basal crackles  P/A: Soft, nontender, nondistended, bowel sounds positive  Extremities: Trace edema  Skin: Warm      Medications:  Scheduled  • docusate sodium-sennosides  2 tablet Oral Daily   • polyethylene glycol  17 g Oral Daily   • cefTRIAXone (ROCEPHIN) IV  2,000 mg Intravenous Daily   • ipratropium-albuterol  3 mL Nebulization Q6H Resp     Infusions    PRN  ALPRAZolam, morphine injection, ondansetron, acetaminophen, HYDROcodone-acetaminophen, ipratropium-albuterol     Results:  Labs:   Recent Results (from the past 24 hour(s))   Procalcitonin    Collection Time: 02/28/21  5:48 AM   Result Value Ref Range    Procalcitonin 2.88 (H) <=0.09 ng/mL   Basic Metabolic Panel    Collection Time: 02/28/21  5:48 AM   Result Value Ref Range    Fasting Status      Sodium 136 135 - 145 mmol/L    Potassium 4.1 3.4 - 5.1 mmol/L    Chloride 104 98 - 107 mmol/L    Carbon Dioxide 23 21 - 32 mmol/L    Anion Gap 13 10 - 20 mmol/L    Glucose 125 (H) 65 - 99 mg/dL    BUN 38 (H) 6 - 20 mg/dL    Creatinine 1.02 (H) 0.51 - 0.95 mg/dL    Glomerular Filtration Rate 75 (L) >90 mL/min/1.73m2    BUN/ Creatinine Ratio 37 (H) 7 - 25    Calcium 9.1 8.4 - 10.2 mg/dL   CBC with Automated Differential (performable only)    Collection Time: 02/28/21  5:48 AM   Result Value Ref Range    WBC 15.2 (H) 4.2 - 11.0 K/mcL    RBC 3.09 (L) 4.00 - 5.20 mil/mcL    HGB 8.4 (L) 12.0 - 15.5 g/dL    HCT 25.0 (L) 36.0 - 46.5 %    MCV 80.9 78.0 - 100.0 fl    MCH 27.2 26.0 - 34.0 pg    MCHC 33.6 32.0 - 36.5 g/dL    RDW-CV 15.4 (H) 11.0 - 15.0 %    RDW-SD 45.4 39.0 - 50.0 fL     140 - 450 K/mcL    NRBC 0 <=0 /100 WBC    Neutrophil, Percent 75 %    Lymphocytes, Percent 15 %     Mono, Percent 9 %    Eosinophils, Percent 0 %    Basophils, Percent 0 %    Immature Granulocytes 1 %    Absolute Neutrophils 11.5 (H) 1.8 - 7.7 K/mcL    Absolute Lymphocytes 2.3 1.0 - 4.8 K/mcL    Absolute Monocytes 1.3 (H) 0.3 - 0.9 K/mcL    Absolute Eosinophils  0.0 0.0 - 0.5 K/mcL    Absolute Basophils 0.0 0.0 - 0.3 K/mcL    Absolute Immmature Granulocytes 0.1 0.0 - 0.2 K/mcL       Imaging, reviewed personally:  CTA CHEST PULMONARY EMBOLISM W CONTRAST   Final Result   COMBINED IMPRESSION:       1.    No acute pulmonary embolism to the segmental pulmonary artery level.      2.   Status post abdominoplasty, as detailed above.  Gas and fluid along   the surgical drains may represent postsurgical change.  There is no   well-formed collection or peripheral enhancement to suggest abscess;   however, correlation with clinical exam findings is recommended.      3.   Patchy right apical consolidation is favored to represent atelectasis.    Pneumonia is considered much less likely.      4.   Small hiatal hernia.         Electronically Signed by: GARDENIA MARKHAM M.D.    Signed on: 2/26/2021 4:58 PM          CT ABDOMEN PELVIS W CONTRAST - IV contrast only   Final Result   COMBINED IMPRESSION:       1.    No acute pulmonary embolism to the segmental pulmonary artery level.      2.   Status post abdominoplasty, as detailed above.  Gas and fluid along   the surgical drains may represent postsurgical change.  There is no   well-formed collection or peripheral enhancement to suggest abscess;   however, correlation with clinical exam findings is recommended.      3.   Patchy right apical consolidation is favored to represent atelectasis.    Pneumonia is considered much less likely.      4.   Small hiatal hernia.         Electronically Signed by: GARDENIA MARKHAM M.D.    Signed on: 2/26/2021 4:58 PM          XR CHEST PA OR AP 1 VIEW   Final Result   FINDINGS/IMPRESSION:        There are low lung volumes.  Opacity in the left lower  lobe is likely   infiltrate or atelectasis.  No pleural effusion or pneumothorax.  Pulmonary   vascularity is within normal limits.  There is mild cardiomegaly.  No gross   hilar enlargement.  Surgical clips in the left upper abdomen.      Electronically Signed by: MEMO PEDROZA M.D.    Signed on: 2/26/2021 3:22 PM          XR CHEST PA OR AP 1 VIEW    (Results Pending)   XR CHEST PA OR AP 1 VIEW    (Results Pending)

## 2021-05-17 ENCOUNTER — TELEPHONE (OUTPATIENT)
Dept: ENDOCRINOLOGY | Facility: CLINIC | Age: 71
End: 2021-05-17

## 2021-05-17 NOTE — TELEPHONE ENCOUNTER
Pt's daughter, Kisha,  left message to schedule follow up visit with Dr Uribe per clinic notes.   LVM with number to call to schedule lab draw.   CCs notified to help schedule. Please ask for Kisha.     Keshia Colunga RN on 5/17/2021 at 12:53 PM       RE  Instructions       Return in about 6 months (around 5/30/2021).

## 2021-05-24 DIAGNOSIS — E89.0 POST-SURGICAL HYPOTHYROIDISM: ICD-10-CM

## 2021-05-24 DIAGNOSIS — C73 PAPILLARY THYROID CARCINOMA (H): ICD-10-CM

## 2021-05-24 LAB
T4 FREE SERPL-MCNC: 1.52 NG/DL (ref 0.76–1.46)
TSH SERPL DL<=0.005 MIU/L-ACNC: <0.01 MU/L (ref 0.4–4)

## 2021-05-24 PROCEDURE — 84443 ASSAY THYROID STIM HORMONE: CPT | Performed by: PATHOLOGY

## 2021-05-24 PROCEDURE — 86800 THYROGLOBULIN ANTIBODY: CPT | Mod: 90 | Performed by: PATHOLOGY

## 2021-05-24 PROCEDURE — 36415 COLL VENOUS BLD VENIPUNCTURE: CPT | Performed by: PATHOLOGY

## 2021-05-24 PROCEDURE — 84432 ASSAY OF THYROGLOBULIN: CPT | Mod: 90 | Performed by: PATHOLOGY

## 2021-05-24 PROCEDURE — 99000 SPECIMEN HANDLING OFFICE-LAB: CPT | Performed by: PATHOLOGY

## 2021-05-24 PROCEDURE — 84439 ASSAY OF FREE THYROXINE: CPT | Performed by: PATHOLOGY

## 2021-06-02 LAB — LAB SCANNED RESULT: NORMAL

## 2021-06-08 NOTE — PROGRESS NOTES
Priec Rolandtoni Rene  is being evaluated via a billable video visit.      How would you like to obtain your AVS? Mail a copy     Text video visit link to: [phone]    Will anyone else be joining your video visit? Yes, granddaughter - Ally VILLASENOR MA

## 2021-06-09 ENCOUNTER — VIRTUAL VISIT (OUTPATIENT)
Dept: ENDOCRINOLOGY | Facility: CLINIC | Age: 71
End: 2021-06-09
Payer: MEDICARE

## 2021-06-09 DIAGNOSIS — C73 PAPILLARY THYROID CARCINOMA (H): Primary | ICD-10-CM

## 2021-06-09 DIAGNOSIS — E89.0 POST-SURGICAL HYPOTHYROIDISM: ICD-10-CM

## 2021-06-09 PROCEDURE — 99214 OFFICE O/P EST MOD 30 MIN: CPT | Mod: 95

## 2021-06-09 PROCEDURE — T1013 SIGN LANG/ORAL INTERPRETER: HCPCS | Mod: 95

## 2021-06-09 RX ORDER — LEVOTHYROXINE SODIUM 150 UG/1
TABLET ORAL
Qty: 192 TABLET | Refills: 1 | Status: SHIPPED | OUTPATIENT
Start: 2021-06-09 | End: 2022-01-26

## 2021-06-09 NOTE — LETTER
6/9/2021       RE: Price Rene  [id number]  [facility] [address]     Dear Colleague,    Thank you for referring your patient, Price Rene, to the [facility] [place] [facility]. Please see a copy of my visit note below.    Price Rene  is being evaluated via a billable video visit.      How would you like to obtain your AVS? Mail a copy     Text video visit link to: [phone]    Will anyone else be joining your video visit? Yes, granddaughter - Ally     Ashleigh VILLASENOR MA        Endocrinology Progress note    Attending Assessment/Plan :     Papillary thyroid carcinoma (H)  mulfifocal, bilateral, subcm primary (4 foci, TTD not possible from path report data),17 LN + with bulky size (up to 4.4 cm) and TOR.  He has been treated with total Tx, CND and left LND and adjuvant 131I .   pT1a, pN1b, pMx, cMx, stage 2 minimum  MACIS 5.72 assuming complete resection and no distant mets  HOWARD recurrence risk HIGH due to # of involved LN, size of involved LN, TOR  Pre 131I Tg level was not very high: Differential of this includes low thyroid volume vs poor differentiation   Watch left level 5 LN--next US 11/2021    RTC 12/2021 FTF PAN space     Post-surgical hypothyroidismTreat to target TSH < 0.4 due to the thyroid cancer.    He is currently on LT4  150 * 7.5/week, divided.  Change Rx to 6 month supply due to international travel  [place]      Lung uptake on post therapy TBS from 7/23/19.  No corresponding abnormality on SPECT>  Radiologist believes this was artifact. I am placing on my list so I don't forget about it.      Due to the COVID 19 pandemic this visit was a video visit. The patient gave verbal consent for the visit today.    I have independently reviewed and interpreted labs, imaging as indicated.     Chart review/prep time 1  [id number]  Visit Start time call to  services  2129,   Visit Stop time  1516  _23_ minutes spent on the date of the encounter doing chart review, history and exam, documentation and further activities as noted above.    Yanira Uribe MD      Chief complaint/ HISTORY OF PRESENT ILLNESS  Price presents todayfor follow up of thyroid cancer, post surgical hypothyroidism. He was seen by me along with the .  I last saw him 11/30/2020.  He already had labs in anticipation of this appt    Left neck mass FNAB was read as positive for papillary thyroid carcinoma.  Imaging did not clearly show  a thyroid primary .   Thyroid cancer  treatment has been as follows:   4/25/19 total thyroidectomy, left central and modified radical neck dissection levels 2-4.  Right upper parathyroid autotx into left SCM (Dr Yunier Rodriguez) removing  papillary thyroid carcinoma, bilateral, largest 0.4 cm left (total tumor diameter not possible from path report), + LN 17/55 (12/26 CND, largest tumor focus 0.4 cm) with + TOR, largest tumor met 4.4 cm left level 2A, but tumor also in level 3 and 4 LNs  7/18/19 2 dose thyrogen stimulated 153 mCi 131I.        We have the following labs  4/8/19: Tg 8.1, HOWARD < 0.4,  TSH 1.2  4/25/19 Ca 8.4, PTH 28  4/26/19 Ca 7.8, creatinine 0.71  5/8/19 TSH 2.88, Ca 8.9, phos 3.3, creatinine 0.65, urine iodine 261 mg/g creatinine  6/5/19 g < 0.1, HOWARD < 0.4 -   TSH 5.5, free T4 1.03, Ca 8.7, phos 2.7, creatinine 0.72; on LT4 150 mcg/day  6/23/19 we increased LT4 to 150 * 7.5/week  7/18/19 Tg 0.74, Howard < 0.4,  thyrogen stimulated  10/8/19: Tg < 0.5, HOWARD 0.7, TSH 0.71, free T4 1.47, Ca 8.6  1/9/2020 TSH < 0.01, free T4 1.64  10/21/2020 Tg < 0.5, HOWARD < 0.4, TSH 0.03, free T4 1.55  5/24/2012 Tg < 0.1, HOWARD < 0.4 , TSH < 0.01, free T4 1.52    I have reviewed images on PACS  3/14/19 CXR negative  4/10/19 thyroid and neck US:   Left superior # 1 0.3  X0.2  X 0.3   Left inferior # 2 0.4 x 0.2  X 0.3 cm   Right level 3 0.8  x0.4 x 1.4 Echogenic hilum  Left level 3 # 1 0.6 x 0.3 x 0.7 cm   Left level 5  Cystic mass 3.2 x 2.3 x 4.1 cm - internal mural area highly suspicious with echogenic foci 1.1 x 1 x 1 cm  4/10/19 CT neck with contrast:   Confirms the US   7/23/19 131I post therapy TBS neck uptake ; question of bilateral lung uptake - but there was no corresponding abnormality on SPECT   11/4/2020 neck US:   Right level 3 # 1 0.7 x 0.2 x 1.0   Right level 3 # 2 0.6 x 0.3 x 1.1   Left neck level 5 lower : 1 x 0.3 x 0.7 cm      REVIEW OF SYSTEMS  Feeling OK  He has not had covid; he had covid vaccine in [place] x 1 and also in [place] x 1.   Sleep is OK  Cardiac: negative  Respiratory: negative  GI: negative    Past Medical History  Past Medical History:   Diagnosis Date     HTN (hypertension)      Papillary thyroid carcinoma (H) 03/25/2019     Post-surgical hypothyroidism 04/25/2019     Past Surgical History:   Procedure Laterality Date     DISSECTION RADICAL NECK MODIFIED N/A 4/25/2019    Procedure: Left Modified Radical Neck Dissection;  Surgeon: Yunier Rodriguez MD;  Location: [facility] OR     THYROIDECTOMY N/A 4/25/2019    Procedure: Total Thyroidectomy, Central Neck Dissection;  Surgeon: Yunier Rodriguez MD;  Location: [facility] OR     Medications    Current Outpatient Medications   Medication Sig Dispense Refill     acetaminophen (TYLENOL) 325 MG tablet Take 325-650 mg by mouth every 6 hours as needed for mild pain       levothyroxine (SYNTHROID/LEVOTHROID) 150 MCG tablet MON to SAT 1 tablet/day;SUN 1.5 tablet 96 tablet 4     losartan (COZAAR) 50 MG tablet Take 1 tablet (50 mg) by mouth daily 90 tablet 3     mineral oil-hydrophilic petrolatum (AQUAPHOR) external ointment Apply topically every 8 hours Apply to neck incision three times daily (Patient not taking: Reported on 8/6/2019) 50 g 1       Allergies  Allergies   Allergen Reactions     Penicillins Hives     Family History  Family History   Problem Relation Age of Onset     Hypertension  "Mother      Diabetes Brother      Thyroid Disease No family hx of      Thyroid Cancer No family hx of      Cancer No family hx of      Social History  Social History     Tobacco Use     Smoking status: Former Smoker     Smokeless tobacco: Never Used   Substance Use Topics     Alcohol use: Yes     Alcohol/week: 4.0 standard drinks     Types: 4 Cans of beer per week     Frequency: Monthly or less     Comment: once in awhile     Drug use: No     Exercises some; he gets more exercise in [place].  He will next go there on SAT this week.  Needs 6 month  Supply on med    Physical Exam  There were no vitals taken for this visit.  There is no height or weight on file to calculate BMI.   BP Readings from Last 1 Encounters:   10/08/19 (!) 176/96      Pulse Readings from Last 1 Encounters:   10/08/19 63      Resp Readings from Last 1 Encounters:   07/18/19 16      Temp Readings from Last 1 Encounters:   08/06/19 98  F (36.7  C) (Oral)      SpO2 Readings from Last 1 Encounters:   07/18/19 94%      Wt Readings from Last 1 Encounters:   10/08/19 78.8 kg (173 lb 12.8 oz)      Ht Readings from Last 1 Encounters:   06/05/19 1.638 m (5' 4.5\")     GENERAL: no distress, I can  see from mid trunk up; there  Is a young man sitting to his left, off camera.    SKIN: Visible skin clear. No significant rash, abnormal pigmentation or lesions.  EYES: Eyes grossly normal to inspection.  .  NECK: no visible masses;   RESP: No audible wheeze, cough, or visible cyanosis.  No visible retractions or increased work of breathing.    NEURO: Cranial nerves grossly intact.  Awake, alert, responds appropriately to questions.  Mentation and speech fluent.  PSYCH:affect normal, judgement and insight intact, and appearance well-groomed.    DATA REVIEW      ENDO THYROID LABS-[facility] Latest Ref Rng & Units 5/24/2021 10/21/2020   TSH 0.40 - 4.00 mU/L <0.01 (L) 0.03 (L)   T4 FREE 0.76 - 1.46 ng/dL 1.52 (H) 1.55 (H)     ENDO THYROID LABS-[facility] Latest Ref Rng & Units " 1/9/2020 10/8/2019   TSH 0.40 - 4.00 mU/L <0.01 (L) 0.71   T4 FREE 0.76 - 1.46 ng/dL 1.64 (H) 1.47 (H)     ENDO THYROID LABS-[facility] Latest Ref Rng & Units 7/18/2019   TSH 0.40 - 4.00 mU/L 198.13 (H)   T4 FREE 0.76 - 1.46 ng/dL

## 2021-06-09 NOTE — PATIENT INSTRUCTIONS
Get neck ultrasound in November 2021    See me in clinic December 6, 2021-- this should be face to face     Lab orders for mid November, 2021.

## 2021-06-09 NOTE — PROGRESS NOTES
Endocrinology Progress note    Attending Assessment/Plan :     Papillary thyroid carcinoma (H)  mulfifocal, bilateral, subcm primary (4 foci, TTD not possible from path report data),17 LN + with bulky size (up to 4.4 cm) and TOR.  He has been treated with total Tx, CND and left LND and adjuvant 131I .   pT1a, pN1b, pMx, cMx, stage 2 minimum  MACIS 5.72 assuming complete resection and no distant mets  HOWARD recurrence risk HIGH due to # of involved LN, size of involved LN, TOR  Pre 131I Tg level was not very high: Differential of this includes low thyroid volume vs poor differentiation   Watch left level 5 LN--next US 11/2021    RTC 12/2021 FTF PAN space     Post-surgical hypothyroidismTreat to target TSH < 0.4 due to the thyroid cancer.    He is currently on LT4  150 * 7.5/week, divided.  Change Rx to 6 month supply due to international travel  [place]      Lung uptake on post therapy TBS from 7/23/19.  No corresponding abnormality on SPECT>  Radiologist believes this was artifact. I am placing on my list so I don't forget about it.      Due to the COVID 19 pandemic this visit was a video visit. The patient gave verbal consent for the visit today.    I have independently reviewed and interpreted labs, imaging as indicated.     Chart review/prep time 1  [id number]  Visit Start time call to  services 1458,   Visit Stop time  1516  _23_ minutes spent on the date of the encounter doing chart review, history and exam, documentation and further activities as noted above.    Yanira Uribe MD      Chief complaint/ HISTORY OF PRESENT ILLNESS  Price presents todayfor follow up of thyroid cancer, post surgical hypothyroidism. He was seen by me along with the .  I last saw him 11/30/2020.  He already had labs in anticipation of this appt    Left neck mass FNAB was read as positive for papillary thyroid carcinoma.  Imaging did not clearly show  a thyroid primary .   Thyroid  cancer  treatment has been as follows:   4/25/19 total thyroidectomy, left central and modified radical neck dissection levels 2-4.  Right upper parathyroid autotx into left SCM (Dr Yunier Rodriguez) removing  papillary thyroid carcinoma, bilateral, largest 0.4 cm left (total tumor diameter not possible from path report), + LN 17/55 (12/26 CND, largest tumor focus 0.4 cm) with + TOR, largest tumor met 4.4 cm left level 2A, but tumor also in level 3 and 4 LNs  7/18/19 2 dose thyrogen stimulated 153 mCi 131I.        We have the following labs  4/8/19: Tg 8.1, LETICIA < 0.4,  TSH 1.2  4/25/19 Ca 8.4, PTH 28  4/26/19 Ca 7.8, creatinine 0.71  5/8/19 TSH 2.88, Ca 8.9, phos 3.3, creatinine 0.65, urine iodine 261 mg/g creatinine  6/5/19 g < 0.1, LETICIA < 0.4 -   TSH 5.5, free T4 1.03, Ca 8.7, phos 2.7, creatinine 0.72; on LT4 150 mcg/day  6/23/19 we increased LT4 to 150 * 7.5/week  7/18/19 Tg 0.74, Leticia < 0.4,  thyrogen stimulated  10/8/19: Tg < 0.5, LETICIA 0.7, TSH 0.71, free T4 1.47, Ca 8.6  1/9/2020 TSH < 0.01, free T4 1.64  10/21/2020 Tg < 0.5, LETICIA < 0.4, TSH 0.03, free T4 1.55  5/24/2012 Tg < 0.1, LETICIA < 0.4 , TSH < 0.01, free T4 1.52    I have reviewed images on PACS  3/14/19 CXR negative  4/10/19 thyroid and neck US:   Left superior # 1 0.3  X0.2  X 0.3   Left inferior # 2 0.4 x 0.2  X 0.3 cm   Right level 3 0.8 x0.4 x 1.4 Echogenic hilum  Left level 3 # 1 0.6 x 0.3 x 0.7 cm   Left level 5  Cystic mass 3.2 x 2.3 x 4.1 cm - internal mural area highly suspicious with echogenic foci 1.1 x 1 x 1 cm  4/10/19 CT neck with contrast:   Confirms the US   7/23/19 131I post therapy TBS neck uptake ; question of bilateral lung uptake - but there was no corresponding abnormality on SPECT   11/4/2020 neck US:   Right level 3 # 1 0.7 x 0.2 x 1.0   Right level 3 # 2 0.6 x 0.3 x 1.1   Left neck level 5 lower : 1 x 0.3 x 0.7 cm      REVIEW OF SYSTEMS  Feeling OK  He has not had covid; he had covid vaccine in [place] x 1 and also in [place] x [place].    Sleep is OK  Cardiac: negative  Respiratory: negative  GI: negative    Past Medical History  Past Medical History:   Diagnosis Date     HTN (hypertension)      Papillary thyroid carcinoma (H) 03/25/2019     Post-surgical hypothyroidism 04/25/2019     Past Surgical History:   Procedure Laterality Date     DISSECTION RADICAL NECK MODIFIED N/A 4/25/2019    Procedure: Left Modified Radical Neck Dissection;  Surgeon: Yunier Rodriguez MD;  Location: [facility] OR     THYROIDECTOMY N/A 4/25/2019    Procedure: Total Thyroidectomy, Central Neck Dissection;  Surgeon: Yunier Rodriguez MD;  Location: [facility] OR     Medications    Current Outpatient Medications   Medication Sig Dispense Refill     acetaminophen (TYLENOL) 325 MG tablet Take 325-650 mg by mouth every 6 hours as needed for mild pain       levothyroxine (SYNTHROID/LEVOTHROID) 150 MCG tablet MON to SAT 1 tablet/day;SUN 1.5 tablet 96 tablet 4     losartan (COZAAR) 50 MG tablet Take 1 tablet (50 mg) by mouth daily 90 tablet 3     mineral oil-hydrophilic petrolatum (AQUAPHOR) external ointment Apply topically every 8 hours Apply to neck incision three times daily (Patient not taking: Reported on 8/6/2019) 50 g 1       Allergies  Allergies   Allergen Reactions     Penicillins Hives     Family History  Family History   Problem Relation Age of Onset     Hypertension Mother      Diabetes Brother      Thyroid Disease No family hx of      Thyroid Cancer No family hx of      Cancer No family hx of      Social History  Social History     Tobacco Use     Smoking status: Former Smoker     Smokeless tobacco: Never Used   Substance Use Topics     Alcohol use: Yes     Alcohol/week: 4.0 standard drinks     Types: 4 Cans of beer per week     Frequency: Monthly or less     Comment: once in awhile     Drug use: No     Exercises some; he gets more exercise in [place].  He will next go there on SAT this week.  Needs 6 month  Supply on med    Physical Exam  There were no vitals taken for this  "visit.  There is no height or weight on file to calculate BMI.   BP Readings from Last 1 Encounters:   10/08/19 (!) 176/96      Pulse Readings from Last 1 Encounters:   10/08/19 63      Resp Readings from Last 1 Encounters:   07/18/19 16      Temp Readings from Last 1 Encounters:   08/06/19 98  F (36.7  C) (Oral)      SpO2 Readings from Last 1 Encounters:   07/18/19 94%      Wt Readings from Last 1 Encounters:   10/08/19 78.8 kg (173 lb 12.8 oz)      Ht Readings from Last 1 Encounters:   06/05/19 1.638 m (5' 4.5\")     GENERAL: no distress, I can  see from mid trunk up; there  Is a young man sitting to his left, off camera.    SKIN: Visible skin clear. No significant rash, abnormal pigmentation or lesions.  EYES: Eyes grossly normal to inspection.  .  NECK: no visible masses;   RESP: No audible wheeze, cough, or visible cyanosis.  No visible retractions or increased work of breathing.    NEURO: Cranial nerves grossly intact.  Awake, alert, responds appropriately to questions.  Mentation and speech fluent.  PSYCH:affect normal, judgement and insight intact, and appearance well-groomed.    DATA REVIEW      ENDO THYROID LABS-[facility] Latest Ref Rng & Units 5/24/2021 10/21/2020   TSH 0.40 - 4.00 mU/L <0.01 (L) 0.03 (L)   T4 FREE 0.76 - 1.46 ng/dL 1.52 (H) 1.55 (H)     ENDO THYROID LABS-[facility] Latest Ref Rng & Units 1/9/2020 10/8/2019   TSH 0.40 - 4.00 mU/L <0.01 (L) 0.71   T4 FREE 0.76 - 1.46 ng/dL 1.64 (H) 1.47 (H)     ENDO THYROID LABS-[facility] Latest Ref Rng & Units 7/18/2019   TSH 0.40 - 4.00 mU/L 198.13 (H)   T4 FREE 0.76 - 1.46 ng/dL      "

## 2022-01-26 ENCOUNTER — VIRTUAL VISIT (OUTPATIENT)
Dept: ENDOCRINOLOGY | Facility: CLINIC | Age: 72
End: 2022-01-26
Payer: MEDICARE

## 2022-01-26 DIAGNOSIS — C73 PAPILLARY THYROID CARCINOMA (H): ICD-10-CM

## 2022-01-26 DIAGNOSIS — E89.0 POST-SURGICAL HYPOTHYROIDISM: ICD-10-CM

## 2022-01-26 DIAGNOSIS — Z60.3 IMMIGRANT WITH LANGUAGE DIFFICULTY: Primary | ICD-10-CM

## 2022-01-26 PROCEDURE — 99214 OFFICE O/P EST MOD 30 MIN: CPT | Mod: 95

## 2022-01-26 RX ORDER — LEVOTHYROXINE SODIUM 150 UG/1
TABLET ORAL
Qty: 192 TABLET | Refills: 1 | Status: SHIPPED | OUTPATIENT
Start: 2022-01-26 | End: 2023-01-04

## 2022-01-26 SDOH — SOCIAL STABILITY - SOCIAL INSECURITY: ACCULTURATION DIFFICULTY: Z60.3

## 2022-01-26 NOTE — LETTER
1/26/2022       RE: Price Rene  [phone]  [facility] [address]     Dear Colleague,    Thank you for referring your patient, Price Rene, to the [facility] [place] at [facility]. Please see a copy of my visit note below.    Endocrinology video visit  note    Attending Assessment/Plan :     Papillary thyroid carcinoma (H)  mulfifocal, bilateral, subcm primary (4 foci, TTD not possible from path report data),17 LN + with bulky size (up to 4.4 cm) and TOR.  He has been treated with total Tx, CND and left LND and adjuvant 131I .   pT1a, pN1b, pMx, cMx, stage 2 minimum  MACIS 5.72 assuming complete resection and no distant mets  HOWARD recurrence risk HIGH due to # of involved LN, size of involved LN, TOR  Pre 131I Tg level was not very high: Differential of this includes low thyroid volume vs poor differentiation   Neck US following appt  Labs following appt  See me yearly -- orders for 2023 labs placed.     Post-surgical hypothyroidismTreat to target TSH < 0.4 due to the thyroid cancer.    He is currently on LT4  150 * 7.5/week, divided. Refilled with 6 months supply again at his request.   [facility]      Lung uptake on post therapy TBS from 7/23/19.  No corresponding abnormality on SPECT>  Radiologist believes this was artifact. I am placing on my list so I don't forget about it.      Immigrant with language barrier.  I think we uncovered a few misunderstandings today.      Due to the COVID 19 pandemic this visit was a video visit. The patient gave verbal consent for the visit today.    I have independently reviewed and interpreted labs, imaging as indicated.     Chart review/prep time 1  [id number]  Visit Start time  1538   Visit Stop time  1555   _22_ minutes spent on the date of the encounter doing chart review, history and exam, documentation and further activities as noted above.    Yanira MCCRAY  "MD Rony      Chief complaint/ HISTORY OF PRESENT ILLNESS  Price presents todayfor follow up of thyroid cancer, post surgical hypothyroidism. He is alone today.  He was seen by me along with the male .  I last saw him 6/2021.  At that time I ordered labs and imaging to precede this follow up appt.  None of the tests have been done. Today he says \"nobody told me\". (I know I personally told him at the last appt).     Left neck mass FNAB was read as positive for papillary thyroid carcinoma.  Imaging did not clearly show  a thyroid primary .   Thyroid cancer  treatment has been as follows:   4/25/19 total thyroidectomy, left central and modified radical neck dissection levels 2-4.  Right upper parathyroid autotx into left SCM (Dr Yunier Rodriguez) removing  papillary thyroid carcinoma, bilateral, largest 0.4 cm left (total tumor diameter not possible from path report), + LN 17/55 (12/26 CND, largest tumor focus 0.4 cm) with + TOR, largest tumor met 4.4 cm left level 2A, but tumor also in level 3 and 4 LNs  7/18/19 2 dose thyrogen stimulated 153 mCi 131I.        We have the following labs  4/8/19: Tg 8.1, HOWARD < 0.4,  TSH 1.2  4/25/19 Ca 8.4, PTH 28  4/26/19 Ca 7.8, creatinine 0.71  5/8/19 TSH 2.88, Ca 8.9, phos 3.3, creatinine 0.65, urine iodine 261 mg/g creatinine  6/5/19 g < 0.1, HOWARD < 0.4 -   TSH 5.5, free T4 1.03, Ca 8.7, phos 2.7, creatinine 0.72; on LT4 150 mcg/day  6/23/19 we increased LT4 to 150 * 7.5/week  7/18/19 Tg 0.74, Howard < 0.4,  thyrogen stimulated  10/8/19: Tg < 0.5, HOWARD 0.7, TSH 0.71, free T4 1.47, Ca 8.6  1/9/2020 TSH < 0.01, free T4 1.64  10/21/2020 Tg < 0.5, HOWARD < 0.4, TSH 0.03, free T4 1.55  5/24/2021 Tg < 0.1, HOWARD < 0.4 , TSH < 0.01, free T4 1.52  1/27/2022 following appt Tg pending  TSH 0.02, free T4 1.41    I have reviewed images on PACS  3/14/19 CXR negative  4/10/19 thyroid and neck US:   Left superior # 1 0.3  X0.2  X 0.3   Left inferior # 2 0.4 x 0.2  X 0.3 cm " "  Right level 3 0.8 x0.4 x 1.4 Echogenic hilum  Left level 3 # 1 0.6 x 0.3 x 0.7 cm   Left level 5  Cystic mass 3.2 x 2.3 x 4.1 cm - internal mural area highly suspicious with echogenic foci 1.1 x 1 x 1 cm  4/10/19 CT neck with contrast:   Confirms the US   7/23/19 131I post therapy TBS neck uptake ; question of bilateral lung uptake - but there was no corresponding abnormality on SPECT   11/4/2020 neck US:   Right level 3 # 1 0.7 x 0.2 x 1.0   Right level 3 # 2 0.6 x 0.3 x 1.1   Left neck level 5 lower : 1 x 0.3 x 0.7 cm   1/31/2022 following appt neck US compared with 11/4/2020 neck US:   Right level 3 # 1 not seen was  0.7 x 0.2 x 1.0   Right level 3 # 2 not seen was 0.6 x 0.3 x 1.1   Left neck level 5 \"high\" 0.5 x 0.3 x 1.0 near surface   Left neck level 5 \"low\"  ? Not seen       REVIEW OF SYSTEMS  Energy level OK  Weight stable  Cardiac: some CP intermittently , not bad; last 20 years   Respiratory: negative  GI: negative  He has not had covid; he had covid vaccine x .   Should I continue to avoid dairy?     Past Medical History  Past Medical History:   Diagnosis Date     HTN (hypertension)      Papillary thyroid carcinoma (H) 03/25/2019     Post-surgical hypothyroidism 04/25/2019     Past Surgical History:   Procedure Laterality Date     DISSECTION RADICAL NECK MODIFIED N/A 4/25/2019    Procedure: Left Modified Radical Neck Dissection;  Surgeon: Yunier Rodriguez MD;  Location: [facility] OR     THYROIDECTOMY N/A 4/25/2019    Procedure: Total Thyroidectomy, Central Neck Dissection;  Surgeon: Yunier Rodriguez MD;  Location: [facility] OR     Medications    Current Outpatient Medications   Medication Sig Dispense Refill     acetaminophen (TYLENOL) 325 MG tablet Take 325-650 mg by mouth every 6 hours as needed for mild pain       levothyroxine (SYNTHROID/LEVOTHROID) 150 MCG tablet MON to SAT 1 tablet/day;SUN 1.5 tablet 192 tablet 1     losartan (COZAAR) 50 MG tablet Take 1 tablet (50 mg) by mouth daily 90 tablet 3     " "mineral oil-hydrophilic petrolatum (AQUAPHOR) external ointment Apply topically every 8 hours Apply to neck incision three times daily 50 g 1       Allergies  Allergies   Allergen Reactions     Penicillins Hives     Family History  Family History   Problem Relation Age of Onset     Hypertension Mother      Diabetes Brother      Thyroid Disease No family hx of      Thyroid Cancer No family hx of      Cancer No family hx of      Social History  Social History     Tobacco Use     Smoking status: Former Smoker     Smokeless tobacco: Never Used   Substance Use Topics     Alcohol use: Yes     Alcohol/week: 4.0 standard drinks     Types: 4 Cans of beer per week     Comment: once in awhile     Drug use: No       Physical Exam  There were no vitals taken for this visit.  There is no height or weight on file to calculate BMI.   BP Readings from Last 1 Encounters:   10/08/19 (!) 176/96      Pulse Readings from Last 1 Encounters:   10/08/19 63      Resp Readings from Last 1 Encounters:   07/18/19 16      Temp Readings from Last 1 Encounters:   08/06/19 98  F (36.7  C) (Oral)      SpO2 Readings from Last 1 Encounters:   07/18/19 94%      Wt Readings from Last 1 Encounters:   10/08/19 78.8 kg (173 lb 12.8 oz)      Ht Readings from Last 1 Encounters:   06/05/19 1.638 m (5' 4.5\")     GENERAL: no distress, I can  see from mid trunk up.  He is noving about the house location to location   SKIN: Visible skin clear. No significant rash, abnormal pigmentation or lesions.  EYES: Eyes grossly normal to inspection.  .  NECK: no visible masses;   RESP: No audible wheeze, cough, or visible cyanosis.  No visible retractions or increased work of breathing.    NEURO: Awake, alert, responds appropriately to questions.  Mentation and speech fluent.  PSYCH:affect normal, and appearance well-groomed.    DATA REVIEW  Results for LIAN CAMPOSAAKASH RODRIGUES (MRN [id number]) as of 2/2/2022 06:29   Ref. Range 1/27/2022 13:45   T4 Free Latest Ref Range: " 0.76 - 1.46 ng/dL 1.41   TSH Latest Ref Range: 0.40 - 4.00 mU/L 0.02 (L)     ENDO THYROID LABS-[facility] Latest Ref Rng & Units 5/24/2021 10/21/2020   TSH 0.40 - 4.00 mU/L <0.01 (L) 0.03 (L)   FREE T4 0.76 - 1.46 ng/dL 1.52 (H) 1.55 (H)     ENDO THYROID LABS-[facility] Latest Ref Rng & Units 1/9/2020   TSH 0.40 - 4.00 mU/L <0.01 (L)   FREE T4 0.76 - 1.46 ng/dL 1.64 (H)       Narrative & Impression   EXAMINATION: US HEAD NECK SOFT TISSUE, 1/31/2022 1:38 PM      COMPARISON: Ultrasound 11/4/2020     HISTORY: Papillary thyroid carcinoma. Postsurgical hypothyroidism     TECHNIQUE: Grayscale and color ultrasound imaging of the neck was  performed.     FINDINGS:     Lymph nodes are measured bilaterally with measurements given in  craniocaudal, transverse and AP dimensions as follows:     Right:  Level 1: No lymph nodes seen  Level 2: 1.9 x 1.3 x 0.6 cm lymph node with fatty hilum  Level 3: No lymph nodes seen  Level 4: No lymph nodes seen  Level 5: No lymph nodes seen  Level 6: No lymph nodes seen     Left:  Level 1: No lymph nodes seen  Level 2: No lymph nodes seen  Level 3: No lymph nodes seen  Level 4: No lymph nodes seen  Level 5: 1.0 x 0.5 x 0.3 cm lymph node with possible fatty hilum  unchanged from prior study.  Level 6: No lymph nodes seen                                                                      IMPRESSION:  1.  Soft tissue neck ultrasound with lymph node measurements as  described above. No suspicious appearing lymph nodes.     I have personally reviewed the examination and initial interpretation  and I agree with the findings.     CHUCK HOPSON MD                Price is a 71 year old who is being evaluated via a billable video visit.      Needs a refill on losartan.    How would you like to obtain your AVS? MyChart  If the video visit is dropped, the invitation should be resent by: Text to cell phone: [phone]   Will anyone else be joining your video visit? No        Again, thank you for allowing me to  participate in the care of your patient.      Sincerely,    Yanira Uribe MD

## 2022-01-26 NOTE — PATIENT INSTRUCTIONS
Labs now and about 3 weeks before the next appointment in a year .     Ultrasound ([id number] to schedule)    See me approximately yearly

## 2022-01-26 NOTE — PROGRESS NOTES
Endocrinology video visit  note    Attending Assessment/Plan :     Papillary thyroid carcinoma (H)  mulfifocal, bilateral, subcm primary (4 foci, TTD not possible from path report data),17 LN + with bulky size (up to 4.4 cm) and TOR.  He has been treated with total Tx, CND and left LND and adjuvant 131I .   pT1a, pN1b, pMx, cMx, stage 2 minimum  MACIS 5.72 assuming complete resection and no distant mets  HOWARD recurrence risk HIGH due to # of involved LN, size of involved LN, TOR  Pre 131I Tg level was not very high: Differential of this includes low thyroid volume vs poor differentiation   Neck US following appt  Labs following appt  See me yearly -- orders for 2023 labs placed.     Post-surgical hypothyroidismTreat to target TSH < 0.4 due to the thyroid cancer.    He is currently on LT4  150 * 7.5/week, divided. Refilled with 6 months supply again at his request.   [facility]      Lung uptake on post therapy TBS from 7/23/19.  No corresponding abnormality on SPECT>  Radiologist believes this was artifact. I am placing on my list so I don't forget about it.      Immigrant with language barrier.  I think we uncovered a few misunderstandings today.      Due to the COVID 19 pandemic this visit was a video visit. The patient gave verbal consent for the visit today.    I have independently reviewed and interpreted labs, imaging as indicated.     Chart review/prep time 1  [id number]  Visit Start time  1538   Visit Stop time  1555   _22_ minutes spent on the date of the encounter doing chart review, history and exam, documentation and further activities as noted above.    Yanira Uribe MD      Chief complaint/ HISTORY OF PRESENT ILLNESS  Price presents todayfor follow up of thyroid cancer, post surgical hypothyroidism. He is alone today.  He was seen by me along with the male .  I last saw him 6/2021.  At that time I ordered labs and imaging to precede this follow up appt.  None of the tests have been  "done. Today he says \"nobody told me\". (I know I personally told him at the last appt).     Left neck mass FNAB was read as positive for papillary thyroid carcinoma.  Imaging did not clearly show  a thyroid primary .   Thyroid cancer  treatment has been as follows:   4/25/19 total thyroidectomy, left central and modified radical neck dissection levels 2-4.  Right upper parathyroid autotx into left SCM (Dr Yunier Rodriguez) removing  papillary thyroid carcinoma, bilateral, largest 0.4 cm left (total tumor diameter not possible from path report), + LN 17/55 (12/26 CND, largest tumor focus 0.4 cm) with + TOR, largest tumor met 4.4 cm left level 2A, but tumor also in level 3 and 4 LNs  7/18/19 2 dose thyrogen stimulated 153 mCi 131I.        We have the following labs  4/8/19: Tg 8.1, LETICIA < 0.4,  TSH 1.2  4/25/19 Ca 8.4, PTH 28  4/26/19 Ca 7.8, creatinine 0.71  5/8/19 TSH 2.88, Ca 8.9, phos 3.3, creatinine 0.65, urine iodine 261 mg/g creatinine  6/5/19 g < 0.1, LETICIA < 0.4 -   TSH 5.5, free T4 1.03, Ca 8.7, phos 2.7, creatinine 0.72; on LT4 150 mcg/day  6/23/19 we increased LT4 to 150 * 7.5/week  7/18/19 Tg 0.74, Leticia < 0.4,  thyrogen stimulated  10/8/19: Tg < 0.5, LETICIA 0.7, TSH 0.71, free T4 1.47, Ca 8.6  1/9/2020 TSH < 0.01, free T4 1.64  10/21/2020 Tg < 0.5, LETICIA < 0.4, TSH 0.03, free T4 1.55  5/24/2021 Tg < 0.1, LETICIA < 0.4 , TSH < 0.01, free T4 1.52  1/27/2022 following appt Tg < 0.1, LETICIA < 0.4, TSH 0.02, free T4 1.41    I have reviewed images on PACS  3/14/19 CXR negative  4/10/19 thyroid and neck US:   Left superior # 1 0.3  X0.2  X 0.3   Left inferior # 2 0.4 x 0.2  X 0.3 cm   Right level 3 0.8 x0.4 x 1.4 Echogenic hilum  Left level 3 # 1 0.6 x 0.3 x 0.7 cm   Left level 5  Cystic mass 3.2 x 2.3 x 4.1 cm - internal mural area highly suspicious with echogenic foci 1.1 x 1 x 1 cm  4/10/19 CT neck with contrast:   Confirms the US   7/23/19 131I post therapy TBS neck uptake ; question of bilateral lung uptake - but " "there was no corresponding abnormality on SPECT   11/4/2020 neck US:   Right level 3 # 1 0.7 x 0.2 x 1.0   Right level 3 # 2 0.6 x 0.3 x 1.1   Left neck level 5 lower : 1 x 0.3 x 0.7 cm   1/31/2022 following appt neck US compared with 11/4/2020 neck US:   Right level 3 # 1 not seen was  0.7 x 0.2 x 1.0   Right level 3 # 2 not seen was 0.6 x 0.3 x 1.1   Left neck level 5 \"high\" 0.5 x 0.3 x 1.0 near surface   Left neck level 5 \"low\"  ? Not seen       REVIEW OF SYSTEMS  Energy level OK  Weight stable  Cardiac: some CP intermittently , not bad; last 20 years   Respiratory: negative  GI: negative  He has not had covid; he had covid vaccine x .   Should I continue to avoid dairy?     Past Medical History  Past Medical History:   Diagnosis Date     HTN (hypertension)      Papillary thyroid carcinoma (H) 03/25/2019     Post-surgical hypothyroidism 04/25/2019     Past Surgical History:   Procedure Laterality Date     DISSECTION RADICAL NECK MODIFIED N/A 4/25/2019    Procedure: Left Modified Radical Neck Dissection;  Surgeon: Yunier Rodriguez MD;  Location: [facility] OR     THYROIDECTOMY N/A 4/25/2019    Procedure: Total Thyroidectomy, Central Neck Dissection;  Surgeon: Yunier Rodriguez MD;  Location: [facility] OR     Medications    Current Outpatient Medications   Medication Sig Dispense Refill     acetaminophen (TYLENOL) 325 MG tablet Take 325-650 mg by mouth every 6 hours as needed for mild pain       levothyroxine (SYNTHROID/LEVOTHROID) 150 MCG tablet MON to SAT 1 tablet/day;SUN 1.5 tablet 192 tablet 1     losartan (COZAAR) 50 MG tablet Take 1 tablet (50 mg) by mouth daily 90 tablet 3     mineral oil-hydrophilic petrolatum (AQUAPHOR) external ointment Apply topically every 8 hours Apply to neck incision three times daily 50 g 1       Allergies  Allergies   Allergen Reactions     Penicillins Hives     Family History  Family History   Problem Relation Age of Onset     Hypertension Mother      Diabetes Brother      Thyroid Disease " "No family hx of      Thyroid Cancer No family hx of      Cancer No family hx of      Social History  Social History     Tobacco Use     Smoking status: Former Smoker     Smokeless tobacco: Never Used   Substance Use Topics     Alcohol use: Yes     Alcohol/week: 4.0 standard drinks     Types: 4 Cans of beer per week     Comment: once in awhile     Drug use: No       Physical Exam  There were no vitals taken for this visit.  There is no height or weight on file to calculate BMI.   BP Readings from Last 1 Encounters:   10/08/19 (!) 176/96      Pulse Readings from Last 1 Encounters:   10/08/19 63      Resp Readings from Last 1 Encounters:   07/18/19 16      Temp Readings from Last 1 Encounters:   08/06/19 98  F (36.7  C) (Oral)      SpO2 Readings from Last 1 Encounters:   07/18/19 94%      Wt Readings from Last 1 Encounters:   10/08/19 78.8 kg (173 lb 12.8 oz)      Ht Readings from Last 1 Encounters:   06/05/19 1.638 m (5' 4.5\")     GENERAL: no distress, I can  see from mid trunk up.  He is noving about the house location to location   SKIN: Visible skin clear. No significant rash, abnormal pigmentation or lesions.  EYES: Eyes grossly normal to inspection.  .  NECK: no visible masses;   RESP: No audible wheeze, cough, or visible cyanosis.  No visible retractions or increased work of breathing.    NEURO: Awake, alert, responds appropriately to questions.  Mentation and speech fluent.  PSYCH:affect normal, and appearance well-groomed.    DATA REVIEW  Results for LIAN CAMPOSRAVI AAKASH (MRN [id number]) as of 2/2/2022 06:29   Ref. Range 1/27/2022 13:45   T4 Free Latest Ref Range: 0.76 - 1.46 ng/dL 1.41   TSH Latest Ref Range: 0.40 - 4.00 mU/L 0.02 (L)     ENDO THYROID LABS-[facility] Latest Ref Rng & Units 5/24/2021 10/21/2020   TSH 0.40 - 4.00 mU/L <0.01 (L) 0.03 (L)   FREE T4 0.76 - 1.46 ng/dL 1.52 (H) 1.55 (H)     ENDO THYROID LABS[facility] Latest Ref Rng & Units 1/9/2020   TSH 0.40 - 4.00 mU/L <0.01 (L)   FREE T4 0.76 - 1.46 ng/dL " 1.64 (H)       Narrative & Impression   EXAMINATION: US HEAD NECK SOFT TISSUE, 1/31/2022 1:38 PM      COMPARISON: Ultrasound 11/4/2020     HISTORY: Papillary thyroid carcinoma. Postsurgical hypothyroidism     TECHNIQUE: Grayscale and color ultrasound imaging of the neck was  performed.     FINDINGS:     Lymph nodes are measured bilaterally with measurements given in  craniocaudal, transverse and AP dimensions as follows:     Right:  Level 1: No lymph nodes seen  Level 2: 1.9 x 1.3 x 0.6 cm lymph node with fatty hilum  Level 3: No lymph nodes seen  Level 4: No lymph nodes seen  Level 5: No lymph nodes seen  Level 6: No lymph nodes seen     Left:  Level 1: No lymph nodes seen  Level 2: No lymph nodes seen  Level 3: No lymph nodes seen  Level 4: No lymph nodes seen  Level 5: 1.0 x 0.5 x 0.3 cm lymph node with possible fatty hilum  unchanged from prior study.  Level 6: No lymph nodes seen                                                                      IMPRESSION:  1.  Soft tissue neck ultrasound with lymph node measurements as  described above. No suspicious appearing lymph nodes.     I have personally reviewed the examination and initial interpretation  and I agree with the findings.     CHUCK HOPSON MD

## 2022-01-26 NOTE — LETTER
Date:February 2, 2022      Provider requested that no letter be sent. Do not send.       [facility]

## 2022-01-26 NOTE — PROGRESS NOTES
Price is a 71 year old who is being evaluated via a billable video visit.      Needs a refill on losartan.    How would you like to obtain your AVS? MyChart  If the video visit is dropped, the invitation should be resent by: Text to cell phone: [phone]   Will anyone else be joining your video visit? No

## 2022-01-27 ENCOUNTER — LAB (OUTPATIENT)
Dept: LAB | Facility: CLINIC | Age: 72
End: 2022-01-27
Payer: MEDICARE

## 2022-01-27 DIAGNOSIS — E89.0 POST-SURGICAL HYPOTHYROIDISM: ICD-10-CM

## 2022-01-27 DIAGNOSIS — C73 PAPILLARY THYROID CARCINOMA (H): ICD-10-CM

## 2022-01-27 LAB
T4 FREE SERPL-MCNC: 1.41 NG/DL (ref 0.76–1.46)
TSH SERPL DL<=0.005 MIU/L-ACNC: 0.02 MU/L (ref 0.4–4)

## 2022-01-27 PROCEDURE — 84439 ASSAY OF FREE THYROXINE: CPT | Performed by: PATHOLOGY

## 2022-01-27 PROCEDURE — 84432 ASSAY OF THYROGLOBULIN: CPT

## 2022-01-27 PROCEDURE — 36415 COLL VENOUS BLD VENIPUNCTURE: CPT | Performed by: PATHOLOGY

## 2022-01-27 PROCEDURE — 84443 ASSAY THYROID STIM HORMONE: CPT | Performed by: PATHOLOGY

## 2022-01-27 PROCEDURE — 86800 THYROGLOBULIN ANTIBODY: CPT

## 2022-01-31 ENCOUNTER — ANCILLARY PROCEDURE (OUTPATIENT)
Dept: ULTRASOUND IMAGING | Facility: CLINIC | Age: 72
End: 2022-01-31
Payer: MEDICARE

## 2022-01-31 DIAGNOSIS — C73 PAPILLARY THYROID CARCINOMA (H): ICD-10-CM

## 2022-01-31 DIAGNOSIS — E89.0 POST-SURGICAL HYPOTHYROIDISM: ICD-10-CM

## 2022-01-31 PROCEDURE — 76536 US EXAM OF HEAD AND NECK: CPT | Performed by: RADIOLOGY

## 2022-02-02 PROBLEM — Z60.3 IMMIGRANT WITH LANGUAGE DIFFICULTY: Status: ACTIVE | Noted: 2022-02-02

## 2022-02-07 LAB — SCANNED LAB RESULT: NORMAL

## 2022-03-25 ENCOUNTER — APPOINTMENT (OUTPATIENT)
Dept: INTERPRETER SERVICES | Facility: CLINIC | Age: 72
End: 2022-03-25
Payer: MEDICARE

## 2022-12-26 ENCOUNTER — LAB (OUTPATIENT)
Dept: LAB | Facility: CLINIC | Age: 72
End: 2022-12-26
Payer: MEDICARE

## 2022-12-26 DIAGNOSIS — E89.0 POST-SURGICAL HYPOTHYROIDISM: ICD-10-CM

## 2022-12-26 DIAGNOSIS — C73 PAPILLARY THYROID CARCINOMA (H): ICD-10-CM

## 2022-12-26 LAB
T4 FREE SERPL-MCNC: 1.81 NG/DL (ref 0.9–1.7)
TSH SERPL DL<=0.005 MIU/L-ACNC: 0.02 UIU/ML (ref 0.3–4.2)

## 2022-12-26 PROCEDURE — 84439 ASSAY OF FREE THYROXINE: CPT | Performed by: PATHOLOGY

## 2022-12-26 PROCEDURE — 36415 COLL VENOUS BLD VENIPUNCTURE: CPT | Performed by: PATHOLOGY

## 2022-12-26 PROCEDURE — 84443 ASSAY THYROID STIM HORMONE: CPT | Performed by: PATHOLOGY

## 2022-12-26 PROCEDURE — 84432 ASSAY OF THYROGLOBULIN: CPT

## 2022-12-26 PROCEDURE — 86800 THYROGLOBULIN ANTIBODY: CPT

## 2023-01-04 ENCOUNTER — VIRTUAL VISIT (OUTPATIENT)
Dept: ENDOCRINOLOGY | Facility: CLINIC | Age: 73
End: 2023-01-04
Payer: MEDICARE

## 2023-01-04 DIAGNOSIS — C73 PAPILLARY THYROID CARCINOMA (H): Primary | ICD-10-CM

## 2023-01-04 DIAGNOSIS — E89.0 POST-SURGICAL HYPOTHYROIDISM: ICD-10-CM

## 2023-01-04 PROCEDURE — 99214 OFFICE O/P EST MOD 30 MIN: CPT | Mod: 95

## 2023-01-04 RX ORDER — LEVOTHYROXINE SODIUM 150 UG/1
TABLET ORAL
Qty: 192 TABLET | Refills: 1 | Status: SHIPPED | OUTPATIENT
Start: 2023-01-04 | End: 2023-12-18

## 2023-01-04 NOTE — PATIENT INSTRUCTIONS
The thyroglobulin is pending.  I will let you know when the results come in       Next labs may be January 2024 about 3 weeks prior to your next yearly visit with me .  Neck ultrasound 2024 just prior to your next visit with me    Make sure you continue to see your primary care doctor for the blood pressure  and other primary care

## 2023-01-04 NOTE — LETTER
Date:January 5, 2023      Provider requested that no letter be sent. Do not send.       [facility]

## 2023-01-04 NOTE — LETTER
1/4/2023       RE: Price Rene  [address]   [phone]  [facility] [address]     Dear Colleague,    Thank you for referring your patient, Price Rene, to the [facility] [place] at [facility]. Please see a copy of my visit note below.    Endocrinology video visit  note    Attending Assessment/Plan :     Papillary thyroid carcinoma (H)  mulfifocal, bilateral, subcm primary (4 foci, TTD not possible from path report data),17 LN + with bulky size (up to 4.4 cm) and TOR.  He has been treated with total Tx, CND and left LND and adjuvant 131I .   pT1a, pN1b, pMx, cMx, stage 2 minimum  MACIS 5.72 assuming complete resection and no distant mets  HOWARD recurrence risk HIGH due to # of involved LN, size of involved LN, TOR  Tg is pending   See me yearly -- orders for 2024 labs placed.     Post-surgical hypothyroidismTreat to target TSH < 0.4 due to the thyroid cancer.    He is currently on LT4  150 * 7.5/week, divided.   Refilled LT4 at present dose, once again 6 month supply for travel     Lung uptake on post therapy TBS from 7/23/19.  No corresponding abnormality on SPECT  Radiologist believes this was artifact. I am placing on my list so I don't forget about it.      Immigrant with language barrier.      Due to the COVID 19 pandemic this visit was a video visit. The patient gave verbal consent for the visit today.    I have independently reviewed and interpreted labs, imaging as indicated.   Distant Location (provider location):  Off-site  Mode of Communication:  Video Conference via [employer]  Chart review/prep time 1  [id number]  Visit Start time 1610   Visit Stop time  1620   21__ minutes spent on the date of the encounter doing chart review, history and exam, documentation and further activities as noted above.    Yanira Uribe MD      Chief complaint/ HISTORY OF PRESENT ILLNESS  Price presents  todayfor follow up of thyroid cancer, post surgical hypothyroidism.  He was seen by me along with .  I last saw him 1/2022.      Left neck mass FNAB was read as positive for papillary thyroid carcinoma.  Imaging did not clearly show  a thyroid primary .   Thyroid cancer  treatment has been as follows:   4/25/19 total thyroidectomy, left central and modified radical neck dissection levels 2-4.  Right upper parathyroid autotx into left SCM (Dr Yunier Rodriguez) removing  papillary thyroid carcinoma, bilateral, largest 0.4 cm left (total tumor diameter not possible from path report), + LN 17/55 (12/26 CND, largest tumor focus 0.4 cm) with + TOR, largest tumor met 4.4 cm left level 2A, but tumor also in level 3 and 4 LNs  7/18/19 2 dose thyrogen stimulated 153 mCi 131I.        We have the following labs  4/8/19: Tg 8.1, LETICIA < 0.4,  TSH 1.2  4/25/19 Ca 8.4, PTH 28  4/26/19 Ca 7.8, creatinine 0.71  5/8/19 TSH 2.88, Ca 8.9, phos 3.3, creatinine 0.65, urine iodine 261 mg/g creatinine  6/5/19 g < 0.1, LETICIA < 0.4 -   TSH 5.5, free T4 1.03, Ca 8.7, phos 2.7, creatinine 0.72; on LT4 150 mcg/day  6/23/19 we increased LT4 to 150 * 7.5/week  7/18/19 Tg 0.74, Leticia < 0.4,  thyrogen stimulated  10/8/19: Tg < 0.5, LETICIA 0.7, TSH 0.71, free T4 1.47, Ca 8.6  1/9/2020 TSH < 0.01, free T4 1.64  10/21/2020 Tg < 0.5, LETICIA < 0.4, TSH 0.03, free T4 1.55  5/24/2021 Tg < 0.1, LETICIA < 0.4 , TSH < 0.01, free T4 1.52  1/27/2022  Tg < 0.1, LETICIA < 0.4, TSH 0.02, free T4 1.41  12/26/22 Tg pending, TSH 0.02, free T4 1.81    I have reviewed images on PACS  3/14/19 CXR negative  4/10/19 CT neck with contrast:   Confirms the US   7/23/19 131I post therapy TBS neck uptake ; question of bilateral lung uptake - but there was no corresponding abnormality on SPECT   1/31/2022 following appt neck US compared with 11/4/2020 neck US:   Right level 3 # 1 not seen was  0.7 x 0.2 x 1.0   Right level 3 # 2 not seen was 0.6 x 0.3 x 1.1   Left neck  "level 5 \"high\" 0.5 x 0.3 x 1.0 near surface   Left neck level 5 \"low\"  ? Not seen       REVIEW OF SYSTEMS  Doing good  Energy good   Sleep good   Appetite OK;   Weight stable   Cardiac: negative; rarely palpitations ; last BP 4-5 months   Respiratory: negative  GI: negative     Past Medical History  Past Medical History:   Diagnosis Date     HTN (hypertension)      Papillary thyroid carcinoma (H) 03/25/2019     Post-surgical hypothyroidism 04/25/2019     Past Surgical History:   Procedure Laterality Date     DISSECTION RADICAL NECK MODIFIED N/A 4/25/2019    Procedure: Left Modified Radical Neck Dissection;  Surgeon: Yunier Rodriguez MD;  Location: [facility] OR     THYROIDECTOMY N/A 4/25/2019    Procedure: Total Thyroidectomy, Central Neck Dissection;  Surgeon: Yunier Rodriguez MD;  Location: [facility] OR     Medications    Current Outpatient Medications   Medication Sig Dispense Refill     acetaminophen (TYLENOL) 325 MG tablet Take 325-650 mg by mouth every 6 hours as needed for mild pain       levothyroxine (SYNTHROID/LEVOTHROID) 150 MCG tablet MON to SAT 1 tablet/day;SUN 1.5 tablet 192 tablet 1     losartan (COZAAR) 50 MG tablet Take 1 tablet (50 mg) by mouth daily 90 tablet 3     mineral oil-hydrophilic petrolatum (AQUAPHOR) external ointment Apply topically every 8 hours Apply to neck incision three times daily 50 g 1       Allergies  Allergies   Allergen Reactions     Penicillins Hives     Family History  Family History   Problem Relation Age of Onset     Hypertension Mother      Diabetes Brother      Thyroid Disease No family hx of      Thyroid Cancer No family hx of      Cancer No family hx of      Social History  Social History     Tobacco Use     Smoking status: Former     Smokeless tobacco: Never   Substance Use Topics     Alcohol use: Yes     Alcohol/week: 4.0 standard drinks     Types: 4 Cans of beer per week     Comment: once in awhile     Drug use: No     Exercise:was  snow shoveling today    Physical " "Exam  There were no vitals taken for this visit.  There is no height or weight on file to calculate BMI.   BP Readings from Last 1 Encounters:   10/08/19 (!) 176/96      Pulse Readings from Last 1 Encounters:   10/08/19 63      Resp Readings from Last 1 Encounters:   07/18/19 16      Temp Readings from Last 1 Encounters:   08/06/19 98  F (36.7  C) (Oral)      SpO2 Readings from Last 1 Encounters:   07/18/19 94%      Wt Readings from Last 1 Encounters:   10/08/19 78.8 kg (173 lb 12.8 oz)      Ht Readings from Last 1 Encounters:   06/05/19 1.638 m (5' 4.5\")     GENERAL: no distress, I can  see from upper  trunk up.  He appears to have on winter coat; I can see family members walking by in the background  SKIN: Visible skin clear. No significant rash, abnormal pigmentation or lesions.  EYES: glasses; Eyes grossly normal to inspection.  .  NECK: no visible masses;   RESP: No audible wheeze, cough, or v increased work of breathing.    NEURO: Awake, alert, responds appropriately to questions.  Mentation and speech fluent.  PSYCH:affect normal, and appearance well-groomed.    DATA REVIEW    ENDO THYROID LABS-[facility] Latest Ref Rng & Units 12/26/2022 1/27/2022   TSH 0.30 - 4.20 uIU/mL 0.02 (L) 0.02 (L)   FREE T4 0.90 - 1.70 ng/dL 1.81 (H) 1.41       Price Rene is being evaluated via a billable video visit.        How would you like to obtain your AVS? MyChart  For the video visit, send the invitation by: Text to cell phone: [phone]        Again, thank you for allowing me to participate in the care of your patient.      Sincerely,    Yanira Uribe MD      "

## 2023-01-04 NOTE — PROGRESS NOTES
Endocrinology video visit  note    Attending Assessment/Plan :     Papillary thyroid carcinoma (H)  mulfifocal, bilateral, subcm primary (4 foci, TTD not possible from path report data),17 LN + with bulky size (up to 4.4 cm) and TOR.  He has been treated with total Tx, CND and left LND and adjuvant 131I .   pT1a, pN1b, pMx, cMx, stage 2 minimum  MACIS 5.72 assuming complete resection and no distant mets  HOWARD recurrence risk HIGH due to # of involved LN, size of involved LN, TOR  Tg is pending   See me yearly -- orders for 2024 labs placed.     Post-surgical hypothyroidismTreat to target TSH < 0.4 due to the thyroid cancer.    He is currently on LT4  150 * 7.5/week, divided.   Refilled LT4 at present dose, once again 6 month supply for travel     Lung uptake on post therapy TBS from 7/23/19.  No corresponding abnormality on SPECT  Radiologist believes this was artifact. I am placing on my list so I don't forget about it.      Immigrant with language barrier.      Due to the COVID 19 pandemic this visit was a video visit. The patient gave verbal consent for the visit today.    I have independently reviewed and interpreted labs, imaging as indicated.   Distant Location (provider location):  Off-site  Mode of Communication:  Video Conference via [employer]  Chart review/prep time 1  [id number]  Visit Start time 1610   Visit Stop time  1620   21__ minutes spent on the date of the encounter doing chart review, history and exam, documentation and further activities as noted above.    Yanira Uribe MD      Chief complaint/ HISTORY OF PRESENT ILLNESS  Price presents todayfor follow up of thyroid cancer, post surgical hypothyroidism.  He was seen by me along with .  I last saw him 1/2022.      Left neck mass FNAB was read as positive for papillary thyroid carcinoma.  Imaging did not clearly show  a thyroid primary .   Thyroid cancer  treatment has been as follows:   4/25/19 total thyroidectomy, left  "central and modified radical neck dissection levels 2-4.  Right upper parathyroid autotx into left SCM (Dr Yunier Rodriguez) removing  papillary thyroid carcinoma, bilateral, largest 0.4 cm left (total tumor diameter not possible from path report), + LN 17/55 (12/26 CND, largest tumor focus 0.4 cm) with + TOR, largest tumor met 4.4 cm left level 2A, but tumor also in level 3 and 4 LNs  7/18/19 2 dose thyrogen stimulated 153 mCi 131I.        We have the following labs  4/8/19: Tg 8.1, LETICIA < 0.4,  TSH 1.2  4/25/19 Ca 8.4, PTH 28  4/26/19 Ca 7.8, creatinine 0.71  5/8/19 TSH 2.88, Ca 8.9, phos 3.3, creatinine 0.65, urine iodine 261 mg/g creatinine  6/5/19 g < 0.1, LETICIA < 0.4 -   TSH 5.5, free T4 1.03, Ca 8.7, phos 2.7, creatinine 0.72; on LT4 150 mcg/day  6/23/19 we increased LT4 to 150 * 7.5/week  7/18/19 Tg 0.74, Leticia < 0.4,  thyrogen stimulated  10/8/19: Tg < 0.5, LETICIA 0.7, TSH 0.71, free T4 1.47, Ca 8.6  1/9/2020 TSH < 0.01, free T4 1.64  10/21/2020 Tg < 0.5, LETICIA < 0.4, TSH 0.03, free T4 1.55  5/24/2021 Tg < 0.1, LETICIA < 0.4 , TSH < 0.01, free T4 1.52  1/27/2022  Tg < 0.1, LETICIA < 0.4, TSH 0.02, free T4 1.41  12/26/22 Tg pending, TSH 0.02, free T4 1.81    I have reviewed images on PACS  3/14/19 CXR negative  4/10/19 CT neck with contrast:   Confirms the US   7/23/19 131I post therapy TBS neck uptake ; question of bilateral lung uptake - but there was no corresponding abnormality on SPECT   1/31/2022 following appt neck US compared with 11/4/2020 neck US:   Right level 3 # 1 not seen was  0.7 x 0.2 x 1.0   Right level 3 # 2 not seen was 0.6 x 0.3 x 1.1   Left neck level 5 \"high\" 0.5 x 0.3 x 1.0 near surface   Left neck level 5 \"low\"  ? Not seen       REVIEW OF SYSTEMS  Doing good  Energy good   Sleep good   Appetite OK;   Weight stable   Cardiac: negative; rarely palpitations ; last BP 4-5 months   Respiratory: negative  GI: negative     Past Medical History  Past Medical History:   Diagnosis Date     HTN " (hypertension)      Papillary thyroid carcinoma (H) 03/25/2019     Post-surgical hypothyroidism 04/25/2019     Past Surgical History:   Procedure Laterality Date     DISSECTION RADICAL NECK MODIFIED N/A 4/25/2019    Procedure: Left Modified Radical Neck Dissection;  Surgeon: Yunier Rodriguez MD;  Location: [facility] OR     THYROIDECTOMY N/A 4/25/2019    Procedure: Total Thyroidectomy, Central Neck Dissection;  Surgeon: Yunier Rodriguez MD;  Location: [facility] OR     Medications    Current Outpatient Medications   Medication Sig Dispense Refill     acetaminophen (TYLENOL) 325 MG tablet Take 325-650 mg by mouth every 6 hours as needed for mild pain       levothyroxine (SYNTHROID/LEVOTHROID) 150 MCG tablet MON to SAT 1 tablet/day;SUN 1.5 tablet 192 tablet 1     losartan (COZAAR) 50 MG tablet Take 1 tablet (50 mg) by mouth daily 90 tablet 3     mineral oil-hydrophilic petrolatum (AQUAPHOR) external ointment Apply topically every 8 hours Apply to neck incision three times daily 50 g 1       Allergies  Allergies   Allergen Reactions     Penicillins Hives     Family History  Family History   Problem Relation Age of Onset     Hypertension Mother      Diabetes Brother      Thyroid Disease No family hx of      Thyroid Cancer No family hx of      Cancer No family hx of      Social History  Social History     Tobacco Use     Smoking status: Former     Smokeless tobacco: Never   Substance Use Topics     Alcohol use: Yes     Alcohol/week: 4.0 standard drinks     Types: 4 Cans of beer per week     Comment: once in awhile     Drug use: No     Exercise:was  snow shoveling today    Physical Exam  There were no vitals taken for this visit.  There is no height or weight on file to calculate BMI.   BP Readings from Last 1 Encounters:   10/08/19 (!) 176/96      Pulse Readings from Last 1 Encounters:   10/08/19 63      Resp Readings from Last 1 Encounters:   07/18/19 16      Temp Readings from Last 1 Encounters:   08/06/19 98  F (36.7  C) (Oral)  "     SpO2 Readings from Last 1 Encounters:   07/18/19 94%      Wt Readings from Last 1 Encounters:   10/08/19 78.8 kg (173 lb 12.8 oz)      Ht Readings from Last 1 Encounters:   06/05/19 1.638 m (5' 4.5\")     GENERAL: no distress, I can  see from upper  trunk up.  He appears to have on winter coat; I can see family members walking by in the background  SKIN: Visible skin clear. No significant rash, abnormal pigmentation or lesions.  EYES: glasses; Eyes grossly normal to inspection.  .  NECK: no visible masses;   RESP: No audible wheeze, cough, or v increased work of breathing.    NEURO: Awake, alert, responds appropriately to questions.  Mentation and speech fluent.  PSYCH:affect normal, and appearance well-groomed.    DATA REVIEW    ENDO THYROID LABS-[facility] Latest Ref Rng & Units 12/26/2022 1/27/2022   TSH 0.30 - 4.20 uIU/mL 0.02 (L) 0.02 (L)   FREE T4 0.90 - 1.70 ng/dL 1.81 (H) 1.41     "

## 2023-01-04 NOTE — PROGRESS NOTES
Price Rene is being evaluated via a billable video visit.        How would you like to obtain your AVS? [employer]  For the video visit, send the invitation by: Text to cell phone: [phone]

## 2023-01-11 LAB — SCANNED LAB RESULT: NORMAL

## 2023-05-05 ENCOUNTER — TELEPHONE (OUTPATIENT)
Dept: ENDOCRINOLOGY | Facility: CLINIC | Age: 73
End: 2023-05-05
Payer: MEDICARE

## 2023-05-05 ENCOUNTER — APPOINTMENT (OUTPATIENT)
Dept: INTERPRETER SERVICES | Facility: CLINIC | Age: 73
End: 2023-05-05
Payer: MEDICARE

## 2023-05-05 NOTE — TELEPHONE ENCOUNTER
[facility] Call Center    Phone Message    May a detailed message be left on voicemail: yes     Reason for Call: Other: Kisha Dtr wants to confirm with Dr. Santos, will her father need to schedule an imaing visit  this year?     Action Taken: Other: endo    Travel Screening: Not Applicable

## 2023-05-05 NOTE — TELEPHONE ENCOUNTER
Spoke with daughter via  . Clinic coordinators asked to call her today to set up follow up with Dr Uribe 1/2024.  He will need labs 3 weeks before  Whatever is scheduled and an ultrasound. I will  Ave Dr Uribe place orders once follow up is in. Sofie Beltran RN on 5/5/2023 at 3:55 PM

## 2023-05-05 NOTE — TELEPHONE ENCOUNTER
Patient call:     Appointment type: return thyroid cancer   Provider: Rony   Return date:Jan. 2024   Speciality phone number: [phone]  Additional appointment(s) needed:   Additional notes: LVM with   Lauro Singh on 5/5/2023 at 4:40 PM

## 2023-05-12 DIAGNOSIS — C73 PAPILLARY THYROID CARCINOMA (H): Primary | ICD-10-CM

## 2023-05-12 DIAGNOSIS — E89.0 POST-SURGICAL HYPOTHYROIDISM: ICD-10-CM

## 2023-05-16 ENCOUNTER — TELEPHONE (OUTPATIENT)
Dept: ENDOCRINOLOGY | Facility: CLINIC | Age: 73
End: 2023-05-16
Payer: MEDICARE

## 2023-05-16 NOTE — TELEPHONE ENCOUNTER
Can you see if  Dr Uribe has a RETURN Thyroid cancer week of 12/18 /23 so his lab and imaging can be reviewed. The January one is a bit of disconnect  fom the 11/30 appointments  to discuss results Sofie Beltran RN on 5/16/2023 at 10:22 AM

## 2023-05-16 NOTE — TELEPHONE ENCOUNTER
----- Message from Yanira Uribe MD sent at 5/12/2023 10:20 AM CDT -----  Regarding: RE: orders needed  Ideally we would schedule first and then add orders that are linked in time to the appt, about 2-3 weeks prior, rather than the disconnection that occurs when we do it the other way around.  Let me know where there is an appt date and I can add orders after that.    Why wasn't he scheduled at the time of the January appt for January 2024?  If he had been, we ideally wouldn't have as much rework to do.      ----- Message -----  From: Sofie Beltran RN  Sent: 5/12/2023   7:43 AM CDT  To: Yanira Uribe MD  Subject: FW: orders needed                                Need [facility] orders for 1/2024 follow up  being  worked on for scheduling   ----- Message -----  From: Sofie Beltran RN  Sent: 5/12/2023  12:00 AM CDT  To: Sofie Beltran RN  Subject: orders needed                                    Look for follow up with Rony then get labs and imaging orders to be done 3 weeks before  visit

## 2023-05-17 NOTE — TELEPHONE ENCOUNTER
Patient call:   LVM with daughter   Appointment type: return thyroid cancer   Provider: Rony   Return date: schedule sooner appt than Jan 2024   Speciality phone number: [phone]  Additional appointment(s) needed:   Additional notes: can schedule the WEEK of December 18th, can use [facility]; do not schedule in slot if pt is already there, [facility] will need to be manually scheduled because they do not show in solutions   Lauro Singh on 5/17/2023 at 5:44 PM

## 2023-11-30 ENCOUNTER — LAB (OUTPATIENT)
Dept: LAB | Facility: CLINIC | Age: 73
End: 2023-11-30
Payer: MEDICARE

## 2023-11-30 ENCOUNTER — ANCILLARY PROCEDURE (OUTPATIENT)
Dept: ULTRASOUND IMAGING | Facility: CLINIC | Age: 73
End: 2023-11-30
Payer: MEDICARE

## 2023-11-30 DIAGNOSIS — E89.0 POST-SURGICAL HYPOTHYROIDISM: ICD-10-CM

## 2023-11-30 DIAGNOSIS — C73 PAPILLARY THYROID CARCINOMA (H): ICD-10-CM

## 2023-11-30 LAB
T4 FREE SERPL-MCNC: 2.32 NG/DL (ref 0.9–1.7)
TSH SERPL DL<=0.005 MIU/L-ACNC: 0.01 UIU/ML (ref 0.3–4.2)

## 2023-11-30 PROCEDURE — 36415 COLL VENOUS BLD VENIPUNCTURE: CPT | Performed by: PATHOLOGY

## 2023-11-30 PROCEDURE — 86800 THYROGLOBULIN ANTIBODY: CPT

## 2023-11-30 PROCEDURE — 84443 ASSAY THYROID STIM HORMONE: CPT | Performed by: PATHOLOGY

## 2023-11-30 PROCEDURE — 76536 US EXAM OF HEAD AND NECK: CPT | Mod: GC | Performed by: STUDENT IN AN ORGANIZED HEALTH CARE EDUCATION/TRAINING PROGRAM

## 2023-11-30 PROCEDURE — 84439 ASSAY OF FREE THYROXINE: CPT | Performed by: PATHOLOGY

## 2023-11-30 PROCEDURE — 84432 ASSAY OF THYROGLOBULIN: CPT

## 2023-12-05 LAB — SCANNED LAB RESULT: NORMAL

## 2023-12-18 ENCOUNTER — VIRTUAL VISIT (OUTPATIENT)
Dept: ENDOCRINOLOGY | Facility: CLINIC | Age: 73
End: 2023-12-18
Payer: MEDICARE

## 2023-12-18 DIAGNOSIS — E89.0 POST-SURGICAL HYPOTHYROIDISM: ICD-10-CM

## 2023-12-18 DIAGNOSIS — C73 PAPILLARY THYROID CARCINOMA (H): ICD-10-CM

## 2023-12-18 DIAGNOSIS — Z60.3 IMMIGRANT WITH LANGUAGE DIFFICULTY: Primary | ICD-10-CM

## 2023-12-18 PROCEDURE — 99215 OFFICE O/P EST HI 40 MIN: CPT | Mod: VID

## 2023-12-18 RX ORDER — LEVOTHYROXINE SODIUM 150 UG/1
150 TABLET ORAL DAILY
Qty: 180 TABLET | Refills: 1 | Status: SHIPPED | OUTPATIENT
Start: 2023-12-18 | End: 2024-03-14

## 2023-12-18 RX ORDER — ATORVASTATIN CALCIUM 40 MG/1
40 TABLET, FILM COATED ORAL DAILY
COMMUNITY
Start: 2023-08-08

## 2023-12-18 RX ORDER — TELMISARTAN 40 MG/1
40 TABLET ORAL DAILY
COMMUNITY
Start: 2023-08-07

## 2023-12-18 RX ORDER — HYDROCHLOROTHIAZIDE 25 MG/1
1 TABLET ORAL DAILY
COMMUNITY
Start: 2023-08-18

## 2023-12-18 RX ORDER — LEVOTHYROXINE SODIUM 150 UG/1
150 TABLET ORAL DAILY
Qty: 192 TABLET | Refills: 1 | Status: SHIPPED | OUTPATIENT
Start: 2023-12-18 | End: 2023-12-18

## 2023-12-18 SDOH — SOCIAL STABILITY - SOCIAL INSECURITY: ACCULTURATION DIFFICULTY: Z60.3

## 2023-12-18 NOTE — NURSING NOTE
Is the patient currently in the state of [place]? YES    Visit mode:VIDEO    If the visit is dropped, the patient can be reconnected by: VIDEO VISIT: Text to cell phone:   Telephone Information:   Mobile [phone]   [phone]    Will anyone else be joining the visit?  Services, Kisha  (If patient encounters technical issues they should call [phone])    How would you like to obtain your AVS? MyChart    Are changes needed to the allergy or medication list? No    Reason for visit: RECHECK (Patient notes he is feeling ok )    Aretha CISNEROS

## 2023-12-18 NOTE — PATIENT INSTRUCTIONS
Reduce levothyroxine to 150 mcg/day, every day the same    Labs in mid March 2024    Next labs about 3 weeks prior to your next yearly visit with me .    Phone number [phone]     Next neck ultrasound 2027 or sooner if needed    Make sure you continue to see your primary care doctor for the blood pressure  and other primary care

## 2023-12-18 NOTE — PROGRESS NOTES
Endocrinology video visit      Attending Assessment/Plan :     Papillary thyroid carcinoma (H)  mulfifocal, bilateral, subcm primary (4 foci, TTD not possible from path report data),17 LN + with bulky size (up to 4.4 cm) and TOR.  He has been treated with total Tx, CND and left LND and adjuvant 131I .   pT1a, pN1b, pMx, cMx, stage 2 minimum  MACIS 5.72 assuming complete resection and no distant mets  HOWARD recurrence risk HIGH due to # of involved LN, size of involved LN, TOR  See me yearly --     Post-surgical hypothyroidismTreat to target TSH < 0.4 due to the thyroid cancer.    He is currently on LT4  150 * 7.5/week, divided.  Reduce to 150 mcg/day.    Labs in March 2024.     Addendum  3/11/24 TSH 0.01, free T4 1.82.      Lung uptake on post therapy TBS from 7/23/19.  No corresponding abnormality on SPECT  Radiologist believes this was artifact. I am placing on my list so I don't forget about it.      Immigrant with language barrier.   Medication management issue is apparent today .       Due to the continued risks of COVID 19 transmission and to improve ease of access this visit was a video visit. The patient gave verbal consent for the visit today.    I have independently reviewed and interpreted labs, imaging as indicated.     Distant Location (provider location):  Off-site  Mode of Communication:  Video Conference via [employer].  We had significant problems with audio and video trying to room.  Rooming too a very long time  requiring 2 interpreters, several interactions about technology.   Chart review/prep time 1  [id number]  Visit Start time  1622  Visit Stop time 1654   _49_ minutes spent on the date of the encounter doing chart review, history and exam, documentation and further activities as noted above.      Yanira Uribe MD      Chief complaint/ HISTORY OF PRESENT ILLNESS  Price presents for follow up of thyroid cancer, post surgical hypothyroidism.  He was seen by me along with faith   who is on the line.  His daughter is at his side.   I last saw him 1/2023.  At that time he was  on LT4  150 * 7.5/week, divided.  Today he initially said he continues on this .  Later he said he ran out about 1.5 months ago.  Later they got the pill bottles and read them to me, confirming he had the levothyroxine at the prescribed dose.   He already had labs and US in anticipation of this appt.      Left neck mass FNAB was read as positive for papillary thyroid carcinoma.  Imaging did not clearly show  a thyroid primary .   Thyroid cancer  treatment has been as follows:   4/25/19 total thyroidectomy, left central and modified radical neck dissection levels 2-4.  Right upper parathyroid autotx into left SCM (Dr Yunier Rodriguez) removing  papillary thyroid carcinoma, bilateral, largest 0.4 cm left (total tumor diameter not possible from path report), + LN 17/55 (12/26 CND, largest tumor focus 0.4 cm) with + TOR, largest tumor met 4.4 cm left level 2A, but tumor also in level 3 and 4 LNs  7/18/19 2 dose thyrogen stimulated 153 mCi 131I.        We have the following labs  4/8/19: Tg 8.1, LETICIA < 0.4,  TSH 1.2  4/25/19 Ca 8.4, PTH 28  4/26/19 Ca 7.8, creatinine 0.71  5/8/19 TSH 2.88, Ca 8.9, phos 3.3, creatinine 0.65, urine iodine 261 mg/g creatinine  6/5/19 g < 0.1, LETICIA < 0.4 -   TSH 5.5, free T4 1.03, Ca 8.7, phos 2.7, creatinine 0.72; on LT4 150 mcg/day  6/23/19 we increased LT4 to 150 * 7.5/week  7/18/19 Tg 0.74, Leticia < 0.4,  thyrogen stimulated  10/8/19: Tg < 0.5, LETICIA 0.7, TSH 0.71, free T4 1.47, Ca 8.6  1/9/2020 TSH < 0.01, free T4 1.64  10/21/2020 Tg < 0.5, LETICIA < 0.4, TSH 0.03, free T4 1.55  5/24/2021 Tg < 0.1, LETICIA < 0.4 , TSH < 0.01, free T4 1.52  1/27/2022  Tg < 0.1, LETICIA < 0.4, TSH 0.02, free T4 1.41  12/26/22 Tg < 0.1, LETICIA < 0.4, TSH 0.02, free T4 1.81  11/30/23 Tg < 0.1, LETICIA < 0.4 , LETICIA , TSH 0.01, free T4 2.32    I have reviewed images on PACS  3/14/19 CXR negative  4/10/19 CT neck with  contrast:   Confirms the US   7/23/19 131I post therapy TBS neck uptake ; question of bilateral lung uptake - but there was no corresponding abnormality on SPECT   1130/23 neck US compared with 1/31/2022 , 11/4/2020 neck US:   Right level 3 # 1 not seen was  0.7 x 0.2 x 1.0   Right level 3 # 2 not seen was 0.6 x 0.3 x 1.1       REVIEW OF SYSTEMS  Feels fine  No problems  Energy OK  Sleep OK  Getting vitals at [facility] - where his PCP is  Cardiac: negative;   Respiratory: negative  GI: negative     Past Medical History  Past Medical History:   Diagnosis Date    Helicobacter pylori antibody positive     HTN (hypertension)     Hyperlipidemia     Papillary thyroid carcinoma (H) 03/25/2019    Post-surgical hypothyroidism 04/25/2019     Past Surgical History:   Procedure Laterality Date    DISSECTION RADICAL NECK MODIFIED N/A 4/25/2019    Procedure: Left Modified Radical Neck Dissection;  Surgeon: Yunier Rodriguez MD;  Location: [facility] OR    THYROIDECTOMY N/A 4/25/2019    Procedure: Total Thyroidectomy, Central Neck Dissection;  Surgeon: Yunier Rodriguez MD;  Location: [facility] OR     Medications    Current Outpatient Medications   Medication Sig Dispense Refill    atorvastatin (LIPITOR) 40 MG tablet Take 40 mg by mouth daily      hydrochlorothiazide (HYDRODIURIL) 25 MG tablet Take 1 tablet by mouth daily      levothyroxine (SYNTHROID/LEVOTHROID) 150 MCG tablet Take 1 tablet (150 mcg) by mouth daily 180 tablet 1    telmisartan (MICARDIS) 40 MG tablet Take 40 mg by mouth daily      acetaminophen (TYLENOL) 325 MG tablet Take 325-650 mg by mouth every 6 hours as needed for mild pain (Patient not taking: Reported on 1/4/2023)      losartan (COZAAR) 50 MG tablet Take 1 tablet (50 mg) by mouth daily 90 tablet 3    mineral oil-hydrophilic petrolatum (AQUAPHOR) external ointment Apply topically every 8 hours Apply to neck incision three times daily (Patient not taking: Reported on 1/4/2023) 50 g 1     Doesn't know what  "his med is for      Allergies  Allergies   Allergen Reactions    Penicillins Hives     Family History  Family History   Problem Relation Age of Onset    Hypertension Mother     Diabetes Brother     Hypertension Brother     Thyroid Disease No family hx of     Thyroid Cancer No family hx of     Cancer No family hx of      Social History  Social History     Tobacco Use    Smoking status: Former    Smokeless tobacco: Never   Substance Use Topics    Alcohol use: Yes     Alcohol/week: 4.0 standard drinks of alcohol     Types: 4 Cans of beer per week     Comment: once in awhile    Drug use: No     PCP is at [facility] - last seen 8/18/23     Physical Exam  There were no vitals taken for this visit.  There is no height or weight on file to calculate BMI.   BP Readings from Last 1 Encounters:   10/08/19 (!) 176/96      Pulse Readings from Last 1 Encounters:   10/08/19 63      Resp Readings from Last 1 Encounters:   07/18/19 16      Temp Readings from Last 1 Encounters:   08/06/19 98  F (36.7  C) (Oral)      SpO2 Readings from Last 1 Encounters:   07/18/19 94%      Wt Readings from Last 1 Encounters:   10/08/19 78.8 kg (173 lb 12.8 oz)      Ht Readings from Last 1 Encounters:   06/05/19 1.638 m (5' 4.5\")     GENERAL: no distress, I can  see from upper  trunk up.    SKIN: Visible skin clear. No significant rash, abnormal pigmentation or lesions.  EYES: glasses; Eyes grossly normal to inspection.  .  NECK: no visible masses;   RESP: No audible wheeze, cough, or increased work of breathing.    NEURO: Awake, alert, responds appropriately to questions.  Mentation and speech fluent.  PSYCH:affect normal, and appearance well-groomed.    DATA REVIEW     Latest Ref Rng 1/27/2022  1:45 PM 12/26/2022  7:24 AM 11/30/2023  8:00 AM   ENDO THYROID LABS-[facility]       TSH 0.40 - 4.00 mU/L 0.02 (L)      TSH 0.30 - 4.20 uIU/mL  0.02 (L)  0.01 (L)    FREE T4 0.90 - 1.70 ng/dL 1.41  1.81 (H)  2.32 (H)       Legend:  (L) Low  (H) " High    EXAMINATION: US HEAD NECK SOFT TISSUE, 11/30/2023 8:22 AM   COMPARISON: Multiple ultrasounds dated back to 4/10/2019  HISTORY: Papillary thyroid carcinoma  FINDINGS:   Lymph nodes are measured bilaterally with measurements given in transverse x AP x craniocaudal dimensions as follows:  Right:  Level 2: 1.3 x 0.8 x 2.0 cm morphologically benign-appearing lymph node  No suspicious lymphadenopathy identified.  Left:  No suspicious lymphadenopathy identified.                                                              IMPRESSION:  Benign appearing right level 2 lymph node. No suspicious lymphadenopathy.  I have personally reviewed the examination and initial interpretation and I agree with the findings.  LUIS LORA,

## 2023-12-18 NOTE — LETTER
12/18/2023       RE: Price Rene  [address]   [phone]  [place] [id number]     Dear Colleague,    Thank you for referring your patient, Price Rene, to the [facility] [place] [facility]. Please see a copy of my visit note below.    Endocrinology video visit      Attending Assessment/Plan :     Papillary thyroid carcinoma (H)  mulfifocal, bilateral, subcm primary (4 foci, TTD not possible from path report data),17 LN + with bulky size (up to 4.4 cm) and TOR.  He has been treated with total Tx, CND and left LND and adjuvant 131I .   pT1a, pN1b, pMx, cMx, stage 2 minimum  MACIS 5.72 assuming complete resection and no distant mets  HOWARD recurrence risk HIGH due to # of involved LN, size of involved LN, TOR  See me yearly --     Post-surgical hypothyroidismTreat to target TSH < 0.4 due to the thyroid cancer.    He is currently on LT4  150 * 7.5/week, divided.  Reduce to 150 mcg/day.    Labs in March 2024.     Lung uptake on post therapy TBS from 7/23/19.  No corresponding abnormality on SPECT  Radiologist believes this was artifact. I am placing on my list so I don't forget about it.      Immigrant with language barrier.   Medication management issue is apparent today .       Due to the continued risks of COVID 19 transmission and to improve ease of access this visit was a video visit. The patient gave verbal consent for the visit today.    I have independently reviewed and interpreted labs, imaging as indicated.     Distant Location (provider location):  Off-site  Mode of Communication:  Video Conference via [employer].  We had significant problems with audio and video trying to room.  Rooming too a very long time  requiring 2 interpreters, several interactions about technology.   Chart review/prep time 1  [id number]  Visit Start time  1622  Visit Stop time 1654   _49_ minutes spent on the date of  the encounter doing chart review, history and exam, documentation and further activities as noted above.      Yanira Uribe MD      Chief complaint/ HISTORY OF PRESENT ILLNESS  Price presents for follow up of thyroid cancer, post surgical hypothyroidism.  He was seen by me along with  who is on the line.  His daughter is at his side.   I last saw him 1/2023.  At that time he was  on LT4  150 * 7.5/week, divided.  Today he initially said he continues on this .  Later he said he ran out about 1.5 months ago.  Later they got the pill bottles and read them to me, confirming he had the levothyroxine at the prescribed dose.   He already had labs and US in anticipation of this appt.      Left neck mass FNAB was read as positive for papillary thyroid carcinoma.  Imaging did not clearly show  a thyroid primary .   Thyroid cancer  treatment has been as follows:   4/25/19 total thyroidectomy, left central and modified radical neck dissection levels 2-4.  Right upper parathyroid autotx into left SCM (Dr Yunier Rodriguez) removing  papillary thyroid carcinoma, bilateral, largest 0.4 cm left (total tumor diameter not possible from path report), + LN 17/55 (12/26 CND, largest tumor focus 0.4 cm) with + TOR, largest tumor met 4.4 cm left level 2A, but tumor also in level 3 and 4 LNs  7/18/19 2 dose thyrogen stimulated 153 mCi 131I.        We have the following labs  4/8/19: Tg 8.1, HOWARD < 0.4,  TSH 1.2  4/25/19 Ca 8.4, PTH 28  4/26/19 Ca 7.8, creatinine 0.71  5/8/19 TSH 2.88, Ca 8.9, phos 3.3, creatinine 0.65, urine iodine 261 mg/g creatinine  6/5/19 g < 0.1, HOWARD < 0.4 -   TSH 5.5, free T4 1.03, Ca 8.7, phos 2.7, creatinine 0.72; on LT4 150 mcg/day  6/23/19 we increased LT4 to 150 * 7.5/week  7/18/19 Tg 0.74, Howard < 0.4,  thyrogen stimulated  10/8/19: Tg < 0.5, HOWARD 0.7, TSH 0.71, free T4 1.47, Ca 8.6  1/9/2020 TSH < 0.01, free T4 1.64  10/21/2020 Tg < 0.5, HOWARD < 0.4, TSH 0.03, free T4 1.55  5/24/2021  Tg < 0.1, HOWARD < 0.4 , TSH < 0.01, free T4 1.52  1/27/2022  Tg < 0.1, HOWARD < 0.4, TSH 0.02, free T4 1.41  12/26/22 Tg < 0.1, HOWARD < 0.4, TSH 0.02, free T4 1.81  11/30/23 Tg < 0.1, HOWARD < 0.4 , HOWARD , TSH 0.01, free T4 2.32    I have reviewed images on PACS  3/14/19 CXR negative  4/10/19 CT neck with contrast:   Confirms the US   7/23/19 131I post therapy TBS neck uptake ; question of bilateral lung uptake - but there was no corresponding abnormality on SPECT   1130/23 neck US compared with 1/31/2022 , 11/4/2020 neck US:   Right level 3 # 1 not seen was  0.7 x 0.2 x 1.0   Right level 3 # 2 not seen was 0.6 x 0.3 x 1.1       REVIEW OF SYSTEMS  Feels fine  No problems  Energy OK  Sleep OK  Getting vitals at [facility] - where his PCP is  Cardiac: negative;   Respiratory: negative  GI: negative     Past Medical History  Past Medical History:   Diagnosis Date    Helicobacter pylori antibody positive     HTN (hypertension)     Hyperlipidemia     Papillary thyroid carcinoma (H) 03/25/2019    Post-surgical hypothyroidism 04/25/2019     Past Surgical History:   Procedure Laterality Date    DISSECTION RADICAL NECK MODIFIED N/A 4/25/2019    Procedure: Left Modified Radical Neck Dissection;  Surgeon: Yunier Rodriguez MD;  Location: [facility]    THYROIDECTOMY N/A 4/25/2019    Procedure: Total Thyroidectomy, Central Neck Dissection;  Surgeon: Yunier Rodriguez MD;  Location: [facility]     Medications    Current Outpatient Medications   Medication Sig Dispense Refill    atorvastatin (LIPITOR) 40 MG tablet Take 40 mg by mouth daily      hydrochlorothiazide (HYDRODIURIL) 25 MG tablet Take 1 tablet by mouth daily      levothyroxine (SYNTHROID/LEVOTHROID) 150 MCG tablet Take 1 tablet (150 mcg) by mouth daily 180 tablet 1    telmisartan (MICARDIS) 40 MG tablet Take 40 mg by mouth daily      acetaminophen (TYLENOL) 325 MG tablet Take 325-650 mg by mouth every 6 hours as needed for mild pain (Patient not taking: Reported on 1/4/2023)    "   losartan (COZAAR) 50 MG tablet Take 1 tablet (50 mg) by mouth daily 90 tablet 3    mineral oil-hydrophilic petrolatum (AQUAPHOR) external ointment Apply topically every 8 hours Apply to neck incision three times daily (Patient not taking: Reported on 1/4/2023) 50 g 1     Doesn't know what his med is for      Allergies  Allergies   Allergen Reactions    Penicillins Hives     Family History  Family History   Problem Relation Age of Onset    Hypertension Mother     Diabetes Brother     Hypertension Brother     Thyroid Disease No family hx of     Thyroid Cancer No family hx of     Cancer No family hx of      Social History  Social History     Tobacco Use    Smoking status: Former    Smokeless tobacco: Never   Substance Use Topics    Alcohol use: Yes     Alcohol/week: 4.0 standard drinks of alcohol     Types: 4 Cans of beer per week     Comment: once in awhile    Drug use: No     PCP is at [facility] - last seen 8/18/23     Physical Exam  There were no vitals taken for this visit.  There is no height or weight on file to calculate BMI.   BP Readings from Last 1 Encounters:   10/08/19 (!) 176/96      Pulse Readings from Last 1 Encounters:   10/08/19 63      Resp Readings from Last 1 Encounters:   07/18/19 16      Temp Readings from Last 1 Encounters:   08/06/19 98  F (36.7  C) (Oral)      SpO2 Readings from Last 1 Encounters:   07/18/19 94%      Wt Readings from Last 1 Encounters:   10/08/19 78.8 kg (173 lb 12.8 oz)      Ht Readings from Last 1 Encounters:   06/05/19 1.638 m (5' 4.5\")     GENERAL: no distress, I can  see from upper  trunk up.    SKIN: Visible skin clear. No significant rash, abnormal pigmentation or lesions.  EYES: glasses; Eyes grossly normal to inspection.  .  NECK: no visible masses;   RESP: No audible wheeze, cough, or increased work of breathing.    NEURO: Awake, alert, responds appropriately to questions.  Mentation and speech fluent.  PSYCH:affect normal, and appearance " well-groomed.    DATA REVIEW     Latest Ref Rng 1/27/2022  1:45 PM 12/26/2022  7:24 AM 11/30/2023  8:00 AM   ENDO THYROID LABS-[facility]       TSH 0.40 - 4.00 mU/L 0.02 (L)      TSH 0.30 - 4.20 uIU/mL  0.02 (L)  0.01 (L)    FREE T4 0.90 - 1.70 ng/dL 1.41  1.81 (H)  2.32 (H)       Legend:  (L) Low  (H) High    EXAMINATION: US HEAD NECK SOFT TISSUE, 11/30/2023 8:22 AM   COMPARISON: Multiple ultrasounds dated back to 4/10/2019  HISTORY: Papillary thyroid carcinoma  FINDINGS:   Lymph nodes are measured bilaterally with measurements given in transverse x AP x craniocaudal dimensions as follows:  Right:  Level 2: 1.3 x 0.8 x 2.0 cm morphologically benign-appearing lymph node  No suspicious lymphadenopathy identified.  Left:  No suspicious lymphadenopathy identified.                                                              IMPRESSION:  Benign appearing right level 2 lymph node. No suspicious lymphadenopathy.  I have personally reviewed the examination and initial interpretation and I agree with the findings.  LUIS LORA DO

## 2023-12-19 ENCOUNTER — TELEPHONE (OUTPATIENT)
Dept: ENDOCRINOLOGY | Facility: CLINIC | Age: 73
End: 2023-12-19
Payer: MEDICARE

## 2023-12-19 NOTE — TELEPHONE ENCOUNTER
Spoke with pt and daughter to schedule appts for labs in march and 2-3 weeks prior to next appt Lauro Singh on 12/19/2023 at 1:07 PM

## 2024-03-11 ENCOUNTER — LAB (OUTPATIENT)
Dept: LAB | Facility: CLINIC | Age: 74
End: 2024-03-11
Payer: MEDICARE

## 2024-03-11 DIAGNOSIS — C73 PAPILLARY THYROID CARCINOMA (H): ICD-10-CM

## 2024-03-11 DIAGNOSIS — E89.0 POST-SURGICAL HYPOTHYROIDISM: ICD-10-CM

## 2024-03-11 LAB
T4 FREE SERPL-MCNC: 1.82 NG/DL (ref 0.9–1.7)
TSH SERPL DL<=0.005 MIU/L-ACNC: 0.01 UIU/ML (ref 0.3–4.2)

## 2024-03-11 PROCEDURE — 84443 ASSAY THYROID STIM HORMONE: CPT | Performed by: PATHOLOGY

## 2024-03-11 PROCEDURE — 84439 ASSAY OF FREE THYROXINE: CPT | Performed by: PATHOLOGY

## 2024-03-11 PROCEDURE — 36415 COLL VENOUS BLD VENIPUNCTURE: CPT | Performed by: PATHOLOGY

## 2024-03-14 ENCOUNTER — TELEPHONE (OUTPATIENT)
Dept: ENDOCRINOLOGY | Facility: CLINIC | Age: 74
End: 2024-03-14
Payer: MEDICARE

## 2024-03-14 DIAGNOSIS — C73 PAPILLARY THYROID CARCINOMA (H): ICD-10-CM

## 2024-03-14 DIAGNOSIS — E89.0 POST-SURGICAL HYPOTHYROIDISM: ICD-10-CM

## 2024-03-14 RX ORDER — LEVOTHYROXINE SODIUM 150 UG/1
150 TABLET ORAL DAILY
Qty: 180 TABLET | Refills: 1 | Status: SHIPPED | OUTPATIENT
Start: 2024-03-14

## 2024-03-14 NOTE — TELEPHONE ENCOUNTER
Sent in new script for urgent fill. He lost his supply in [place]  Sofie Beltran RN on 3/14/2024 at 5:02 PM

## 2024-03-14 NOTE — TELEPHONE ENCOUNTER
[facility] Call Center    Phone Message    May a detailed message be left on voicemail: yes     Reason for Call: Medication Refill Request    Has the patient contacted the pharmacy for the refill? Yes   Name of medication being requested: levothyroxine (SYNTHROID/LEVOTHROID) 150 MCG tablet   Provider who prescribed the medication:   Pharmacy:   [id number] - [facility] [address] [place] [address] AT [facility] [address]     Date medication is needed: ASAP     Per daughter calling to report that pt lost medication when traveling in [place] and needs medication asap. Per daughter pt is back now.  Please and thank you!    Action Taken: Message routed to:  [facility] ENDO    Travel Screening: Not Applicable

## 2024-03-22 ENCOUNTER — TELEPHONE (OUTPATIENT)
Dept: ENDOCRINOLOGY | Facility: CLINIC | Age: 74
End: 2024-03-22
Payer: MEDICARE

## 2024-03-22 NOTE — TELEPHONE ENCOUNTER
[facility]    Phone Message    May a detailed message be left on voicemail: yes     Reason for Call: Requesting Results     Name/type of test: Lab results   Date of test: 3/11/2024  Was test done at a location other than [facility] (Please fill in the location if not [facility])?: No  (Please call daughter to discuss)  Action Taken: Message routed to:  [facility] Endo    Travel Screening: Not Applicable

## 2024-03-22 NOTE — TELEPHONE ENCOUNTER
Please read him the results letter from 3/11/24.   We didn't need thyroglobulin with this draw, we got it in November .   Thanks  Yanira Uribe MD

## 2024-03-22 NOTE — TELEPHONE ENCOUNTER
Labs done 3/11 Thyroid function with possible missed Thyroglobulin . Requesting results Sofie Beltran RN on 3/22/2024 at 2:53 PM

## 2024-03-25 NOTE — TELEPHONE ENCOUNTER
Contacted patient with  and results given with no changes in levothyroxine dosing. Sofie Beltran RN on 3/25/2024 at 10:32 AM

## 2024-12-02 ENCOUNTER — TELEPHONE (OUTPATIENT)
Dept: ENDOCRINOLOGY | Facility: CLINIC | Age: 74
End: 2024-12-02

## 2024-12-02 NOTE — TELEPHONE ENCOUNTER
[facility] Call Center    Phone Message    May a detailed message be left on voicemail: yes     Reason for Call: Other: The patient daughter was calling to ask if the care team could call her to go over medications he might need to 3 Month th supply but tg don't know the name of his medication please review and follow up thank you.        Action Taken: Message routed to:  [facility] Endo    Travel Screening: Not Applicable     Date of Service:

## 2024-12-03 ENCOUNTER — APPOINTMENT (OUTPATIENT)
Dept: INTERPRETER SERVICES | Facility: CLINIC | Age: 74
End: 2024-12-03
Payer: MEDICARE

## 2024-12-03 NOTE — TELEPHONE ENCOUNTER
Please note his Rx is already for a 6 month supply.  Is there a problem with getting it filled as written?    Yanira Uribe MD

## 2024-12-03 NOTE — TELEPHONE ENCOUNTER
Pt is currently taking 150mcg Levothyroxine. Pt needs 3m supply as he is traveling to [place] for three months. Pt leaves 12/14. Needs refills asap if possible.     Advised pt he would not be able to attend his visit if he was in [place] at the time of his 12/16 visit. Pt agreed to change his appointment to after his return in April.

## 2024-12-03 NOTE — TELEPHONE ENCOUNTER
Called pt back. LVM letting them know there is still a 6m refill pending at the pharmacy for them to fill from march.

## 2024-12-12 ENCOUNTER — LAB (OUTPATIENT)
Dept: LAB | Facility: CLINIC | Age: 74
End: 2024-12-12
Payer: MEDICARE

## 2024-12-12 DIAGNOSIS — E89.0 POST-SURGICAL HYPOTHYROIDISM: ICD-10-CM

## 2024-12-12 DIAGNOSIS — C73 PAPILLARY THYROID CARCINOMA (H): ICD-10-CM

## 2024-12-12 LAB
T4 FREE SERPL-MCNC: 2.39 NG/DL (ref 0.9–1.7)
TSH SERPL DL<=0.005 MIU/L-ACNC: 0.07 UIU/ML (ref 0.3–4.2)

## 2024-12-12 PROCEDURE — 86800 THYROGLOBULIN ANTIBODY: CPT

## 2024-12-12 PROCEDURE — 36415 COLL VENOUS BLD VENIPUNCTURE: CPT | Performed by: PATHOLOGY

## 2024-12-12 PROCEDURE — 84443 ASSAY THYROID STIM HORMONE: CPT | Performed by: PATHOLOGY

## 2024-12-12 PROCEDURE — 84439 ASSAY OF FREE THYROXINE: CPT | Performed by: PATHOLOGY

## 2025-04-22 ENCOUNTER — VIRTUAL VISIT (OUTPATIENT)
Dept: ENDOCRINOLOGY | Facility: CLINIC | Age: 75
End: 2025-04-22
Payer: MEDICARE

## 2025-04-22 VITALS — WEIGHT: 163 LBS | BODY MASS INDEX: 27.16 KG/M2 | HEIGHT: 65 IN

## 2025-04-22 DIAGNOSIS — E89.0 POST-SURGICAL HYPOTHYROIDISM: Primary | ICD-10-CM

## 2025-04-22 DIAGNOSIS — C73 PAPILLARY THYROID CARCINOMA (H): ICD-10-CM

## 2025-04-22 PROCEDURE — G2211 COMPLEX E/M VISIT ADD ON: HCPCS

## 2025-04-22 PROCEDURE — 98006 SYNCH AUDIO-VIDEO EST MOD 30: CPT

## 2025-04-22 PROCEDURE — 1126F AMNT PAIN NOTED NONE PRSNT: CPT | Mod: 95

## 2025-04-22 RX ORDER — LEVOTHYROXINE SODIUM 150 UG/1
TABLET ORAL
Qty: 180 TABLET | Refills: 2 | Status: SHIPPED | OUTPATIENT
Start: 2025-04-22

## 2025-04-22 ASSESSMENT — PAIN SCALES - GENERAL: PAINLEVEL_OUTOF10: NO PAIN (0)

## 2025-04-22 ASSESSMENT — PATIENT HEALTH QUESTIONNAIRE - PHQ9: SUM OF ALL RESPONSES TO PHQ QUESTIONS 1-9: 0

## 2025-04-22 NOTE — PROGRESS NOTES
Virtual Visit Details    Type of service:  Video Visit     Originating Location (pt. Location): Home    Endocrinology video visit      Attending Assessment/Plan :     Papillary thyroid carcinoma (H) mulfifocal, bilateral, subcm primary (4 foci, TTD not possible from path report data),17 LN + with bulky size (up to 4.4 cm) and TOR.  Treatment has been total Tx, CND and left LND and adjuvant 131I .  We are now 6 years post diagnosis with NEFTALY  pT1a, pN1b, pMx, cMx, stage 2 minimum  MACIS 5.72 assuming complete resection and no distant mets  HOWARD recurrence risk HIGH due to # of involved LN, size of involved LN, TOR  Yearly labs including Tg, TSh, free T4   Neck US 2027 prior to appt     Post-surgical hypothyroidismTreat to target TSH < 0.4 due to the thyroid cancer.    He is currently on LT4  150 mcg/day.   Reduce to 150 x 6.5/week and repeat labs in 3 months   Rx 6 month supply    Lung uptake on post therapy TBS from 7/23/19.  No corresponding abnormality on SPECT  Radiologist believes this was artifact. I am placing on my list so I don't forget about it.      Immigrant with language barrier.          The Longitudinal plan of care for postsurgical hypothyroidism related to thyroid cancer/thyroid cancer surveillance/treatment was addressed during this visit. Due to added complexity of care, we will continue to support Price , and the subsequent management of this condition(s) and with the ongoing continuity of care of this condition.    Due to the continued risks of COVID 19 transmission and to improve ease of access this visit was a video visit.   Patient did not have access to video, while the distant provider did.  -Patient did not consent to video.  -Video connection was lost, and majority of the visit was completed via audio-only.  The patient gave verbal consent for the visit today.    I have independently reviewed and interpreted labs, imaging as indicated.     Distant Location (provider location):   Off-site  Mode of Communication:  Video Conference via [email or web address].  We had poor audio quality . We signed out and back in 3 times.    Chart review/prep time 1  [id number]; 1220  Visit Start time  1651  Visit Stop time  1705   _30_ I spent minutes spent on the date of the encounter doing chart review, history and exam, documentation and further activities as noted above, exclusive of CGM reading time .    Yanira Uribe MD      Chief complaint/ HISTORY OF PRESENT ILLNESS  Price presents, along with his English speaking daughter,  for follow up of thyroid cancer, post surgical hypothyroidism.  I last saw him 12/2023.  At that time he was  on LT4  150 * 7.5/week, divided and he is now on LT4 150 mcg/day since the last appt   Labs in 12/24 showed undetectable Tg and continued TSH below our target.     Left neck mass FNAB was read as positive for papillary thyroid carcinoma.  Thyroid cancer  treatment has been as follows:   4/25/19 total thyroidectomy, left central and modified radical neck dissection levels 2-4.  Right upper parathyroid autotx into left SCM (Dr Yunier Rodriguez) removing  papillary thyroid carcinoma, bilateral, largest 0.4 cm left (total tumor diameter not possible from path report), + LN 17/55 (12/26 CND, largest tumor focus 0.4 cm) with + TOR, largest tumor met 4.4 cm left level 2A, but tumor also in level 3 and 4 LNs  7/18/19 2 dose thyrogen stimulated 153 mCi 131I.        We have the following labs  4/8/19: Tg 8.1, HOWARD < 0.4,  TSH 1.2  4/25/19 Ca 8.4, PTH 28  6/5/19 g < 0.1, HOWARD < 0.4 -   TSH 5.5, free T4 1.03, Ca 8.7, phos 2.7, creatinine 0.72; on LT4 150 mcg/day  6/23/19 we increased LT4 to 150 * 7.5/week  7/18/19 Tg 0.74, Howard < 0.4,  thyrogen stimulated  10/8/19: Tg < 0.5, HOWARD 0.7, TSH 0.71, free T4 1.47, Ca 8.6  1/9/2020 TSH < 0.01, free T4 1.64  10/21/2020 Tg < 0.5, HOWARD < 0.4, TSH 0.03, free T4 1.55  5/24/2021 Tg < 0.1, HOWARD < 0.4 , TSH < 0.01, free T4 1.52  1/27/2022  Tg < 0.1, HOWARD  < 0.4, TSH 0.02, free T4 1.41  12/26/22 Tg < 0.1, HOWARD < 0.4, TSH 0.02, free T4 1.81  11/30/23 Tg < 0.1, HOWARD < 0.4 , HOWARD , TSH 0.01, free T4 2.32  3/11/24 TSH 0.01, free T4 1.82.    12/12/24 Tg < 0.1, HOWARD < 0.4, TSH 0.07, free T4 2.39    I have reviewed images on PACS  3/14/19 CXR negative  4/10/19 CT neck with contrast:   Confirms the US   7/23/19 131I post therapy TBS neck uptake ; question of bilateral lung uptake - but there was no corresponding abnormality on SPECT   1130/23 neck US compared with 1/31/2022 , 11/4/2020 neck US:   Right level 3 # 1 not seen was  0.7 x 0.2 x 1.0   Right level 3 # 2 not seen was 0.6 x 0.3 x 1.1       REVIEW OF SYSTEMS  Burning lips / numb -  x 2 weeks.  Intermittent .   He had it 50 years ago when he used to smoke.    Sleep OK  Cardiac: negative    Past Medical History  Past Medical History:   Diagnosis Date    Helicobacter pylori antibody positive     HTN (hypertension)     Hyperlipidemia     Papillary thyroid carcinoma (H) 03/25/2019    Post-surgical hypothyroidism 04/25/2019     Past Surgical History:   Procedure Laterality Date    DISSECTION RADICAL NECK MODIFIED N/A 4/25/2019    Procedure: Left Modified Radical Neck Dissection;  Surgeon: Yunier Rodriguez MD;  Location: [facility]    THYROIDECTOMY N/A 4/25/2019    Procedure: Total Thyroidectomy, Central Neck Dissection;  Surgeon: Yunier Rodriguez MD;  Location: [facility] OR     Medications    Current Outpatient Medications   Medication Sig Dispense Refill    acetaminophen (TYLENOL) 325 MG tablet Take 325-650 mg by mouth every 6 hours as needed for mild pain (Patient not taking: Reported on 1/4/2023)      atorvastatin (LIPITOR) 40 MG tablet Take 40 mg by mouth daily      hydrochlorothiazide (HYDRODIURIL) 25 MG tablet Take 1 tablet by mouth daily      levothyroxine (SYNTHROID/LEVOTHROID) 150 MCG tablet Take 1 tablet (150 mcg) by mouth daily 180 tablet 1    losartan (COZAAR) 50 MG tablet Take 1 tablet (50 mg) by mouth daily 90 tablet 3  "   mineral oil-hydrophilic petrolatum (AQUAPHOR) external ointment Apply topically every 8 hours Apply to neck incision three times daily (Patient not taking: Reported on 1/4/2023) 50 g 1    telmisartan (MICARDIS) 40 MG tablet Take 40 mg by mouth daily       He is not taking atorvastatin   They changed pharmacy to [facility]     Allergies  Allergies   Allergen Reactions    Penicillins Hives     Family History  Family History   Problem Relation Age of Onset    Hypertension Mother     Diabetes Brother     Hypertension Brother     Thyroid Disease No family hx of     Thyroid Cancer No family hx of     Cancer No family hx of      Social History  Social History     Tobacco Use    Smoking status: Former    Smokeless tobacco: Never   Substance Use Topics    Alcohol use: Yes     Alcohol/week: 4.0 standard drinks of alcohol     Types: 4 Cans of beer per week     Comment: once in awhile    Drug use: No     PCP is at [facility]     Physical Exam  There were no vitals taken for this visit.  There is no height or weight on file to calculate BMI.   BP Readings from Last 1 Encounters:   10/08/19 (!) 176/96      Pulse Readings from Last 1 Encounters:   10/08/19 63      Resp Readings from Last 1 Encounters:   07/18/19 16      Temp Readings from Last 1 Encounters:   08/06/19 98  F (36.7  C) (Oral)      SpO2 Readings from Last 1 Encounters:   07/18/19 94%      Wt Readings from Last 1 Encounters:   10/08/19 78.8 kg (173 lb 12.8 oz)      Ht Readings from Last 1 Encounters:   06/05/19 1.638 m (5' 4.5\")     GENERAL: they moved outside which helped the audio .   no distress, I can  see from mid  trunk up.   He appears well, intermittently smiling;   SKIN: Visible skin clear.   EYES: glasses;  NECK: no visible masses;   RESP: No audible wheeze, cough, or increased work of breathing.    NEURO: Awake, alert, responds appropriately to questions.  Mentation and speech fluent.  PSYCH:affect normal, and appearance " well-groomed.    DATA REVIEW        Latest Ref Rng 11/30/2023  8:00 AM 3/11/2024  1:15 PM 12/12/2024  4:39 PM   ENDO THYROID LABS-[facility]       TSH 0.30 - 4.20 uIU/mL 0.01 (L)  0.01 (L)  0.07 (L)    FREE T4 0.90 - 1.70 ng/dL 2.32 (H)  1.82 (H)  2.39 (H)       Legend:  (L) Low  (H) High

## 2025-04-22 NOTE — LETTER
4/22/2025       RE: Price Rene  [address]   [phone]  [place] [address]     Dear Colleague,    Thank you for referring your patient, Price Rene, to the [facility] [place] [facility]. Please see a copy of my visit note below.    Virtual Visit Details    Type of service:  Video Visit     Originating Location (pt. Location): Home    Endocrinology video visit      Attending Assessment/Plan :     Papillary thyroid carcinoma (H) mulfifocal, bilateral, subcm primary (4 foci, TTD not possible from path report data),17 LN + with bulky size (up to 4.4 cm) and TOR.  Treatment has been total Tx, CND and left LND and adjuvant 131I .  We are now 6 years post diagnosis with NEFTALY  pT1a, pN1b, pMx, cMx, stage 2 minimum  MACIS 5.72 assuming complete resection and no distant mets  HOWARD recurrence risk HIGH due to # of involved LN, size of involved LN, TOR  Yearly labs including Tg, TSh, free T4   Neck US 2027 prior to appt     Post-surgical hypothyroidismTreat to target TSH < 0.4 due to the thyroid cancer.    He is currently on LT4  150 mcg/day.   Reduce to 150 x 6.5/week and repeat labs in 3 months   Rx 6 month supply    Lung uptake on post therapy TBS from 7/23/19.  No corresponding abnormality on SPECT  Radiologist believes this was artifact. I am placing on my list so I don't forget about it.      Immigrant with language barrier.          The Longitudinal plan of care for postsurgical hypothyroidism related to thyroid cancer/thyroid cancer surveillance/treatment was addressed during this visit. Due to added complexity of care, we will continue to support Price , and the subsequent management of this condition(s) and with the ongoing continuity of care of this condition.    Due to the continued risks of COVID 19 transmission and to improve ease of access this visit was a video visit.   Patient did not have  access to video, while the distant provider did.  -Patient did not consent to video.  -Video connection was lost, and majority of the visit was completed via audio-only.  The patient gave verbal consent for the visit today.    I have independently reviewed and interpreted labs, imaging as indicated.     Distant Location (provider location):  Off-site  Mode of Communication:  Video Conference via [employer]  We had poor audio quality . We signed out and back in 3 times.    Chart review/prep time 1  [id number]; 1220  Visit Start time  1651  Visit Stop time  1705   _30_ I spent minutes spent on the date of the encounter doing chart review, history and exam, documentation and further activities as noted above, exclusive of CGM reading time .    Yanira Uribe MD      Chief complaint/ HISTORY OF PRESENT ILLNESS  Price presents, along with his English speaking daughter,  for follow up of thyroid cancer, post surgical hypothyroidism.  I last saw him 12/2023.  At that time he was  on LT4  150 * 7.5/week, divided and he is now on LT4 150 mcg/day since the last appt   Labs in 12/24 showed undetectable Tg and continued TSH below our target.     Left neck mass FNAB was read as positive for papillary thyroid carcinoma.  Thyroid cancer  treatment has been as follows:   4/25/19 total thyroidectomy, left central and modified radical neck dissection levels 2-4.  Right upper parathyroid autotx into left SCM (Dr Yunier Rodriguez) removing  papillary thyroid carcinoma, bilateral, largest 0.4 cm left (total tumor diameter not possible from path report), + LN 17/55 (12/26 CND, largest tumor focus 0.4 cm) with + TOR, largest tumor met 4.4 cm left level 2A, but tumor also in level 3 and 4 LNs  7/18/19 2 dose thyrogen stimulated 153 mCi 131I.        We have the following labs  4/8/19: Tg 8.1, HOWARD < 0.4,  TSH 1.2  4/25/19 Ca 8.4, PTH 28  6/5/19 g < 0.1, HOWARD < 0.4 -   TSH 5.5, free T4 1.03, Ca 8.7, phos 2.7, creatinine 0.72; on LT4 150  mcg/day  6/23/19 we increased LT4 to 150 * 7.5/week  7/18/19 Tg 0.74, Leticia < 0.4,  thyrogen stimulated  10/8/19: Tg < 0.5, LETICIA 0.7, TSH 0.71, free T4 1.47, Ca 8.6  1/9/2020 TSH < 0.01, free T4 1.64  10/21/2020 Tg < 0.5, LETICIA < 0.4, TSH 0.03, free T4 1.55  5/24/2021 Tg < 0.1, LETICIA < 0.4 , TSH < 0.01, free T4 1.52  1/27/2022  Tg < 0.1, LETICIA < 0.4, TSH 0.02, free T4 1.41  12/26/22 Tg < 0.1, LETICIA < 0.4, TSH 0.02, free T4 1.81  11/30/23 Tg < 0.1, LETICIA < 0.4 , LETICIA , TSH 0.01, free T4 2.32  3/11/24 TSH 0.01, free T4 1.82.    12/12/24 Tg < 0.1, LETICIA < 0.4, TSH 0.07, free T4 2.39    I have reviewed images on PACS  3/14/19 CXR negative  4/10/19 CT neck with contrast:   Confirms the US   7/23/19 131I post therapy TBS neck uptake ; question of bilateral lung uptake - but there was no corresponding abnormality on SPECT   1130/23 neck US compared with 1/31/2022 , 11/4/2020 neck US:   Right level 3 # 1 not seen was  0.7 x 0.2 x 1.0   Right level 3 # 2 not seen was 0.6 x 0.3 x 1.1       REVIEW OF SYSTEMS  Burning lips / numb -  x 2 weeks.  Intermittent .   He had it 50 years ago when he used to smoke.    Sleep OK  Cardiac: negative    Past Medical History  Past Medical History:   Diagnosis Date     Helicobacter pylori antibody positive      HTN (hypertension)      Hyperlipidemia      Papillary thyroid carcinoma (H) 03/25/2019     Post-surgical hypothyroidism 04/25/2019     Past Surgical History:   Procedure Laterality Date     DISSECTION RADICAL NECK MODIFIED N/A 4/25/2019    Procedure: Left Modified Radical Neck Dissection;  Surgeon: Yunier Rodriguez MD;  Location: [facility]     THYROIDECTOMY N/A 4/25/2019    Procedure: Total Thyroidectomy, Central Neck Dissection;  Surgeon: Yunier Rodriguez MD;  Location: [facility] OR     Medications    Current Outpatient Medications   Medication Sig Dispense Refill     acetaminophen (TYLENOL) 325 MG tablet Take 325-650 mg by mouth every 6 hours as needed for mild pain (Patient not taking: Reported on  "1/4/2023)       atorvastatin (LIPITOR) 40 MG tablet Take 40 mg by mouth daily       hydrochlorothiazide (HYDRODIURIL) 25 MG tablet Take 1 tablet by mouth daily       levothyroxine (SYNTHROID/LEVOTHROID) 150 MCG tablet Take 1 tablet (150 mcg) by mouth daily 180 tablet 1     losartan (COZAAR) 50 MG tablet Take 1 tablet (50 mg) by mouth daily 90 tablet 3     mineral oil-hydrophilic petrolatum (AQUAPHOR) external ointment Apply topically every 8 hours Apply to neck incision three times daily (Patient not taking: Reported on 1/4/2023) 50 g 1     telmisartan (MICARDIS) 40 MG tablet Take 40 mg by mouth daily       He is not taking atorvastatin   They changed pharmacy to [facility]     Allergies  Allergies   Allergen Reactions     Penicillins Hives     Family History  Family History   Problem Relation Age of Onset     Hypertension Mother      Diabetes Brother      Hypertension Brother      Thyroid Disease No family hx of      Thyroid Cancer No family hx of      Cancer No family hx of      Social History  Social History     Tobacco Use     Smoking status: Former     Smokeless tobacco: Never   Substance Use Topics     Alcohol use: Yes     Alcohol/week: 4.0 standard drinks of alcohol     Types: 4 Cans of beer per week     Comment: once in awhile     Drug use: No     PCP is at [facility]     Physical Exam  There were no vitals taken for this visit.  There is no height or weight on file to calculate BMI.   BP Readings from Last 1 Encounters:   10/08/19 (!) 176/96      Pulse Readings from Last 1 Encounters:   10/08/19 63      Resp Readings from Last 1 Encounters:   07/18/19 16      Temp Readings from Last 1 Encounters:   08/06/19 98  F (36.7  C) (Oral)      SpO2 Readings from Last 1 Encounters:   07/18/19 94%      Wt Readings from Last 1 Encounters:   10/08/19 78.8 kg (173 lb 12.8 oz)      Ht Readings from Last 1 Encounters:   06/05/19 1.638 m (5' 4.5\")     GENERAL: they moved outside which helped the audio .   " no distress, I can  see from mid  trunk up.   He appears well, intermittently smiling;   SKIN: Visible skin clear.   EYES: glasses;  NECK: no visible masses;   RESP: No audible wheeze, cough, or increased work of breathing.    NEURO: Awake, alert, responds appropriately to questions.  Mentation and speech fluent.  PSYCH:affect normal, and appearance well-groomed.    DATA REVIEW        Latest Ref Rng 11/30/2023  8:00 AM 3/11/2024  1:15 PM 12/12/2024  4:39 PM   ENDO THYROID LABS-[facility]       TSH 0.30 - 4.20 uIU/mL 0.01 (L)  0.01 (L)  0.07 (L)    FREE T4 0.90 - 1.70 ng/dL 2.32 (H)  1.82 (H)  2.39 (H)       Legend:  (L) Low  (H) High         Again, thank you for allowing me to participate in the care of your patient.      Sincerely,    Yanira Uribe MD

## 2025-04-22 NOTE — NURSING NOTE
Is the patient currently in the state of [place]? YES    Visit mode: VIDEO    If the visit is dropped, the patient can be reconnected by:VIDEO VISIT: Send to e-mail at: [email or web address]    Will anyone else be joining the visit? NO  (If patient encounters technical issues they should call [phone]    Are changes needed to the allergy or medication list? No    Are refills needed on medications prescribed by this physician? NO    Rooming Documentation:  Questionnaire(s) completed    Reason for visit: Video Visit (Follow Up )    Maya CISNEROS

## 2025-04-22 NOTE — PATIENT INSTRUCTIONS
I recommend you reduce the levothyroxine by 1/2 tablet per week, using the 150 mcg tablets as follows:  MON to SAT 1 tablet/day;  SUN 0.5 tablet    You should have labs to follow up on the change in about 3 months.      Labs around March or April 2026    Next labs about 3 weeks prior to your next yearly visit with around April 2027     Next neck ultrasound 2027 shortly before the 2027 appt     Make sure you continue to see your primary care doctor for the blood pressure  and other primary care

## 2025-05-24 DIAGNOSIS — C73 PAPILLARY THYROID CARCINOMA (H): ICD-10-CM

## 2025-05-24 DIAGNOSIS — E89.0 POST-SURGICAL HYPOTHYROIDISM: ICD-10-CM

## 2025-05-28 RX ORDER — LEVOTHYROXINE SODIUM 150 UG/1
TABLET ORAL
Qty: 180 TABLET | Refills: 2 | OUTPATIENT
Start: 2025-05-28

## 2025-05-28 NOTE — TELEPHONE ENCOUNTER
Last Written Prescription:   Disp Refills Start End ALDAIR   levothyroxine (SYNTHROID/LEVOTHROID) 150 MCG tablet 180 tablet 2 4/22/2025 -- No   Sig: MON to SAT 1 tablet/day; SUN 0.5 tablet\     [facility] [id number] [facility] - [place], [place] - [address]     ----------------------  Refills on file at alternate pharmacy

## 2025-06-25 ENCOUNTER — APPOINTMENT (OUTPATIENT)
Dept: INTERPRETER SERVICES | Facility: CLINIC | Age: 75
End: 2025-06-25
Payer: MEDICARE

## 2025-08-07 ENCOUNTER — LAB (OUTPATIENT)
Dept: LAB | Facility: CLINIC | Age: 75
End: 2025-08-07
Payer: MEDICARE

## 2025-08-07 DIAGNOSIS — C73 PAPILLARY THYROID CARCINOMA (H): ICD-10-CM

## 2025-08-07 DIAGNOSIS — E89.0 POST-SURGICAL HYPOTHYROIDISM: ICD-10-CM

## 2025-08-07 LAB
T4 FREE SERPL-MCNC: 1.7 NG/DL (ref 0.9–1.7)
TSH SERPL DL<=0.005 MIU/L-ACNC: 0.13 UIU/ML (ref 0.3–4.2)

## 2025-08-08 ENCOUNTER — TELEPHONE (OUTPATIENT)
Dept: ENDOCRINOLOGY | Facility: CLINIC | Age: 75
End: 2025-08-08
Payer: MEDICARE

## (undated) DEVICE — RETR ELASTIC STAYS LONE STAR BLUNT DUAL LEAD 3550-1G

## (undated) DEVICE — LINEN TOWEL PACK X5 5464

## (undated) DEVICE — CATH TRAY FOLEY SURESTEP 16FR W/TMP PRB STLK LATEX A319416AM

## (undated) DEVICE — SU ETHILON 3-0 PS-1 18" 1663H

## (undated) DEVICE — LINEN TOWEL PACK X6 WHITE 5487

## (undated) DEVICE — SU SILK 4-0 TIE 12X30" A303H

## (undated) DEVICE — LABEL MEDICATION SYSTEM 3303-P

## (undated) DEVICE — GLOVE PROTEXIS MICRO 7.5  2D73PM75

## (undated) DEVICE — TUBE NASOGASTRIC ENTRIFLEX 12FR 43"

## (undated) DEVICE — SU SILK 0 TIE 6X30" A306H

## (undated) DEVICE — SU ETHILON 4-0 P-3 18" BLACK 699G

## (undated) DEVICE — SPONGE RAY-TEC 4X8" 7318

## (undated) DEVICE — STRAP UNIVERSAL POSITIONING 2-PIECE 4X47X76" 91-287

## (undated) DEVICE — TUBE ENDOTRACHEAL NIM TRIVANTAGE 7.0MM 8229707

## (undated) DEVICE — ADHESIVE SWIFTSET 0.8ML OCTYL SS6

## (undated) DEVICE — SU VICRYL 3-0 SH 8X18" UND J864D

## (undated) DEVICE — GLOVE PROTEXIS MICRO 7.0  2D73PM70

## (undated) DEVICE — ESU ELEC BLADE 2.75" COATED/INSULATED E1455

## (undated) DEVICE — PREP SKIN SCRUB TRAY 4461A

## (undated) DEVICE — Device

## (undated) DEVICE — ESU CORD BIPOLAR AND IRR TUBING AESCULAP US355

## (undated) DEVICE — SU SILK 2-0 TIE 12X30" A305H

## (undated) DEVICE — SUCTION SLEEVE NEPTUNE 2 125MM 0703-005-125

## (undated) DEVICE — NIM ELEC SUBDERMAL NDL 3PAIR/BOX

## (undated) DEVICE — CLIP HORIZON MED BLUE 002200

## (undated) DEVICE — DECANTER VIAL 2006S

## (undated) DEVICE — ESU GROUND PAD ADULT W/CORD E7507

## (undated) DEVICE — SU ETHILON 4-0 PC-3 18" 1864G

## (undated) DEVICE — BLADE KNIFE SURG 15 371115

## (undated) DEVICE — PACK NEURO MINOR UMMC SNE32MNMU4

## (undated) DEVICE — BLADE KNIFE SURG 10 371110

## (undated) DEVICE — PACK GOWN 3/PK DISP XL SBA32GPFCB

## (undated) DEVICE — DRSG TELFA 3X8" 1238

## (undated) DEVICE — PREP POVIDONE IODINE SCRUB 7.5% 4OZ APL82212

## (undated) DEVICE — SU SILK 2-0 SH CR 5X18" C0125

## (undated) DEVICE — SU SILK 0 TIE 6X18" A186H

## (undated) DEVICE — SURGICEL HEMOSTAT 2X3" 1953

## (undated) DEVICE — SU SILK 3-0 TIE 12X30" A304H

## (undated) DEVICE — NIM PROBE PRASS INCREMENTING TIP 8225825

## (undated) DEVICE — SOL NACL 0.9% IRRIG 1000ML BOTTLE 2F7124

## (undated) DEVICE — DRAIN JACKSON PRATT 10MM FLAT 4/4 PERF SU130-1311

## (undated) DEVICE — WIPES FOLEY CARE SURESTEP PROVON DFC100

## (undated) DEVICE — PREP POVIDONE IODINE SOLUTION 10% 4OZ

## (undated) DEVICE — SU MONOCRYL 4-0 P-3 18" UND Y494G

## (undated) DEVICE — SPONGE LAP 18X18" X8435

## (undated) DEVICE — DRAIN JACKSON PRATT RESERVOIR 400ML SU130-1000

## (undated) DEVICE — STIMULATOR NERVE NEURO-PULSE SURGICAL LOCATOR 30968-220

## (undated) DEVICE — SYR 01ML 27GA 0.5" NDL TBC 309623

## (undated) DEVICE — ESU PENCIL SMOKE EVAC W/ROCKER SWITCH 0703-047-000

## (undated) DEVICE — LINEN TOWEL PACK X30 5481

## (undated) DEVICE — SUCTION MANIFOLD DORNOCH ULTRA CART UL-CL500

## (undated) DEVICE — CATH TRAY FOLEY SURESTEP 16FR W/URNE MTR STLK LATEX A303316A

## (undated) DEVICE — SPONGE KITTNER 30-101

## (undated) DEVICE — CLIP HORIZON SM RED WIDE SLOT 001201

## (undated) RX ORDER — ONDANSETRON 2 MG/ML
INJECTION INTRAMUSCULAR; INTRAVENOUS
Status: DISPENSED
Start: 2019-04-25

## (undated) RX ORDER — CLINDAMYCIN PHOSPHATE 600 MG/50ML
INJECTION, SOLUTION INTRAVENOUS
Status: DISPENSED
Start: 2019-04-25

## (undated) RX ORDER — FENTANYL CITRATE 50 UG/ML
INJECTION, SOLUTION INTRAMUSCULAR; INTRAVENOUS
Status: DISPENSED
Start: 2019-04-25

## (undated) RX ORDER — HYDROMORPHONE HYDROCHLORIDE 1 MG/ML
INJECTION, SOLUTION INTRAMUSCULAR; INTRAVENOUS; SUBCUTANEOUS
Status: DISPENSED
Start: 2019-04-25

## (undated) RX ORDER — LIDOCAINE HYDROCHLORIDE 10 MG/ML
INJECTION, SOLUTION EPIDURAL; INFILTRATION; INTRACAUDAL; PERINEURAL
Status: DISPENSED
Start: 2019-03-20

## (undated) RX ORDER — BACITRACIN 500 [USP'U]/G
OINTMENT OPHTHALMIC
Status: DISPENSED
Start: 2019-04-25